# Patient Record
Sex: MALE | Race: AMERICAN INDIAN OR ALASKA NATIVE
[De-identification: names, ages, dates, MRNs, and addresses within clinical notes are randomized per-mention and may not be internally consistent; named-entity substitution may affect disease eponyms.]

---

## 2017-06-08 ENCOUNTER — HOSPITAL ENCOUNTER (OUTPATIENT)
Dept: HOSPITAL 5 - CT | Age: 37
Discharge: HOME | End: 2017-06-08
Attending: RADIOLOGY
Payer: MEDICARE

## 2017-06-08 DIAGNOSIS — I70.221: Primary | ICD-10-CM

## 2017-06-08 LAB — BUN SERPL-MCNC: 16 MG/DL (ref 9–20)

## 2017-06-08 PROCEDURE — 84520 ASSAY OF UREA NITROGEN: CPT

## 2017-06-08 PROCEDURE — 82565 ASSAY OF CREATININE: CPT

## 2017-06-08 PROCEDURE — 36415 COLL VENOUS BLD VENIPUNCTURE: CPT

## 2017-06-27 ENCOUNTER — HOSPITAL ENCOUNTER (INPATIENT)
Dept: HOSPITAL 5 - OPU | Age: 37
LOS: 2 days | Discharge: HOME | DRG: 271 | End: 2017-06-29
Attending: RADIOLOGY | Admitting: RADIOLOGY
Payer: MEDICARE

## 2017-06-27 DIAGNOSIS — Z88.8: ICD-10-CM

## 2017-06-27 DIAGNOSIS — I42.9: ICD-10-CM

## 2017-06-27 DIAGNOSIS — I70.229: Primary | ICD-10-CM

## 2017-06-27 DIAGNOSIS — G47.33: ICD-10-CM

## 2017-06-27 DIAGNOSIS — I25.10: ICD-10-CM

## 2017-06-27 DIAGNOSIS — F17.200: ICD-10-CM

## 2017-06-27 DIAGNOSIS — I50.9: ICD-10-CM

## 2017-06-27 DIAGNOSIS — I11.0: ICD-10-CM

## 2017-06-27 DIAGNOSIS — Z83.3: ICD-10-CM

## 2017-06-27 DIAGNOSIS — I99.8: ICD-10-CM

## 2017-06-27 DIAGNOSIS — I74.3: ICD-10-CM

## 2017-06-27 DIAGNOSIS — Z91.041: ICD-10-CM

## 2017-06-27 DIAGNOSIS — I48.91: ICD-10-CM

## 2017-06-27 DIAGNOSIS — Z91.19: ICD-10-CM

## 2017-06-27 DIAGNOSIS — E87.5: ICD-10-CM

## 2017-06-27 DIAGNOSIS — Z95.5: ICD-10-CM

## 2017-06-27 DIAGNOSIS — E66.9: ICD-10-CM

## 2017-06-27 LAB
ANION GAP SERPL CALC-SCNC: 17 MMOL/L
APTT BLD: 171.4 SEC. (ref 24.2–36.6)
APTT BLD: 26.7 SEC. (ref 24.2–36.6)
BASOPHILS NFR BLD AUTO: 0.4 % (ref 0–1.8)
BUN SERPL-MCNC: 21 MG/DL (ref 9–20)
BUN SERPL-MCNC: 23 MG/DL (ref 9–20)
BUN/CREAT SERPL: 21 %
CALCIUM SERPL-MCNC: 9.3 MG/DL (ref 8.4–10.2)
CHLORIDE SERPL-SCNC: 100.6 MMOL/L (ref 98–107)
CO2 SERPL-SCNC: 22 MMOL/L (ref 22–30)
EOSINOPHIL NFR BLD AUTO: 0 % (ref 0–4.3)
FIBRINOGEN PPP-MCNC: 373 MG/DL (ref 211–480)
GLUCOSE SERPL-MCNC: 259 MG/DL (ref 75–100)
HCT VFR BLD CALC: 51.9 % (ref 35.5–45.6)
HGB BLD-MCNC: 16.8 GM/DL (ref 11.8–15.2)
INR PPP: 1.01 (ref 0.87–1.13)
INR PPP: 1.18 (ref 0.87–1.13)
MCH RBC QN AUTO: 30 PG (ref 28–32)
MCHC RBC AUTO-ENTMCNC: 32 % (ref 32–34)
MCV RBC AUTO: 94 FL (ref 84–94)
PLATELET # BLD: 199 K/MM3 (ref 140–440)
POTASSIUM SERPL-SCNC: 5.2 MMOL/L (ref 3.6–5)
RBC # BLD AUTO: 5.55 M/MM3 (ref 3.65–5.03)
SODIUM SERPL-SCNC: 134 MMOL/L (ref 137–145)
WBC # BLD AUTO: 20.1 K/MM3 (ref 4.5–11)

## 2017-06-27 PROCEDURE — C1894 INTRO/SHEATH, NON-LASER: HCPCS

## 2017-06-27 PROCEDURE — 83735 ASSAY OF MAGNESIUM: CPT

## 2017-06-27 PROCEDURE — 36600 WITHDRAWAL OF ARTERIAL BLOOD: CPT

## 2017-06-27 PROCEDURE — 84520 ASSAY OF UREA NITROGEN: CPT

## 2017-06-27 PROCEDURE — C1887 CATHETER, GUIDING: HCPCS

## 2017-06-27 PROCEDURE — 82803 BLOOD GASES ANY COMBINATION: CPT

## 2017-06-27 PROCEDURE — 76937 US GUIDE VASCULAR ACCESS: CPT

## 2017-06-27 PROCEDURE — 85730 THROMBOPLASTIN TIME PARTIAL: CPT

## 2017-06-27 PROCEDURE — 96367 TX/PROPH/DG ADDL SEQ IV INF: CPT

## 2017-06-27 PROCEDURE — 85025 COMPLETE CBC W/AUTO DIFF WBC: CPT

## 2017-06-27 PROCEDURE — 37228: CPT

## 2017-06-27 PROCEDURE — 80048 BASIC METABOLIC PNL TOTAL CA: CPT

## 2017-06-27 PROCEDURE — 93010 ELECTROCARDIOGRAM REPORT: CPT

## 2017-06-27 PROCEDURE — 96365 THER/PROPH/DIAG IV INF INIT: CPT

## 2017-06-27 PROCEDURE — 96374 THER/PROPH/DIAG INJ IV PUSH: CPT

## 2017-06-27 PROCEDURE — 96366 THER/PROPH/DIAG IV INF ADDON: CPT

## 2017-06-27 PROCEDURE — 82565 ASSAY OF CREATININE: CPT

## 2017-06-27 PROCEDURE — 37214 CESSJ THERAPY CATH REMOVAL: CPT

## 2017-06-27 PROCEDURE — C1725 CATH, TRANSLUMIN NON-LASER: HCPCS

## 2017-06-27 PROCEDURE — 37184 PRIM ART M-THRMBC 1ST VSL: CPT

## 2017-06-27 PROCEDURE — C1769 GUIDE WIRE: HCPCS

## 2017-06-27 PROCEDURE — 36415 COLL VENOUS BLD VENIPUNCTURE: CPT

## 2017-06-27 PROCEDURE — C1757 CATH, THROMBECTOMY/EMBOLECT: HCPCS

## 2017-06-27 PROCEDURE — 93005 ELECTROCARDIOGRAM TRACING: CPT

## 2017-06-27 PROCEDURE — 85384 FIBRINOGEN ACTIVITY: CPT

## 2017-06-27 PROCEDURE — 96375 TX/PRO/DX INJ NEW DRUG ADDON: CPT

## 2017-06-27 PROCEDURE — 75710 ARTERY X-RAYS ARM/LEG: CPT

## 2017-06-27 PROCEDURE — 37211 THROMBOLYTIC ART THERAPY: CPT

## 2017-06-27 PROCEDURE — 85610 PROTHROMBIN TIME: CPT

## 2017-06-27 RX ADMIN — ALTEPLASE ONE MG: 2.2 INJECTION, POWDER, LYOPHILIZED, FOR SOLUTION INTRAVENOUS at 15:41

## 2017-06-27 RX ADMIN — ALTEPLASE ONE MG: 2.2 INJECTION, POWDER, LYOPHILIZED, FOR SOLUTION INTRAVENOUS at 15:02

## 2017-06-27 RX ADMIN — OXYCODONE AND ACETAMINOPHEN PRN TAB: 5; 325 TABLET ORAL at 21:01

## 2017-06-27 RX ADMIN — DIGOXIN SCH MG: 125 TABLET ORAL at 20:28

## 2017-06-27 RX ADMIN — HEPARIN SODIUM SCH MLS/HR: 5000 INJECTION, SOLUTION INTRAVENOUS at 17:30

## 2017-06-27 RX ADMIN — SODIUM CHLORIDE SCH MLS/HR: 0.9 INJECTION, SOLUTION INTRAVENOUS at 17:30

## 2017-06-27 RX ADMIN — HEPARIN SODIUM SCH MLS/HR: 5000 INJECTION, SOLUTION INTRAVENOUS at 21:30

## 2017-06-27 RX ADMIN — ALTEPLASE SCH MLS/HR: 2.2 INJECTION, POWDER, LYOPHILIZED, FOR SOLUTION INTRAVENOUS at 17:30

## 2017-06-27 NOTE — ANESTHESIA CONSULTATION
Anesthesia Consult and Med Hx


Date of service: 06/27/17





- Airway


Anesthetic Teeth Evaluation: Good


ROM Head & Neck: Adequate


Mental/Hyoid Distance: Adequate


Mallampati Class: Class II


Intubation Access Assessment: Probably Good





- Pulmonary Exam


CTA: Yes





- Cardiac Exam


Cardiac Exam: RRR





- Pre-Operative Health Status


ASA Pre-Surgery Classification: ASA3


Proposed Anesthetic Plan: General





- Pulmonary


Hx Smoking: Yes (1 PPD)


Hx Asthma: Yes


COPD: Yes


Hx Pneumonia: No


Hx Sleep Apnea: Yes (+CPAP)





- Cardiovascular System


Hx Hypertension: Yes


Hx Heart Attack/AMI: No (Echo in 12/2013 revealed EF-20-25%)


Hx Cardia Arrhythmia: Yes (AFIB)


Hx Heart Murmur: Yes





- Central Nervous System


Hx Seizures: No


CVA: No


Hx Psychiatric Problems: No





- Endocrine


Hx Renal Disease: No (STONES)


Hx End Stage Renal Disease: No


Hx Non-Insulin Dependent Diabetes: No (PRE-DIABETES)


Hx Thyroid Disease: No





- Other Systems


Hx Alcohol Use: Yes


Hx Substance Use: No


Hx Cancer: No


Hx Obesity: Yes (MORBID)

## 2017-06-28 LAB
ANION GAP SERPL CALC-SCNC: 18 MMOL/L
APTT BLD: 26.1 SEC. (ref 24.2–36.6)
APTT BLD: 27.6 SEC. (ref 24.2–36.6)
BASOPHILS NFR BLD AUTO: 0 % (ref 0–1.8)
BASOPHILS NFR BLD AUTO: 0.5 % (ref 0–1.8)
BUN SERPL-MCNC: 18 MG/DL (ref 9–20)
BUN/CREAT SERPL: 18 %
CALCIUM SERPL-MCNC: 9.5 MG/DL (ref 8.4–10.2)
CHLORIDE SERPL-SCNC: 99.3 MMOL/L (ref 98–107)
CO2 SERPL-SCNC: 25 MMOL/L (ref 22–30)
EOSINOPHIL NFR BLD AUTO: 0.1 % (ref 0–4.3)
EOSINOPHIL NFR BLD AUTO: 0.1 % (ref 0–4.3)
FIBRINOGEN PPP-MCNC: 306 MG/DL (ref 211–480)
FIBRINOGEN PPP-MCNC: 328 MG/DL (ref 211–480)
GLUCOSE SERPL-MCNC: 191 MG/DL (ref 75–100)
HCT VFR BLD CALC: 51.7 % (ref 35.5–45.6)
HCT VFR BLD CALC: 54.2 % (ref 35.5–45.6)
HGB BLD-MCNC: 16.6 GM/DL (ref 11.8–15.2)
HGB BLD-MCNC: 17.3 GM/DL (ref 11.8–15.2)
INR PPP: 1.02 (ref 0.87–1.13)
INR PPP: 1.03 (ref 0.87–1.13)
MCH RBC QN AUTO: 30 PG (ref 28–32)
MCH RBC QN AUTO: 30 PG (ref 28–32)
MCHC RBC AUTO-ENTMCNC: 32 % (ref 32–34)
MCHC RBC AUTO-ENTMCNC: 32 % (ref 32–34)
MCV RBC AUTO: 93 FL (ref 84–94)
MCV RBC AUTO: 94 FL (ref 84–94)
PLATELET # BLD: 192 K/MM3 (ref 140–440)
PLATELET # BLD: 194 K/MM3 (ref 140–440)
POTASSIUM SERPL-SCNC: 5.1 MMOL/L (ref 3.6–5)
RBC # BLD AUTO: 5.53 M/MM3 (ref 3.65–5.03)
RBC # BLD AUTO: 5.79 M/MM3 (ref 3.65–5.03)
SODIUM SERPL-SCNC: 137 MMOL/L (ref 137–145)
WBC # BLD AUTO: 17.2 K/MM3 (ref 4.5–11)
WBC # BLD AUTO: 17.9 K/MM3 (ref 4.5–11)

## 2017-06-28 RX ADMIN — MORPHINE SULFATE PRN MG: 4 INJECTION, SOLUTION INTRAMUSCULAR; INTRAVENOUS at 00:00

## 2017-06-28 RX ADMIN — DIGOXIN SCH MG: 125 TABLET ORAL at 15:00

## 2017-06-28 RX ADMIN — MORPHINE SULFATE PRN MG: 4 INJECTION, SOLUTION INTRAMUSCULAR; INTRAVENOUS at 12:44

## 2017-06-28 RX ADMIN — MORPHINE SULFATE PRN MG: 4 INJECTION, SOLUTION INTRAMUSCULAR; INTRAVENOUS at 20:19

## 2017-06-28 RX ADMIN — OXYCODONE AND ACETAMINOPHEN PRN TAB: 5; 325 TABLET ORAL at 17:20

## 2017-06-28 RX ADMIN — LOSARTAN POTASSIUM SCH MG: 50 TABLET, FILM COATED ORAL at 09:28

## 2017-06-28 RX ADMIN — OXYCODONE AND ACETAMINOPHEN PRN TAB: 5; 325 TABLET ORAL at 09:25

## 2017-06-28 RX ADMIN — FUROSEMIDE SCH: 40 TABLET ORAL at 12:00

## 2017-06-28 RX ADMIN — OXYCODONE AND ACETAMINOPHEN PRN TAB: 5; 325 TABLET ORAL at 04:12

## 2017-06-28 RX ADMIN — PREDNISONE SCH MG: 10 TABLET ORAL at 09:30

## 2017-06-28 RX ADMIN — SODIUM CHLORIDE SCH MLS/HR: 0.9 INJECTION, SOLUTION INTRAVENOUS at 00:12

## 2017-06-28 RX ADMIN — OXYCODONE AND ACETAMINOPHEN PRN TAB: 5; 325 TABLET ORAL at 23:40

## 2017-06-28 RX ADMIN — DILTIAZEM HYDROCHLORIDE SCH MLS/HR: 100 INJECTION, POWDER, LYOPHILIZED, FOR SOLUTION INTRAVENOUS at 19:33

## 2017-06-28 RX ADMIN — LOSARTAN POTASSIUM SCH MG: 50 TABLET, FILM COATED ORAL at 12:01

## 2017-06-28 RX ADMIN — ALTEPLASE SCH MLS/HR: 2.2 INJECTION, POWDER, LYOPHILIZED, FOR SOLUTION INTRAVENOUS at 00:05

## 2017-06-28 NOTE — VASCULAR LAB REPORT
MISCELLANEOUS VESSEL IDENTIFICATION:





COMMENTS ON THE SCAN:



The left common femoral artery was identified and under real-time

ultrasound guidance was cannulated.



IMPRESSION:



Successful ultrasound guided arterial cannulation.

## 2017-06-28 NOTE — HISTORY AND PHYSICAL REPORT
History of Present Illness


Date of examination: 06/27/17


Date of admission: 


06/27/17 16:45





Chief complaint: 


Right foot ischemic rest pain


History of present illness: 





37 year old man with a history of A-fib and CHF. Has been having right foot 

pain with numbness and tingling for approximately 5-6 weeks. Worsens when 

walking. Was not on anticoagulation for his A-fib.





Past History


Past Medical History: atrial fib, CAD, heart failure, hypertension, other (

renal stones)


Past Surgical History: Other (coronary stent)


Social history: smoking





Medications and Allergies


 Allergies











Allergy/AdvReac Type Severity Reaction Status Date / Time


 


ibuprofen [From Motrin] Allergy  Swelling Verified 11/02/16 13:24


 


lisinopril Allergy  COUGH Verified 11/02/16 13:24


 


metoprolol Allergy  Nausea Verified 11/02/16 13:24


 


Iodinated Contrast Media - AdvReac  KIDNEYS Verified 11/02/16 13:24





IV Dye   SHUT DOWN  











 Home Medications











 Medication  Instructions  Recorded  Confirmed  Last Taken  Type


 


RX: Digoxin [Lanoxin] 0.25 mg PO DAILY@1700 #30 tablet 01/05/15 06/27/17 06/26/

17 Rx


 


Aspirin [Aspirin TAB] 325 mg PO QDAY 06/27/17 06/27/17 06/26/17 History


 


Furosemide [Lasix TAB] 40 mg PO QDAY 06/27/17 06/27/17 06/26/17 History


 


Losartan [Cozaar] 50 mg PO QDAY 06/27/17 06/27/17 06/26/17 History











Active Meds: 


Active Medications





Al Hydrox/Mg Hydrox/Simethicone (Alum-Mag Hydrox-Simeth [phone]/5ml)  30 

ml PO Q4H PRN


   PRN Reason: Indigestion


Bisacodyl (Dulcolax)  10 mg LA QDAY PRN


   PRN Reason: constipation unrelieved by MOM


Digoxin (Lanoxin)  0.125 mg PO DAILY@1700 Count includes the [facility]


   Last Admin: 06/27/17 20:28 Dose:  0.125 mg


Furosemide (Lasix)  40 mg PO DAILY@0600 Count includes the [facility]


Alteplase, Recombinant 20 mg/ (Sodium Chloride)  500 mls @ 25 mls/hr EKOSDLUMEN 

AS DIRECT Count includes the [facility]


   Last Admin: 06/28/17 00:05 Dose:  25 mls/hr


Heparin Sodium/Sodium Chloride (Heparin/ 0.45% Nacl-25,000 Unit/500 Ml)  25,000 

unit in 500 mls @ 10 mls/hr SHEATH AS DIRECT LYNDA; 500 UNITS/HR


   PRN Reason: Protocol


   Last Admin: 06/27/17 21:30 Dose:  500 units/hr, 10 mls/hr


Sodium Chloride (Nacl 0.9% 1000 Ml)  1,000 mls @ 30 mls/hr IV AS DIRECT LYNDA


   Last Admin: 06/28/17 00:12 Dose:  30 mls/hr


Sodium Chloride (Nacl 0.9% 1000 Ml)  1,000 mls @ 30 mls/hr SHEATH AS DIRECT LYNDA


Sodium Chloride (Nacl 0.9% 1000 Ml)  1,000 mls @ 35 mls/hr EKOSCLUMEN AS DIRECT 

LYNDA


Losartan Potassium (Cozaar)  50 mg PO QDAY LYNDA


Magnesium Hydroxide (Milk Of Magnesia)  30 ml PO Q4H PRN


   PRN Reason: Constipation


Morphine Sulfate (Morphine)  2 mg IV Q4H PRN


   PRN Reason: Pain, Moderate (4-6)


   Last Admin: 06/27/17 19:00 Dose:  2 mg


Morphine Sulfate (Morphine)  4 mg IV Q4H PRN


   PRN Reason: Pain , Severe (7-10)


   Last Admin: 06/28/17 00:00 Dose:  4 mg


Ondansetron HCl (Zofran)  4 mg IV Q8H PRN


   PRN Reason: N/V unrelieved by Reglan


   Last Admin: 06/27/17 19:20 Dose:  4 mg


Oxycodone/Acetaminophen (Percocet 5/325)  2 tab PO Q4H PRN


   PRN Reason: Pain, Moderate (4-6)


   Last Admin: 06/28/17 04:12 Dose:  2 tab











Review of Systems


Cardiovascular: rapid/irregular heart beat, dyspnea on exertion, high blood 

pressure


Respiratory: sleep apnea


Genitourinary Male: kidney stones


Endocrine: high blood sugars





Exam





- Constitutional


Vitals: 


 











Temp Pulse Resp BP Pulse Ox


 


 97.5 F L  81   14   131/78   98 


 


 06/28/17 04:00  06/28/17 06:01  06/28/17 06:01  06/28/17 06:01  06/28/17 06:01











General appearance: Present: mild distress





- EENT


Eyes: Present: PERRL


ENT: hearing intact, clear oral mucosa





- Neck


Neck: Present: supple, normal ROM





- Respiratory


Respiratory effort: normal





- Cardiovascular


Rhythm: irregularly irregular


Heart Sounds: Present: S1 & S2





- Extremities


Extremities: abnormal (No palpable pulses in the right foot. Dopplerable with 

monophasic flow. Right foot cooler than the left. No ulcerations.)


Extremity abnormal: pulses diminished, tenderness





- Abdominal


General gastrointestinal: Present: soft, non-tender, normal bowel sounds


Male genitourinary: Present: deferred





- Rectal


Rectal Exam: deferred





- Integumentary


Integumentary: Present: clear





- Musculoskeletal


Musculoskeletal: strength equal bilaterally





- Psychiatric


Psychiatric: appropriate mood/affect





- Neurologic


Neurologic: moves all extremities, gait normal





Results





- Labs


CBC & Chem 7: 


 06/28/17 12:29





 06/28/17 12:37


Labs: 


 Abnormal lab results











  06/27/17 06/27/17 06/27/17 Range/Units





  12:55 16:30 21:56 


 


WBC    20.1 H  (4.5-11.0)  K/mm3


 


RBC    5.55 H  (3.65-5.03)  M/mm3


 


Hgb    16.8 H  (11.8-15.2)  gm/dl


 


Hct    51.9 H  (35.5-45.6)  %


 


RDW    15.9 H  (13.2-15.2)  %


 


Lymph % (Auto)    6.0 L  (13.4-35.0)  %


 


Mono % (Auto)     (0.0-7.3)  %


 


Mono #    1.5 H  (0.0-0.8)  K/mm3


 


Seg Neutrophils %    86.3 H  (40.0-70.0)  %


 


Seg Neutrophils #    17.3 H  (1.8-7.7)  K/mm3


 


PT   15.6 H   (12.2-14.9)  Sec.


 


INR   1.18 H   (0.87-1.13)  


 


APTT   171.4 H*   (24.2-36.6)  Sec.


 


Sodium     (137-145)  mmol/L


 


Potassium     (3.6-5.0)  mmol/L


 


BUN  23 H    (9-20)  mg/dL


 


Glucose     ()  mg/dL














  06/27/17 06/28/17 Range/Units





  22:00 04:21 


 


WBC   17.9 H  (4.5-11.0)  K/mm3


 


RBC   5.53 H  (3.65-5.03)  M/mm3


 


Hgb   16.6 H  (11.8-15.2)  gm/dl


 


Hct   51.7 H  (35.5-45.6)  %


 


RDW   16.0 H  (13.2-15.2)  %


 


Lymph % (Auto)   7.6 L  (13.4-35.0)  %


 


Mono % (Auto)   9.6 H  (0.0-7.3)  %


 


Mono #   1.7 H  (0.0-0.8)  K/mm3


 


Seg Neutrophils %   82.7 H  (40.0-70.0)  %


 


Seg Neutrophils #   14.8 H  (1.8-7.7)  K/mm3


 


PT    (12.2-14.9)  Sec.


 


INR    (0.87-1.13)  


 


APTT    (24.2-36.6)  Sec.


 


Sodium  134 L   (137-145)  mmol/L


 


Potassium  5.2 H   (3.6-5.0)  mmol/L


 


BUN  21 H   (9-20)  mg/dL


 


Glucose  259 H   ()  mg/dL














Assessment and Plan





1. Right lower extremity angiogram performed 


2. Evidence for distal embolus involving the right anterior tibial, peroneal 

and posterior tibial arteries.


3. Attempted thrombectomy performed.


4. Infustion of tPA into the anterior tibial artery overnight with evaluation 

on 6/28/17

## 2017-06-28 NOTE — CONSULTATION
History of Present Illness


Consult date: 06/28/17


Requesting physician: SERGIO CORRIGAN


History of present illness: 





PULMONARY/CCM CONSULT NOTE (Full dictation # [id number])


Please see dictated notes for full details





Past History


Past Medical History: atrial fib, CAD, heart failure, hypertension, other (

renal stones)


Past Surgical History: Other (coronary stent)


Social history: smoking





Medications and Allergies


 Allergies











Allergy/AdvReac Type Severity Reaction Status Date / Time


 


ibuprofen [From Motrin] Allergy  Swelling Verified 11/02/16 13:24


 


lisinopril Allergy  COUGH Verified 11/02/16 13:24


 


metoprolol Allergy  Nausea Verified 11/02/16 13:24


 


Iodinated Contrast Media - AdvReac  KIDNEYS Verified 11/02/16 13:24





IV Dye   SHUT DOWN  











 Home Medications











 Medication  Instructions  Recorded  Confirmed  Last Taken  Type


 


Digoxin [Lanoxin] 0.25 mg PO DAILY@1700 #30 tablet 01/05/15 06/27/17 06/26/17 Rx


 


Aspirin [Aspirin TAB] 325 mg PO QDAY 06/27/17 06/27/17 06/26/17 History


 


Furosemide [Lasix TAB] 40 mg PO QDAY 06/27/17 06/27/17 06/26/17 History


 


Losartan [Cozaar] 50 mg PO QDAY 06/27/17 06/27/17 06/26/17 History











Active Meds: 


Active Medications





Al Hydrox/Mg Hydrox/Simethicone (Alum-Mag Hydrox-Simeth [phone]/5ml)  30 

ml PO Q4H PRN


   PRN Reason: Indigestion


Bisacodyl (Dulcolax)  10 mg CT QDAY PRN


   PRN Reason: constipation unrelieved by MOM


Digoxin (Lanoxin)  0.125 mg PO DAILY@1700 [facility]


   Last Admin: 06/27/17 20:28 Dose:  0.125 mg


Diphenhydramine HCl (Benadryl)  50 mg PO PREOP NR


   Stop: 06/28/17 23:59


Diphenhydramine HCl (Benadryl)  50 mg PO ONCE NR


   Stop: 06/28/17 23:59


   Last Admin: 06/28/17 09:30 Dose:  50 mg


Furosemide (Lasix)  40 mg PO DAILY@0600 [facility]


   Last Admin: 06/28/17 12:00 Dose:  Not Given


Alteplase, Recombinant 20 mg/ (Sodium Chloride)  500 mls @ 25 mls/hr EKOSDLUMEN 

AS DIRECT LYNDA


   Last Admin: 06/28/17 00:05 Dose:  25 mls/hr


Heparin Sodium/Sodium Chloride (Heparin/ 0.45% Nacl-25,000 Unit/500 Ml)  25,000 

unit in 500 mls @ 10 mls/hr SHEATH AS DIRECT LYNDA; 500 UNITS/HR


   PRN Reason: Protocol


   Last Admin: 06/27/17 21:30 Dose:  500 units/hr, 10 mls/hr


Sodium Chloride (Nacl 0.9% 1000 Ml)  1,000 mls @ 30 mls/hr IV AS DIRECT LYNDA


   Last Admin: 06/28/17 00:12 Dose:  30 mls/hr


Sodium Chloride (Nacl 0.9% 1000 Ml)  1,000 mls @ 30 mls/hr SHEATH AS DIRECT LYNDA


Sodium Chloride (Nacl 0.9% 1000 Ml)  1,000 mls @ 35 mls/hr EKOSCLUMEN AS DIRECT 

LYNDA


Losartan Potassium (Cozaar)  50 mg PO QDAY [facility]


   Last Admin: 06/28/17 12:01 Dose:  50 mg


Magnesium Hydroxide (Milk Of Magnesia)  30 ml PO Q4H PRN


   PRN Reason: Constipation


Morphine Sulfate (Morphine)  2 mg IV Q4H PRN


   PRN Reason: Pain, Moderate (4-6)


   Last Admin: 06/27/17 19:00 Dose:  2 mg


Morphine Sulfate (Morphine)  4 mg IV Q4H PRN


   PRN Reason: Pain , Severe (7-10)


   Last Admin: 06/28/17 00:00 Dose:  4 mg


Ondansetron HCl (Zofran)  4 mg IV Q8H PRN


   PRN Reason: N/V unrelieved by Regamara


   Last Admin: 06/27/17 19:20 Dose:  4 mg


Oxycodone/Acetaminophen (Percocet 5/325)  2 tab PO Q4H PRN


   PRN Reason: Pain, Moderate (4-6)


   Last Admin: 06/28/17 09:25 Dose:  2 tab


Prednisone (Deltasone)  50 mg PO QDAY LYNDA


   Last Admin: 06/28/17 09:30 Dose:  50 mg











Physical Examination


Vital signs: 


 Vital Signs











Temp Pulse Resp BP Pulse Ox


 


 98.2 F   81   20   141/105   95 


 


 06/27/17 12:53  06/27/17 12:53  06/27/17 12:53  06/27/17 12:53  06/27/17 12:53














Results





- Laboratory Findings


CBC and BMP: 


 06/28/17 12:29





 06/28/17 12:37


PT/INR, D-dimer











PT  14.0 Sec. (12.2-14.9)   06/28/17  04:21    


 


INR  1.03  (0.87-1.13)   06/28/17  04:21    








Abnormal lab findings: 


 Abnormal Labs











  06/27/17 06/27/17 06/27/17





  12:55 16:30 21:56


 


WBC    20.1 H


 


RBC    5.55 H


 


Hgb    16.8 H


 


Hct    51.9 H


 


RDW    15.9 H


 


Lymph % (Auto)    6.0 L


 


Mono % (Auto)   


 


Mono #    1.5 H


 


Seg Neutrophils %    86.3 H


 


Seg Neutrophils #    17.3 H


 


PT   15.6 H 


 


INR   1.18 H 


 


APTT   171.4 H* 


 


Sodium   


 


Potassium   


 


BUN  23 H  


 


Glucose   














  06/27/17 06/28/17





  22:00 04:21


 


WBC   17.9 H


 


RBC   5.53 H


 


Hgb   16.6 H


 


Hct   51.7 H


 


RDW   16.0 H


 


Lymph % (Auto)   7.6 L


 


Mono % (Auto)   9.6 H


 


Green #   1.7 H


 


Seg Neutrophils %   82.7 H


 


Seg Neutrophils #   14.8 H


 


PT  


 


INR  


 


APTT  


 


Sodium  134 L 


 


Potassium  5.2 H 


 


BUN  21 H 


 


Glucose  259 H

## 2017-06-28 NOTE — ADMIT CRITERIA FORM
Admission Criteria Documentation: 





                               VASCULAR DISEASE GRG





Clinical Indications for Admission to Inpatient Care





                                                                         (Place 

'X' for any and all applicable criteria):


 


Hospital admission is needed for appropriate care of the patient because of ANY 

ONE of the following (1)(2)(3)(4):


[ ]I.    Life-threatening or limb-threatening skin ulcer as indicated by ANY ONE

 of the following(5):


         [ ]a)   Surrounding cellulitis unresponsive to outpatient treatment    


         [ ]b)   Wet gangrene


         [ ]c)   Lymphangitis                                                  

                        


         [ ]d)   Bacteremia


[ ]II.    Gangrene requiring intensity and frequency of care not manageable to 

outpatient, emergency, or observation level of care(5)


[ ]III.   Severe pain requiring acute inpatient management 


[X ]IV.  Interventional revascularization (eg, surgery, thrombolysis) needed (eg

, critical limb ischemia)(21)


[ ]V.   Urgent inpatient IV anticoagulation needed due to ALL of the following:


         [ ]a)   Temporary subtherapeutic anticoagulation unacceptable because 

of high risk of short-term venous or arterial 


                  thromboembolism due to ANY ONE of the  following(7)(8)(9): 


                   [ ]i)       Venous thromboembolism within the past 12 months 


                   [ ]ii)    Underlying malignancy 


                   [ ]iii)   Patient with mechanical cardiac valve(10)(11) 


                   [ ]iv)   Underlying hypercoagulable state (eg, protein C or 

protein S deficiency, antithrombin deficiency, antiphospholipid antibodies) 


                   [ ]v)    Patient at high risk of thromboembolism (eg, status 

post orthopedic surgery, history of recurrent venous thromboembolism) 


                   [ ]vi)   Atrial fibrillation with rheumatic valvular heart 

disease 


                   [ ]vii)   Atrial fibrillation with 3 or MORE of the 

following : 


                             [ ]1)   Congestive heart failure 


                             [ ]2)   Hypertension 


                             [ ]3)   Age 65 years or older  


                             [ ]4)   Diabetes mellitus 


                             [ ]5)   History of thromboembolism (eg, stroke, TIA

, or systemic embolization) more than 3 months ago


                             [ ]6)   Female gender


         [ ]b)     Contraindications to outpatient use of "bridging" agent or 

alternative oral anticoagulant as indicated by ALL of the following: 


                    [ ]i)    Contraindication to outpatient use of low-molecular

-weight heparin as "bridging" agent as indicated by ANY ONE  of the following(8)

: 


                             [ ]1)   Documented current or history of heparin-

induced thrombocytopenia(12) 


                             [ ]2)   Severe thrombocytopenia (eg, platelet 

count less than 50,000/mm3 (50 x109/L)) 


                             [ ]3)   Documented allergy to heparin, low-

molecular-weight heparin, or pork products 


                             [ ]4)   Renal failure (creatinine clearance < 30 mL

/min/1.73m2 (0.50 mL/sec/1.73m2) or on dialysis)  


                             [ ]5)   Inability to manage self-injection (eg, by 

patient, caregiver, or visiting nurse)


                   [ ]ii)    Contraindication to outpatient use of fondaparinux 

as "bridging" agent as indicated by ANY ONE  of the following(13)(14)(15)(16): 


                             [ ]1)   Severe thrombocytopenia (eg, platelet 

count less than 50,000/mm3 (50 x109/L)) 


                             [ ]2)   Hypersensitivity to fondaparinux, related 

drugs, or product components 


                             [ ]3)   Renal failure (creatinine clearance less 

than 30 mL/min/1.73m2 (0.50 mL/sec/1.73m2) or on dialysis)  


                             [ ]4)   Inability to manage self-injection (eg, by 

patient, caregiver, or visiting nurse)


                   [ ]iii)   Oral direct thrombin inhibitor (eg, dabigatran) or 

oral coagulation factor Xa inhibitor (eg, rivaroxaban) not appropriate as oral  


                            anticoagulation (eg, indication not appropriate) or 

contraindicated (eg, hypersensitivity, renal failure)(13)(16)(17)(18)(19)(20) 


[ ]VI.  Suspected severe acute ischemia due to peripheral vascular disease as 

indicated by ANY ONE of the following(5)(6):


         [ ]a)    Tissue necrosis


         [ ]b)     Severe pain


         [ ]c)     Acute pulselessness


         [ ]d)     Other evidence of acute severe ischemia (eg, lactic acidosis

, motor dysfunction)


[ ]VII. Acute or newly diagnosed major vessel (eg, aorta) dissection, rupture, 

or leakage(5)(6)(22)(23) 


[ ]VIII.Vascular Disease and ALL of the following:


         [ ]a)     Symptom or finding for which emergency and observation care 

have failed or are not considered appropriate


                    (Use General Criteria: Observation Care as   appropriate)


         [ ]b)      Presence of ANY ONE of the following:


                    [ ]i)    A General Admission Criteria                   


                    [ ]ii)   A Pediatric General Admission Criteria











The original [facility] content created by Jane Hale has been revised.


 The portions of the content which have been revised are identified through the 

use of italic text or in bold, and [facility] 


has neither  reviewed nor approved the modified material. All other unmodified 

content is copyright  [facility].





Please see references footnoted in the original [facility] edition 

2016


Admission Criteria Met: Yes

## 2017-06-28 NOTE — PROGRESS NOTE
Assessment and Plan





1. Right lower extremity angiogram performed 


2. Evidence for distal embolus involving the right anterior tibial, peroneal 

and posterior tibial arteries.


3. Attempted thrombectomy performed.


4. Infustion of tPA into the anterior tibial artery overnight with evaluation 

on 6/28/17


5. Repeat angio today with possible further intervention if needed.





Subjective


Date of service: 06/28/17


Principal diagnosis: Right foot ischemic rest pain


Interval history: 





Patient s/p day one of EKOS catheter infusion right lower extremity.


He states he has been having more throbbing pain in the right foot. His 

numbness has resolved however.


No other complaints.





Objective





- Exam


Narrative Exam: 





Left going sheath and EKOS catheter entrance site with blood tinged bandage.


No hematoma.


Right  to palpation. Remains mildly cool. Normal sensation.





- Constitutional


Vitals: 


 Vital Signs - 12hr











  06/28/17 06/28/17 06/28/17





  02:00 02:31 03:01


 


Temperature   


 


Pulse Rate 124 H 105 H 96 H


 


Pulse Rate [   





Left From   





Monitor]   


 


Pulse Rate [   





Left Radial]   


 


Respiratory 15 15 15





Rate   


 


Blood Pressure 124/87 124/87 126/91


 


O2 Sat by Pulse 96 96 92





Oximetry   














  06/28/17 06/28/17 06/28/17





  03:31 04:00 04:01


 


Temperature  97.5 F L 


 


Pulse Rate 133 H  96 H


 


Pulse Rate [  96 H 





Left From   





Monitor]   


 


Pulse Rate [  96 H 





Left Radial]   


 


Respiratory 22  15





Rate   


 


Blood Pressure 126/91  131/78


 


O2 Sat by Pulse 96 97 97





Oximetry   














  06/28/17 06/28/17 06/28/17





  04:31 05:01 05:31


 


Temperature   


 


Pulse Rate 117 H 98 H 92 H


 


Pulse Rate [   





Left From   





Monitor]   


 


Pulse Rate [   





Left Radial]   


 


Respiratory 17 13 15





Rate   


 


Blood Pressure 131/78 131/78 131/78


 


O2 Sat by Pulse 94 98 98





Oximetry   














  06/28/17 06/28/17 06/28/17





  06:00 06:01 06:31


 


Temperature   


 


Pulse Rate 81 81 101 H


 


Pulse Rate [ 93 H  





Left From   





Monitor]   


 


Pulse Rate [   





Left Radial]   


 


Respiratory 14 14 20





Rate   


 


Blood Pressure 131/78 131/78 108/89


 


O2 Sat by Pulse 98 98 98





Oximetry   














  06/28/17 06/28/17 06/28/17





  07:00 07:23 07:30


 


Temperature   


 


Pulse Rate 114 H 99 H 108 H


 


Pulse Rate [   





Left From   





Monitor]   


 


Pulse Rate [   





Left Radial]   


 


Respiratory 14 13 13





Rate   


 


Blood Pressure 108/89 108/89 141/90


 


O2 Sat by Pulse 98 98 99





Oximetry   














  06/28/17 06/28/17 06/28/17





  08:00 08:23 08:30


 


Temperature 98.6 F 98.6 F 


 


Pulse Rate 99 H 107 H 109 H


 


Pulse Rate [ 103 H  





Left From   





Monitor]   


 


Pulse Rate [ 103 H  





Left Radial]   


 


Respiratory 13 18 13





Rate   


 


Blood Pressure 137/104 141/90 137/104


 


O2 Sat by Pulse 99 98 99





Oximetry   














  06/28/17 06/28/17 06/28/17





  09:00 09:23 09:25


 


Temperature   


 


Pulse Rate 115 H 114 H 


 


Pulse Rate [   





Left From   





Monitor]   


 


Pulse Rate [   





Left Radial]   


 


Respiratory 21 18 24





Rate   


 


Blood Pressure 141/84 137/104 


 


O2 Sat by Pulse 96 94 





Oximetry   














  06/28/17 06/28/17 06/28/17





  09:30 10:00 10:30


 


Temperature   


 


Pulse Rate 131 H 119 H 125 H


 


Pulse Rate [   





Left From   





Monitor]   


 


Pulse Rate [   





Left Radial]   


 


Respiratory 22 14 13





Rate   


 


Blood Pressure 141/84 141/84 133/94


 


O2 Sat by Pulse 98 98 99





Oximetry   














  06/28/17 06/28/17 06/28/17





  11:00 11:30 12:00


 


Temperature   97.9 F


 


Pulse Rate 99 H 103 H 


 


Pulse Rate [   





Left From   





Monitor]   


 


Pulse Rate [   





Left Radial]   


 


Respiratory 16 14 





Rate   


 


Blood Pressure 143/104 143/104 


 


O2 Sat by Pulse 98 99 





Oximetry   














  06/28/17 06/28/17





  12:01 12:44


 


Temperature  


 


Pulse Rate 119 H 


 


Pulse Rate [  





Left From  





Monitor]  


 


Pulse Rate [  





Left Radial]  


 


Respiratory  15





Rate  


 


Blood Pressure 143/104 


 


O2 Sat by Pulse  





Oximetry  











General appearance: Present: no acute distress





- EENT


Eyes: PERRL


ENT: hearing intact





- Respiratory


Respiratory effort: normal





- Cardiovascular


Rhythm: irregularly irregular


Extremity abnormal: pulses diminished (No palpable pulse on the right side. 

Capillary refill less than 5 sec. No ulcerations present.)





- Labs


CBC & Chem 7: 


 06/29/17 00:09





 06/28/17 12:37


Labs: 


 Abnormal lab results











  06/27/17 06/27/17 06/27/17 Range/Units





  16:30 21:56 22:00 


 


WBC   20.1 H   (4.5-11.0)  K/mm3


 


RBC   5.55 H   (3.65-5.03)  M/mm3


 


Hgb   16.8 H   (11.8-15.2)  gm/dl


 


Hct   51.9 H   (35.5-45.6)  %


 


RDW   15.9 H   (13.2-15.2)  %


 


Lymph % (Auto)   6.0 L   (13.4-35.0)  %


 


Mono % (Auto)     (0.0-7.3)  %


 


Lymph #     (1.2-5.4)  K/mm3


 


Mono #   1.5 H   (0.0-0.8)  K/mm3


 


Seg Neutrophils %   86.3 H   (40.0-70.0)  %


 


Seg Neutrophils #   17.3 H   (1.8-7.7)  K/mm3


 


PT  15.6 H    (12.2-14.9)  Sec.


 


INR  1.18 H    (0.87-1.13)  


 


APTT  171.4 H*    (24.2-36.6)  Sec.


 


Sodium    134 L  (137-145)  mmol/L


 


Potassium    5.2 H  (3.6-5.0)  mmol/L


 


BUN    21 H  (9-20)  mg/dL


 


Glucose    259 H  ()  mg/dL














  06/28/17 06/28/17 06/28/17 Range/Units





  04:21 12:29 12:37 


 


WBC  17.9 H  17.2 H   (4.5-11.0)  K/mm3


 


RBC  5.53 H  5.79 H   (3.65-5.03)  M/mm3


 


Hgb  16.6 H  17.3 H   (11.8-15.2)  gm/dl


 


Hct  51.7 H  54.2 H   (35.5-45.6)  %


 


RDW  16.0 H  15.6 H   (13.2-15.2)  %


 


Lymph % (Auto)  7.6 L  6.6 L   (13.4-35.0)  %


 


Mono % (Auto)  9.6 H  7.6 H   (0.0-7.3)  %


 


Lymph #   1.1 L   (1.2-5.4)  K/mm3


 


Mono #  1.7 H  1.3 H   (0.0-0.8)  K/mm3


 


Seg Neutrophils %  82.7 H  85.2 H   (40.0-70.0)  %


 


Seg Neutrophils #  14.8 H  14.7 H   (1.8-7.7)  K/mm3


 


PT     (12.2-14.9)  Sec.


 


INR     (0.87-1.13)  


 


APTT     (24.2-36.6)  Sec.


 


Sodium     (137-145)  mmol/L


 


Potassium    5.1 H  (3.6-5.0)  mmol/L


 


BUN     (9-20)  mg/dL


 


Glucose    191 H  ()  mg/dL

## 2017-06-29 VITALS — DIASTOLIC BLOOD PRESSURE: 95 MMHG | SYSTOLIC BLOOD PRESSURE: 140 MMHG

## 2017-06-29 LAB
APTT BLD: 27.3 SEC. (ref 24.2–36.6)
BASE EXCESS STD BLDA CALC-SCNC: 1 MMOL/L
BASOPHILS NFR BLD AUTO: 0.4 % (ref 0–1.8)
CO2 SERPL-SCNC: 28 MMOL/L
EOSINOPHIL NFR BLD AUTO: 0.1 % (ref 0–4.3)
HCO3 BLDV-SCNC: 26.5 MMOL/L
HCT VFR BLD CALC: 52.5 % (ref 35.5–45.6)
HGB BLD-MCNC: 17.3 GM/DL (ref 11.8–15.2)
INR PPP: 1.12 (ref 0.87–1.13)
ISTAT PH: 7.37 (ref 7.35–7.45)
MCH RBC QN AUTO: 31 PG (ref 28–32)
MCHC RBC AUTO-ENTMCNC: 33 % (ref 32–34)
MCV RBC AUTO: 93 FL (ref 84–94)
PCO2 BLDA: 46.3 MM[HG] (ref 35–45)
PLATELET # BLD: 190 K/MM3 (ref 140–440)
PO2 BLDCOA: 81 MM[HG] (ref 80–105)
RBC # BLD AUTO: 5.63 M/MM3 (ref 3.65–5.03)
SAO2 % BLDC: 95 %
WBC # BLD AUTO: 16.7 K/MM3 (ref 4.5–11)

## 2017-06-29 PROCEDURE — B41F1ZZ FLUOROSCOPY OF RIGHT LOWER EXTREMITY ARTERIES USING LOW OSMOLAR CONTRAST: ICD-10-PCS | Performed by: RADIOLOGY

## 2017-06-29 PROCEDURE — 04CY3ZZ EXTIRPATION OF MATTER FROM LOWER ARTERY, PERCUTANEOUS APPROACH: ICD-10-PCS | Performed by: RADIOLOGY

## 2017-06-29 PROCEDURE — 047P3ZZ DILATION OF RIGHT ANTERIOR TIBIAL ARTERY, PERCUTANEOUS APPROACH: ICD-10-PCS | Performed by: RADIOLOGY

## 2017-06-29 PROCEDURE — 3E05317 INTRODUCTION OF OTHER THROMBOLYTIC INTO PERIPHERAL ARTERY, PERCUTANEOUS APPROACH: ICD-10-PCS | Performed by: RADIOLOGY

## 2017-06-29 RX ADMIN — PREDNISONE SCH MG: 10 TABLET ORAL at 09:23

## 2017-06-29 RX ADMIN — LOSARTAN POTASSIUM SCH MG: 50 TABLET, FILM COATED ORAL at 09:22

## 2017-06-29 RX ADMIN — OXYCODONE AND ACETAMINOPHEN PRN TAB: 5; 325 TABLET ORAL at 05:54

## 2017-06-29 RX ADMIN — OXYCODONE AND ACETAMINOPHEN PRN TAB: 5; 325 TABLET ORAL at 14:27

## 2017-06-29 RX ADMIN — FUROSEMIDE SCH MG: 40 TABLET ORAL at 05:35

## 2017-06-29 RX ADMIN — DILTIAZEM HYDROCHLORIDE SCH MLS/HR: 100 INJECTION, POWDER, LYOPHILIZED, FOR SOLUTION INTRAVENOUS at 05:36

## 2017-06-29 RX ADMIN — OXYCODONE AND ACETAMINOPHEN PRN TAB: 5; 325 TABLET ORAL at 10:24

## 2017-06-29 NOTE — PROGRESS NOTE
Assessment and Plan





1. Right foot ischemic pain. Overall stable. tPA infusion performed for 24 

hours. Re-evaluation yesterday demonstrates little impovement. Collateral flow 

into the foot.


2. Discussed with patient findings.


3. A-fib. Better controlled. Cardiology following.


4. Patient will need long term anticoagulation (Eliquis)


5. Will consider vascular surgery consult based on his ambulation today prior 

to discharge.








Subjective


Date of service: 06/29/17


Principal diagnosis: Right foot ischemic rest pain


Interval history: 





Patient is s/p overnight infusion of tpa. He states he does not have any 

numbness in his foot any longer. Foot still feels cold. Intermittent pain which 

is relieved by percocet. 


Has no been ambulatory as of yet. 








Objective





- Constitutional


Vitals: 


 Vital Signs - 12hr











  06/28/17 06/28/17 06/28/17





  21:00 21:10 21:13


 


Temperature  98.4 F 98.5 F


 


Pulse Rate 113 H  134 H


 


Pulse Rate [   





Right Dorsalis   





Pedis]   


 


Respiratory 13 20 22





Rate   


 


Blood Pressure 148/79 145/68 135/76


 


O2 Sat by Pulse 93 98 98





Oximetry   














  06/28/17 06/28/17 06/28/17





  21:15 21:16 21:30


 


Temperature   


 


Pulse Rate  114 H 98 H


 


Pulse Rate [   





Right Dorsalis   





Pedis]   


 


Respiratory 20 14 18





Rate   


 


Blood Pressure  145/101 140/104


 


O2 Sat by Pulse  93 94





Oximetry   














  06/28/17 06/28/17 06/28/17





  21:46 22:00 22:16


 


Temperature   


 


Pulse Rate 107 H 108 H 88


 


Pulse Rate [   





Right Dorsalis   





Pedis]   


 


Respiratory 22 20 15





Rate   


 


Blood Pressure 139/110 145/119 125/83


 


O2 Sat by Pulse 94 97 93





Oximetry   














  06/28/17 06/28/17 06/28/17





  22:30 22:46 23:00


 


Temperature   


 


Pulse Rate 82 97 H 87


 


Pulse Rate [   





Right Dorsalis   





Pedis]   


 


Respiratory 13 22 20





Rate   


 


Blood Pressure 125/88 137/82 167/98


 


O2 Sat by Pulse 94 94 98





Oximetry   














  06/28/17 06/28/17 06/28/17





  23:15 23:30 23:34


 


Temperature   


 


Pulse Rate 85 83 102 H


 


Pulse Rate [  89 





Right Dorsalis   





Pedis]   


 


Respiratory 18 16 21





Rate   


 


Blood Pressure 128/96 138/101 138/101


 


O2 Sat by Pulse 95 99 96





Oximetry   














  06/28/17 06/28/17 06/29/17





  23:40 23:46 00:00


 


Temperature   98.9 F


 


Pulse Rate  91 H 89


 


Pulse Rate [   





Right Dorsalis   





Pedis]   


 


Respiratory 20 21 22





Rate   


 


Blood Pressure  131/86 129/90


 


O2 Sat by Pulse  96 96





Oximetry   














  06/29/17 06/29/17 06/29/17





  00:15 00:30 00:45


 


Temperature   


 


Pulse Rate 102 H 83 78


 


Pulse Rate [   





Right Dorsalis   





Pedis]   


 


Respiratory 22 19 15





Rate   


 


Blood Pressure 125/83 134/80 130/83


 


O2 Sat by Pulse 95 95 94





Oximetry   














  06/29/17 06/29/17 06/29/17





  01:00 01:15 01:30


 


Temperature   


 


Pulse Rate 87 79 86


 


Pulse Rate [   





Right Dorsalis   





Pedis]   


 


Respiratory 12 16 12





Rate   


 


Blood Pressure 136/88 128/85 137/84


 


O2 Sat by Pulse 96 96 88





Oximetry   














  06/29/17 06/29/17 06/29/17





  01:46 02:00 02:16


 


Temperature   


 


Pulse Rate 91 H 68 55 L


 


Pulse Rate [   





Right Dorsalis   





Pedis]   


 


Respiratory 13 12 14





Rate   


 


Blood Pressure 112/68 113/71 117/63


 


O2 Sat by Pulse 85 90 84





Oximetry   














  06/29/17 06/29/17 06/29/17





  02:30 02:45 03:00


 


Temperature   


 


Pulse Rate 73 72 71


 


Pulse Rate [   





Right Dorsalis   





Pedis]   


 


Respiratory 11 L 15 14





Rate   


 


Blood Pressure 114/72 112/69 102/70


 


O2 Sat by Pulse 94 89 91





Oximetry   














  06/29/17 06/29/17 06/29/17





  03:15 03:30 03:46


 


Temperature   


 


Pulse Rate 58 L 72 76


 


Pulse Rate [   





Right Dorsalis   





Pedis]   


 


Respiratory 17 12 15





Rate   


 


Blood Pressure 94/51 116/78 114/72


 


O2 Sat by Pulse 89  91





Oximetry   














  06/29/17 06/29/17 06/29/17





  04:00 04:16 04:30


 


Temperature 98.6 F  


 


Pulse Rate 73 58 L 76


 


Pulse Rate [   





Right Dorsalis   





Pedis]   


 


Respiratory 13 13 19





Rate   


 


Blood Pressure 125/83 127/74 124/91


 


O2 Sat by Pulse  92 91





Oximetry   














  06/29/17 06/29/17 06/29/17





  04:45 05:00 05:15


 


Temperature   


 


Pulse Rate 54 L 68 71


 


Pulse Rate [   





Right Dorsalis   





Pedis]   


 


Respiratory 15 14 18





Rate   


 


Blood Pressure 122/84 130/85 122/82


 


O2 Sat by Pulse 92 94 93





Oximetry   














  06/29/17 06/29/17 06/29/17





  05:30 05:45 06:00


 


Temperature   


 


Pulse Rate 64 71 63


 


Pulse Rate [   





Right Dorsalis   





Pedis]   


 


Respiratory 14 11 L 20





Rate   


 


Blood Pressure 126/88 128/82 125/84


 


O2 Sat by Pulse 91 95 94





Oximetry   














  06/29/17 06/29/17 06/29/17





  06:15 06:30 06:46


 


Temperature   


 


Pulse Rate 71 65 58 L


 


Pulse Rate [   





Right Dorsalis   





Pedis]   


 


Respiratory 16 19 19





Rate   


 


Blood Pressure 121/82 154/107 129/72


 


O2 Sat by Pulse 93 95 88





Oximetry   














  06/29/17 06/29/17 06/29/17





  07:00 07:16 07:30


 


Temperature   


 


Pulse Rate 69 72 71


 


Pulse Rate [   





Right Dorsalis   





Pedis]   


 


Respiratory 16 12 20





Rate   


 


Blood Pressure 119/62 131/86 139/85


 


O2 Sat by Pulse 82 L 94 





Oximetry   











General appearance: Present: no acute distress





- Respiratory


Respiratory effort: normal





- Cardiovascular


Rhythm: irregularly irregular


Extremity abnormal: pulses diminished (Right foot pulses continue to be absent 

without doppler signal. Right foot remains cool compared to the left. No 

signficant change overall. Normal sensation to light touch. Capillary refill <5 

sec. Symmetric color bilaterally. No ulcerations present.)





- Labs


CBC & Chem 7: 


 06/29/17 00:09





 06/28/17 12:37


Labs: 


 Abnormal lab results











  06/28/17 06/28/17 06/28/17 Range/Units





  12:29 12:37 23:10 


 


WBC  17.2 H    (4.5-11.0)  K/mm3


 


RBC  5.79 H    (3.65-5.03)  M/mm3


 


Hgb  17.3 H    (11.8-15.2)  gm/dl


 


Hct  54.2 H    (35.5-45.6)  %


 


RDW  15.6 H    (13.2-15.2)  %


 


Lymph % (Auto)  6.6 L    (13.4-35.0)  %


 


Mono % (Auto)  7.6 H    (0.0-7.3)  %


 


Lymph #  1.1 L    (1.2-5.4)  K/mm3


 


Mono #  1.3 H    (0.0-0.8)  K/mm3


 


Seg Neutrophils %  85.2 H    (40.0-70.0)  %


 


Seg Neutrophils #  14.7 H    (1.8-7.7)  K/mm3


 


PT    15.0 H  (12.2-14.9)  Sec.


 


POC ABG pCO2     (35-45)  


 


Potassium   5.1 H   (3.6-5.0)  mmol/L


 


Glucose   191 H   ()  mg/dL














  06/29/17 06/29/17 Range/Units





  00:09 00:25 


 


WBC  16.7 H   (4.5-11.0)  K/mm3


 


RBC  5.63 H   (3.65-5.03)  M/mm3


 


Hgb  17.3 H   (11.8-15.2)  gm/dl


 


Hct  52.5 H   (35.5-45.6)  %


 


RDW  15.6 H   (13.2-15.2)  %


 


Lymph % (Auto)  6.1 L   (13.4-35.0)  %


 


Mono % (Auto)    (0.0-7.3)  %


 


Lymph #  1.0 L   (1.2-5.4)  K/mm3


 


Mono #    (0.0-0.8)  K/mm3


 


Seg Neutrophils %  90.0 H   (40.0-70.0)  %


 


Seg Neutrophils #  15.0 H   (1.8-7.7)  K/mm3


 


PT    (12.2-14.9)  Sec.


 


POC ABG pCO2   46.3 H  (35-45)  


 


Potassium    (3.6-5.0)  mmol/L


 


Glucose    ()  mg/dL

## 2017-06-29 NOTE — CONSULTATION
History of Present Illness





- Reason for Consult


Consult date: 06/29/17


Requesting physician: SERGIO CORRIGAN





- History of Present Illness





This patient is a 37-year-old -American male that was admitted on 06/27/ 2017 due to right lower extremity ischemia.  He has a history of atrial 

fibrillation.  Unfortunately, anticoagulation had to be held due to hematuria.  

He was advised to follow with urology (due to kidney stones), but has not done 

so thus far.  He developed right lower extremity discomfort approximately 5-6 

weeks ago.  He was evaluated by Dr Corrigan as an outpatient, and set up for 

angiography.  This revealed evidence of distal mobilization involving the right 

anterior tibial, peroneal, and posterior tibial arteries.  Mechanical 

thrombectomy was attempted, without significant improvement.  Thrombolysis was 

performed overnight utilizing an EKOS catheter.  Postoperatively the patient 

continued to have a cool pale right foot with distal discomfort.  A Vascular 

surgery consult is requested to further evaluate.





Past History


Past Medical History: atrial fib, COPD, heart failure (with a reported ejection 

fraction of approximately 25%), hypertension, other (renal stones; sleep apnea)


Past Surgical History: Other (coronary stent)


Social history: lives with family, smoking (1 pack of cigarettes per day)


Family history: diabetes





Medications and Allergies


 Allergies











Allergy/AdvReac Type Severity Reaction Status Date / Time


 


ibuprofen [From Motrin] Allergy  Swelling Verified 11/02/16 13:24


 


lisinopril Allergy  COUGH Verified 11/02/16 13:24


 


metoprolol Allergy  Nausea Verified 11/02/16 13:24


 


Iodinated Contrast Media - AdvReac  KIDNEYS Verified 11/02/16 13:24





IV Dye   SHUT DOWN  











 Home Medications











 Medication  Instructions  Recorded  Confirmed  Last Taken  Type


 


Digoxin [Lanoxin] 0.25 mg PO DAILY@1700 #30 tablet 01/05/15 06/27/17 06/26/17 Rx


 


Aspirin [Aspirin TAB] 325 mg PO QDAY 06/27/17 06/27/17 06/26/17 History


 


Furosemide [Lasix TAB] 40 mg PO QDAY 06/27/17 06/27/17 06/26/17 History


 


Losartan [Cozaar] 50 mg PO QDAY 06/27/17 06/27/17 06/26/17 History














Review of Systems


All systems: negative





Exam





- Constitutional


Vitals: 


 











Temp Pulse Resp BP Pulse Ox


 


 97.6 F   117 H  21   140/95   91 


 


 06/29/17 12:00  06/29/17 12:16  06/29/17 12:16  06/29/17 12:16  06/29/17 12:16











General appearance: Present: no acute distress





- EENT


Eyes: Present: EOM intact


ENT: hearing intact





- Neck


Neck: Present: supple





- Respiratory


Respiratory effort: normal





- Extremities


Extremity abnormal: pulses diminished (nonpalpable pedal pulses on the right, 

palpable dorsalis pedis pulse on the left), other (patient's right foot is 

mildly pale and cool from his ankle to his toes.  His skin appears viable 

without noticeable skin lesions)





- Psychiatric


Psychiatric: appropriate mood/affect, intact judgment & insight, cooperative





- Neurologic


Neurologic: no focal deficits (motor and sensory function to the right foot 

appeared to be intact), moves all extremities





Results





- Labs


CBC & Chem 7: 


 06/29/17 00:09





 06/28/17 12:37


Labs: 


 Abnormal lab results











  06/28/17 06/29/17 06/29/17 Range/Units





  23:10 00:09 00:25 


 


WBC   16.7 H   (4.5-11.0)  K/mm3


 


RBC   5.63 H   (3.65-5.03)  M/mm3


 


Hgb   17.3 H   (11.8-15.2)  gm/dl


 


Hct   52.5 H   (35.5-45.6)  %


 


RDW   15.6 H   (13.2-15.2)  %


 


Lymph % (Auto)   6.1 L   (13.4-35.0)  %


 


Lymph #   1.0 L   (1.2-5.4)  K/mm3


 


Seg Neutrophils %   90.0 H   (40.0-70.0)  %


 


Seg Neutrophils #   15.0 H   (1.8-7.7)  K/mm3


 


PT  15.0 H    (12.2-14.9)  Sec.


 


POC ABG pCO2    46.3 H  (35-45)  














Assessment and Plan





This patient has a history of atrial fibrillation.  Anticoagulation had to be 

held due to hematuria.  He developed right lower extremity pain approximate 5-6 

weeks ago.  Angiography was performed which revealed embolic disease to the 

tibial vessels.  Mechanical thrombectomy and thrombolysis were attempted, but 

unsuccessful in restoring named blood vessels to his foot.  He has multiple 

small collaterals.  A vascular surgery consult was requested to evaluate for 

surgical options.  Percutaneous intervention has been attempted.  Unfortunately

, bypass is unlikely to be successful as he does not have a target vessel.  If 

a bypass were attempted and unsuccessful, it would likely necessitate 

amputation.





Indications for amputation were discussed with the patient: Ascending infection

, uncontrollable pain, or gangrene/uncontrolled wounds.





At this point the patient does have mild-to-moderate discomfort (and he seems 

to tolerate).  Although, his foot is presently viable.  He has retained motor 

and sensory function at this point.  He likely has decreased ability to heal 

distal wounds.  We stressed he should avoid wounds and provide good foot care 

as this could escalate quickly and necessitate amputation.  We stressed to the 

patient he should stop smoking immediately, as this likely increases the risk 

of amputation.





He should follow up in our office in 1-2 weeks.  If his condition deteriorates 

he can contact our office to be seen sooner if needed.





- Patient Problems


(1) Ischemia of right lower extremity


Status: Acute   





(2) Afib


Status: Chronic   


Qualifiers: 


   Atrial fibrillation type: A 





(3) Tobacco abuse


Status: Acute   





(4) History of noncompliance with medical treatment


Status: Acute   





(5) Cardiomyopathy


Status: Chronic   


Qualifiers: 


   Cardiomyopathy type: C 





(6) COPD (chronic obstructive pulmonary disease)


Status: Acute   


Qualifiers: 


   COPD type: C   Chronic bronchitis type: C   Emphysema type: E 





(7) Obstructive sleep apnea


Status: Acute

## 2017-06-29 NOTE — PROGRESS NOTE
Assessment and Plan





- Patient Problems


(1) Digital arterial occlusive disease


Status: Acute   


Plan to address problem: 





- s/p vascular surgery intervention


- clinically improved


- will defer








(2) Atrial fibrillation with RVR


Status: Acute   


Plan to address problem: 





- seen by cardiology


- rate control meds resumed (Digoxin; metoprolol)


- on eliquis


- better compliance counselled








(3) Congestive heart failure


Status: Acute   


Qualifiers: 


   Congestive heart failure type: C   Congestive heart failure chronicity: C 


Plan to address problem: 





- clinically stable


- per cardiology








Subjective


Date of service: 06/29/17


Principal diagnosis: ROBINSON; CHF; Right foot ischemic rest pain


Interval history: 





Seen and examined at bedside; 24 hour events reviewed; nursing and respiratory 

care staff consulted; no adverse overnight events reported to me; resting in bed

; denies acute chest pains or increased SOB; refused CPAP therapy overnight; 

seen by cardiology and anticoagulation recommended





Objective


 Vital Signs - 12hr











  06/29/17 06/29/17 06/29/17





  01:30 01:46 02:00


 


Temperature   


 


Pulse Rate 86 91 H 68


 


Pulse Rate [   





Left From   





Monitor]   


 


Respiratory 12 13 12





Rate   


 


Blood Pressure 137/84 112/68 113/71


 


O2 Sat by Pulse 88 85 90





Oximetry   














  06/29/17 06/29/17 06/29/17





  02:16 02:30 02:45


 


Temperature   


 


Pulse Rate 55 L 73 72


 


Pulse Rate [   





Left From   





Monitor]   


 


Respiratory 14 11 L 15





Rate   


 


Blood Pressure 117/63 114/72 112/69


 


O2 Sat by Pulse 84 94 89





Oximetry   














  06/29/17 06/29/17 06/29/17





  03:00 03:15 03:30


 


Temperature   


 


Pulse Rate 71 58 L 72


 


Pulse Rate [   





Left From   





Monitor]   


 


Respiratory 14 17 12





Rate   


 


Blood Pressure 102/70 94/51 116/78


 


O2 Sat by Pulse 91 89 





Oximetry   














  06/29/17 06/29/17 06/29/17





  03:46 04:00 04:16


 


Temperature  98.6 F 


 


Pulse Rate 76 73 58 L


 


Pulse Rate [   





Left From   





Monitor]   


 


Respiratory 15 13 13





Rate   


 


Blood Pressure 114/72 125/83 127/74


 


O2 Sat by Pulse 91  92





Oximetry   














  06/29/17 06/29/17 06/29/17





  04:30 04:45 05:00


 


Temperature   


 


Pulse Rate 76 54 L 68


 


Pulse Rate [   





Left From   





Monitor]   


 


Respiratory 19 15 14





Rate   


 


Blood Pressure 124/91 122/84 130/85


 


O2 Sat by Pulse 91 92 94





Oximetry   














  06/29/17 06/29/17 06/29/17





  05:15 05:30 05:45


 


Temperature   


 


Pulse Rate 71 64 71


 


Pulse Rate [   





Left From   





Monitor]   


 


Respiratory 18 14 11 L





Rate   


 


Blood Pressure 122/82 126/88 128/82


 


O2 Sat by Pulse 93 91 95





Oximetry   














  06/29/17 06/29/17 06/29/17





  06:00 06:15 06:30


 


Temperature   


 


Pulse Rate 63 71 65


 


Pulse Rate [   





Left From   





Monitor]   


 


Respiratory 20 16 19





Rate   


 


Blood Pressure 125/84 121/82 154/107


 


O2 Sat by Pulse 94 93 95





Oximetry   














  06/29/17 06/29/17 06/29/17





  06:46 07:00 07:16


 


Temperature   


 


Pulse Rate 58 L 69 72


 


Pulse Rate [   





Left From   





Monitor]   


 


Respiratory 19 16 12





Rate   


 


Blood Pressure 129/72 119/62 131/86


 


O2 Sat by Pulse 88 82 L 94





Oximetry   














  06/29/17 06/29/17 06/29/17





  07:30 07:46 08:00


 


Temperature   97.9 F


 


Pulse Rate 71 78 74


 


Pulse Rate [   92 H





Left From   





Monitor]   


 


Respiratory 20 17 12





Rate   


 


Blood Pressure 139/85 127/79 133/87


 


O2 Sat by Pulse  91 92





Oximetry   














  06/29/17 06/29/17 06/29/17





  08:15 08:30 08:46


 


Temperature   


 


Pulse Rate 74 75 92 H


 


Pulse Rate [   





Left From   





Monitor]   


 


Respiratory 11 L 13 17





Rate   


 


Blood Pressure 146/102 135/92 136/99


 


O2 Sat by Pulse 91 93 91





Oximetry   














  06/29/17 06/29/17 06/29/17





  09:00 09:15 09:22


 


Temperature   


 


Pulse Rate 81 87 93 H


 


Pulse Rate [   





Left From   





Monitor]   


 


Respiratory 17 13 





Rate   


 


Blood Pressure 135/92 114/75 125/84


 


O2 Sat by Pulse 90 94 





Oximetry   














  06/29/17 06/29/17 06/29/17





  09:30 09:45 10:00


 


Temperature   


 


Pulse Rate 76 84 95 H


 


Pulse Rate [   95 H





Left From   





Monitor]   


 


Respiratory 14 18 18





Rate   


 


Blood Pressure 125/77 135/78 134/75


 


O2 Sat by Pulse 94 92 99





Oximetry   














  06/29/17 06/29/17 06/29/17





  10:16 10:30 10:46


 


Temperature   


 


Pulse Rate 89 81 77


 


Pulse Rate [   





Left From   





Monitor]   


 


Respiratory 15 10 L 20





Rate   


 


Blood Pressure 134/75 116/67 120/72


 


O2 Sat by Pulse 95 95 92





Oximetry   














  06/29/17 06/29/17 06/29/17





  11:00 11:16 11:30


 


Temperature   


 


Pulse Rate 93 H 78 87


 


Pulse Rate [   





Left From   





Monitor]   


 


Respiratory 18 21 17





Rate   


 


Blood Pressure 120/72 138/100 140/102


 


O2 Sat by Pulse 97 93 





Oximetry   














  06/29/17 06/29/17 06/29/17





  11:46 12:00 12:16


 


Temperature  97.6 F 


 


Pulse Rate 102 H 108 H 117 H


 


Pulse Rate [  95 H 





Left From   





Monitor]   


 


Respiratory 22 21 21





Rate   


 


Blood Pressure 121/96 121/96 140/95


 


O2 Sat by Pulse 90 92 91





Oximetry   











Constitutional: no acute distress, alert


Eyes: non-icteric


ENT: oropharynx moist


Neck: supple, no lymphadenopathy


Effort: normal


Ascultation: Bilateral: clear


Cardiovascular: irregular rhythm


Gastrointestinal: normoactive bowel sounds, soft, non-tender, non-distended


Integumentary: normal


Extremities: no cyanosis, no edema, pulses normal, other (s/p EkOS for right 

foot digital ischemia)


Neurologic: normal mental status, non-focal exam, pupils equal and round, motor 

strength normal and


Psychiatric: mood appropriate, affect normal


CBC and BMP: 


 06/29/17 00:09





 06/28/17 12:37


ABG, PT/INR, D-dimer: 


ABG











POC ABG pH  7.366  (7.35-7.45)   06/29/17  00:25    


 


POC ABG pCO2  46.3  (35-45)  H  06/29/17  00:25    


 


POC ABG pO2  81  ()   06/29/17  00:25    


 


POC ABG HCO3  26.5   06/29/17  00:25    


 


POC ABG Total CO2  28   06/29/17  00:25    


 


POC ABG O2 Sat  95   06/29/17  00:25    





PT/INR, D-dimer











PT  15.0 Sec. (12.2-14.9)  H  06/28/17  23:10    


 


INR  1.12  (0.87-1.13)   06/28/17  23:10    








Abnormal lab findings: 


 Abnormal Labs











  06/27/17 06/27/17 06/27/17





  12:55 16:30 21:56


 


WBC    20.1 H


 


RBC    5.55 H


 


Hgb    16.8 H


 


Hct    51.9 H


 


RDW    15.9 H


 


Lymph % (Auto)    6.0 L


 


Mono % (Auto)   


 


Lymph #   


 


Kidder #    1.5 H


 


Seg Neutrophils %    86.3 H


 


Seg Neutrophils #    17.3 H


 


PT   15.6 H 


 


INR   1.18 H 


 


APTT   171.4 H* 


 


POC ABG pCO2   


 


Sodium   


 


Potassium   


 


BUN  23 H  


 


Glucose   














  06/27/17 06/28/17 06/28/17





  22:00 04:21 12:29


 


WBC   17.9 H  17.2 H


 


RBC   5.53 H  5.79 H


 


Hgb   16.6 H  17.3 H


 


Hct   51.7 H  54.2 H


 


RDW   16.0 H  15.6 H


 


Lymph % (Auto)   7.6 L  6.6 L


 


Mono % (Auto)   9.6 H  7.6 H


 


Lymph #    1.1 L


 


Mono #   1.7 H  1.3 H


 


Seg Neutrophils %   82.7 H  85.2 H


 


Seg Neutrophils #   14.8 H  14.7 H


 


PT   


 


INR   


 


APTT   


 


POC ABG pCO2   


 


Sodium  134 L  


 


Potassium  5.2 H  


 


BUN  21 H  


 


Glucose  259 H  














  06/28/17 06/28/17 06/29/17





  12:37 23:10 00:09


 


WBC    16.7 H


 


RBC    5.63 H


 


Hgb    17.3 H


 


Hct    52.5 H


 


RDW    15.6 H


 


Lymph % (Auto)    6.1 L


 


Mono % (Auto)   


 


Lymph #    1.0 L


 


Mono #   


 


Seg Neutrophils %    90.0 H


 


Seg Neutrophils #    15.0 H


 


PT   15.0 H 


 


INR   


 


APTT   


 


POC ABG pCO2   


 


Sodium   


 


Potassium  5.1 H  


 


BUN   


 


Glucose  191 H  














  06/29/17





  00:25


 


WBC 


 


RBC 


 


Hgb 


 


Hct 


 


RDW 


 


Lymph % (Auto) 


 


Mono % (Auto) 


 


Lymph # 


 


Mono # 


 


Seg Neutrophils % 


 


Seg Neutrophils # 


 


PT 


 


INR 


 


APTT 


 


POC ABG pCO2  46.3 H


 


Sodium 


 


Potassium 


 


BUN 


 


Glucose

## 2017-06-30 NOTE — DISCHARGE SUMMARY
Providers





- Providers


Date of Admission: 


06/27/17 16:45





Date of discharge: 06/29/17


Attending physician: 


SERGIO CORRIGAN





 





06/28/17 22:17


Consult to Physician [CONS] Urgent 


   Consulting Provider: SERGIO CORRIGAN


   Reason For Exam: Ekos


   Place consult to:: [facility]


   Notified:: yes


   Was contact made?: Yes


   If yes, spoke with:: anthony





06/28/17 22:26


Consult to Physician [CONS] Urgent 


   Consulting Provider: KENNY CRUZ


   Reason For Exam: admit to ICU


   Place consult to:: [facility]


   Notified:: yes


   Was contact made?: Yes


   If yes, spoke with:: Dr cruz


   Time called:: 21:00





06/28/17 22:36


Consult to Physician [CONS] Urgent 


   Consulting Provider: CLAYTON BULL


   Reason For Exam: Atrial fibrilation


   Notified:: no





06/29/17 12:59


Consult to Physician [CONS] Routine 


   Consulting Provider: ZARIA GUERRA


   Reason For Exam: Right foot ischemia


   Place consult to:: Zaria Guerra


   Notified:: yes


   Phone number called:: [phone]


   Was contact made?: Yes


   If yes, spoke with:: Zaria Guerra


   Time called:: 13:30











Primary care physician: 


CAYLA OHARA








Hospitalization


Reason for admission: Right foot rest pain


Condition: Fair


Pertinent studies: 





Right lower extremity arteriogram


Procedures: 





Right lower extremity angiography


Right lower extremity thrombectomy and thrombolysis and angioplasty


Hospital course: 





Stable without complication


Disposition: DC-01 TO HOME OR SELFCARE





- Discharge Diagnoses


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


Status: Acute   





Core Measure Documentation





- Palliative Care


Palliative Care/ Comfort Measures: Not Applicable





- Core Measures


Any of the following diagnoses?: heart failure, none





- VTE Discharge Requirements


Deep Vein Thrombosis/Pulmonary Embolism Present on Admission: No





- Acute MI Discharge Requirements


Aspirin at discharge: Yes





- Heart Failure Discharge Requirements


ACE/ARB for LVSD if EF <40%: Yes


Beta blocker at discharge: Yes





- Stroke Discharge Requirements


Statin for LDL = or >70 mg/dl on DC: No





Exam





- Constitutional


Vitals: 


 











Temp Pulse Resp BP Pulse Ox


 


 97.6 F   117 H  21   140/95   91 


 


 06/29/17 12:00  06/29/17 12:16  06/29/17 12:16  06/29/17 12:16  06/29/17 12:16











General appearance: Present: mild distress





- EENT


Eyes: Present: PERRL


ENT: hearing intact





- Neck


Neck: Present: supple





- Respiratory


Respiratory effort: normal





- Cardiovascular


Rhythm: irregularly irregular





- Extremities


Extremity abnormal: pulses diminished (Non palpable on the right. Foot is cool. 

No ulcerations or evidence for gangrene)





Plan


Activity: advance as tolerated


Weight Bearing Status: Full Weight Bearing


Diet: regular


Special Instructions: smoking cessation, other (Need careful foot care and 

avoid any cuts or wounds. )


Follow up with: 


CAYLA OHARA MD [Primary Care Provider] - 7 Days


SERGIO CORRIGAN MD [Staff Physician] - 7 Days

## 2017-07-01 NOTE — OPERATIVE REPORT
Operative Report


Operative Report: 





Procedure:


Right lower extremity arteriogram


Right lower extremity thrombectomy


Right lower extremity thrombolysis


Right anterior tibial artery angioplasty





Indication: Right foot ischemic rest pain





Physician: Gianni





Anesthesia: Provided by anesthesia service





Contrast: 125 cc visipaque





Procedure: Following informed and written consent the patient was taken to the 

angiographic suite and the left groin was prepped and draped in the usual 

sterile fashion. Lidocaine was used for local anesthetic and using ultrasound 

guidance the left common femoral artery and a micropuncture access set utilized 

and then the dilator exchanged for a 5 Fr vascular sheath. A rim catheter was 

utilized the cross over the bifurcation with the aid of a glide wire and this 

was exchanged for a 5 fr vertebral catheter which was advanced into the 

popliteal artery. Selective DSA runs were made to the foot. A 6 Fr cross over 

sheath was inserted.  A v-18 guidewire was then advanced into the anterior 

tibial artery and a rubicon catheter along with the guidewire advanced into the 

distal AT. Selective injection was performed here as well. 4 mg of tPA was 

pulse sprayed into the AT. 200 mcg of nitroglycerin IA was also given (wtih a 

total of 800 mcq given throughout the procedure). Also 5000 units of heparin 

was given IV. The catheter was exchanged for a CAT-3 penumbra thrombectomy 

catheter and multiple passes with the catheter and separator was performed 

using vacuum assisted suction. Contrast was injected which demonstrated overall 

mild improvement. At this point another 4mg of tPA IA was given and an 

additional 1000 units of heparin intravenously. The anterior tibial artery was 

dilated witha 2 x 125 mm balloon from distal to mid level. This did not resolve 

the occlusion. Another pass with the CAT-3 was performed. The AT remained 

thombosed. AT this point an EKOS catheter was advanced into the anterior tibial 

artery and overnight infusion was established with the catheter and sheath 

secured to the left groin with a 3-0 ethilon suture and tegaderm. Infusion rate 

was set a 1mg per hour with standard EKOS protocol. The patient was transferred 

to the ICU for observation and re-evaluated tomorrow.





Findings: Thee is evidence for subacute to chronic thrombus resulting in 

occlusion of the right mid anterior tibial artery and distal peroneal and 

posterior tibial arteries. The popliteal artery is widely patent. The remainder 

of the right lower extremity was unremarkable from previous recent arteriogram. 

There is extensive collateral flow from the peroneal resulting in 

reconstitution of collaterals to the dorsal foot. There is no visible distal AT 

or DP. The peroneal and posterior tibial arteries supply collateral flow to the 

plantar arch. Following thrombectomy, thrombolysis and angioplasty there was 

mild improvement.





Impression:


1. Occlusion of right mid AT, distal peroneal and distal PT.


2. This is consistent with subacute to chronic thrombus likely from embolus 5-6 

weeks ago.


3. Minimal improvement following intervention


4. Patient will placed on overnight infusion of thrombolytic and evaluated 

tomorrow

## 2017-10-24 ENCOUNTER — HOSPITAL ENCOUNTER (OUTPATIENT)
Dept: HOSPITAL 5 - PT | Age: 37
Discharge: HOME | End: 2017-10-24
Attending: PSYCHIATRY & NEUROLOGY
Payer: MEDICARE

## 2017-10-24 DIAGNOSIS — I69.952: Primary | ICD-10-CM

## 2017-10-24 PROCEDURE — 74230 X-RAY XM SWLNG FUNCJ C+: CPT

## 2017-10-24 PROCEDURE — 92611 MOTION FLUOROSCOPY/SWALLOW: CPT

## 2017-10-24 NOTE — FLUOROSCOPY REPORT
MODIFIED BARIUM SWALLOW



History: dysphagia.



Findings: Video radiography was provided by the radiologist for speech 

therapy to assess the swallowing mechanism.  Please refer to the formal 

report by speech therapy.



Impression: Successful modified barium swallow.

## 2020-02-27 ENCOUNTER — HOSPITAL ENCOUNTER (EMERGENCY)
Dept: HOSPITAL 5 - ED | Age: 40
Discharge: HOME | End: 2020-02-27
Payer: MEDICARE

## 2020-02-27 VITALS — DIASTOLIC BLOOD PRESSURE: 97 MMHG | SYSTOLIC BLOOD PRESSURE: 146 MMHG

## 2020-02-27 DIAGNOSIS — Z88.6: ICD-10-CM

## 2020-02-27 DIAGNOSIS — Z86.73: ICD-10-CM

## 2020-02-27 DIAGNOSIS — Z87.442: ICD-10-CM

## 2020-02-27 DIAGNOSIS — J44.9: ICD-10-CM

## 2020-02-27 DIAGNOSIS — Z88.8: ICD-10-CM

## 2020-02-27 DIAGNOSIS — I25.2: ICD-10-CM

## 2020-02-27 DIAGNOSIS — F17.200: ICD-10-CM

## 2020-02-27 DIAGNOSIS — Z95.5: ICD-10-CM

## 2020-02-27 DIAGNOSIS — Z79.899: ICD-10-CM

## 2020-02-27 DIAGNOSIS — Z91.041: ICD-10-CM

## 2020-02-27 DIAGNOSIS — I11.0: ICD-10-CM

## 2020-02-27 DIAGNOSIS — I50.9: ICD-10-CM

## 2020-02-27 DIAGNOSIS — I48.91: ICD-10-CM

## 2020-02-27 DIAGNOSIS — K08.89: Primary | ICD-10-CM

## 2020-02-27 PROCEDURE — 99282 EMERGENCY DEPT VISIT SF MDM: CPT

## 2020-02-27 NOTE — EMERGENCY DEPARTMENT REPORT
ED General Adult HPI





- General


Chief complaint: Dental/Oral


Stated complaint: TOOTHACHE


Time Seen by Provider: 02/27/20 04:39


Source: patient


Mode of arrival: Ambulatory


Limitations: No Limitations





- History of Present Illness


Initial comments: 


39 y.o. male with PMHx of cardiomyopathy, CHF, COPD and CVA presents with 

complaint of dental pain.  Patient complains of right upper molar pain.  Patient

states his tooth broke 2 months ago but states that today after eating a meal 

that the pain worsened for the past 12 hours and Tylenol was not controlling.  

Patient denies any sore throat.  Patient denies any facial swelling.  Patient 

denies any chest pain or shortness of breath.





- Related Data


                                Home Medications











 Medication  Instructions  Recorded  Confirmed  Last Taken


 


Aspirin 325 mg PO QDAY 06/27/17 06/27/17 06/26/17


 


Furosemide [Lasix TAB] 40 mg PO QDAY 06/27/17 06/27/17 06/26/17


 


Losartan [Cozaar] 50 mg PO QDAY 06/27/17 06/27/17 06/26/17








                                  Previous Rx's











 Medication  Instructions  Recorded  Last Taken  Type


 


Digoxin [Lanoxin] 0.25 mg PO DAILY@1700 #30 tablet 01/05/15 06/26/17 Rx


 


HYDROcodone/APAP 5-325 [[place] 1 each PO Q6HR PRN #20 tablet 02/27/20 Unknown Rx





5/325]    


 


Penicillin Vk [Veetids TAB] 500 mg PO QID #21 tablet 02/27/20 Unknown Rx











                                    Allergies











Allergy/AdvReac Type Severity Reaction Status Date / Time


 


ibuprofen [From Motrin] Allergy  Swelling Verified 11/02/16 13:24


 


lisinopril Allergy  COUGH Verified 11/02/16 13:24


 


metoprolol Allergy  Nausea Verified 11/02/16 13:24


 


Iodinated Contrast Media AdvReac  KIDNEYS Verified 11/02/16 13:24





[Iodinated Contrast Media -   SHUT DOWN  





IV Dye]     














ED Review of Systems


ROS: 


Stated complaint: TOOTHACHE


Other details as noted in HPI





Constitutional: denies: chills, fever


Eyes: denies: eye pain, eye discharge, vision change


ENT: dental pain


Respiratory: denies: cough, shortness of breath, wheezing


Cardiovascular: denies: chest pain, palpitations


Endocrine: no symptoms reported


Gastrointestinal: denies: abdominal pain, nausea, diarrhea


Genitourinary: denies: urgency, dysuria


Musculoskeletal: denies: back pain, joint swelling, arthralgia


Skin: denies: rash, lesions


Neurological: denies: headache, weakness, paresthesias


Psychiatric: denies: anxiety, depression


Hematological/Lymphatic: denies: easy bleeding, easy bruising





ED Past Medical Hx





- Past Medical History


Previous Medical History?: Yes


Hx Hypertension: Yes


Hx CVA: Yes


Hx Heart Attack/AMI: Yes (Echo in 12/2013, EF-20-25%, MI 2016)


Hx Congestive Heart Failure: Yes


Hx Diabetes: No


Hx Renal Disease: Yes


Hx Seizures: No


Hx Kidney Stones: Yes


Hx Asthma: Yes


Hx COPD: Yes


Additional medical history: a fib. ,  Cardiomyopathy 20% ejection fraction, 

atrial clot





- Surgical History


Past Surgical History?: Yes


Hx Coronary Stent: Yes (06/2017)





- Social History


Smoking Status: Current Every Day Smoker


Substance Use Type: None





- Medications


Home Medications: 


                                Home Medications











 Medication  Instructions  Recorded  Confirmed  Last Taken  Type


 


Digoxin [Lanoxin] 0.25 mg PO DAILY@1700 #30 tablet 01/05/15 06/27/17 06/26/17 Rx


 


Aspirin 325 mg PO QDAY 06/27/17 06/27/17 06/26/17 History


 


Furosemide [Lasix TAB] 40 mg PO QDAY 06/27/17 06/27/17 06/26/17 History


 


Losartan [Cozaar] 50 mg PO QDAY 06/27/17 06/27/17 06/26/17 History


 


HYDROcodone/APAP 5-325 [[place] 1 each PO Q6HR PRN #20 tablet 02/27/20  Unknown Rx





5/325]     


 


Penicillin Vk [Veetids TAB] 500 mg PO QID #21 tablet 02/27/20  Unknown Rx














ED Physical Exam





- General


Limitations: No Limitations


General appearance: alert, other (uncomfortable)





- Head


Head exam: Present: atraumatic, normocephalic





- Eye


Eye exam: Present: normal appearance





- ENT


ENT exam: Present: mucous membranes moist, other (pain and dental fracture noted

at tooth #4 with no surrounding erythema or exudate)





- Neck


Neck exam: Present: normal inspection





- Respiratory


Respiratory exam: Present: normal lung sounds bilaterally.  Absent: respiratory 

distress





- Cardiovascular


Cardiovascular Exam: Present: regular rate, normal rhythm.  Absent: systolic 

murmur, diastolic murmur, rubs, gallop





- GI/Abdominal


GI/Abdominal exam: Present: soft, normal bowel sounds





- Rectal


Rectal exam: Present: deferred





- Extremities Exam


Extremities exam: Present: normal inspection





- Back Exam


Back exam: Present: normal inspection





- Neurological Exam


Neurological exam: Present: alert, oriented X3





- Psychiatric


Psychiatric exam: Present: normal affect, normal mood





- Skin


Skin exam: Present: warm, dry, intact, normal color.  Absent: rash





ED Course


                                   Vital Signs











  02/27/20 02/27/20 02/27/20





  04:26 04:58 05:10


 


Temperature 98.5 F  


 


Pulse Rate 95 H 85 


 


Respiratory 18  18





Rate   


 


Blood Pressure 183/119 178/107 


 


O2 Sat by Pulse 95  98





Oximetry   














ED Medical Decision Making





- Medical Decision Making


Patient received tylenol while in the emergency department.  Patient also 

received clonidine therapy for his blood pressure while in emergency department.

 Patient be discharged to follow-up with oral surgery as an outpatient will be 

given Norco and penicillin therapy as an outpatient.





- Differential Diagnosis


Dental Fracture; Dentalgia


Critical care attestation.: 


If time is entered above; I have spent that time in minutes in the direct care 

of this critically ill patient, excluding procedure time.








ED Disposition


Clinical Impression: 


 Pain, dental





Disposition: DC-01 TO HOME OR SELFCARE


Is pt being admited?: No


Condition: Stable


Instructions:  Dental Caries (ED)


Prescriptions: 


HYDROcodone/APAP 5-325 [[place] 5/325] 1 each PO Q6HR PRN #20 tablet


 PRN Reason: Pain


Penicillin Vk [Veetids TAB] 500 mg PO QID #21 tablet


Referrals: 


JULISSA TEJADA DDS [Staff Physician] - 3-5 Days


Time of Disposition: 05:22


Print Language: ENGLISH

## 2020-05-27 ENCOUNTER — HOSPITAL ENCOUNTER (EMERGENCY)
Dept: HOSPITAL 5 - ED | Age: 40
Discharge: HOME | End: 2020-05-27
Payer: MEDICARE

## 2020-05-27 VITALS — DIASTOLIC BLOOD PRESSURE: 97 MMHG | SYSTOLIC BLOOD PRESSURE: 149 MMHG

## 2020-05-27 DIAGNOSIS — L03.116: Primary | ICD-10-CM

## 2020-05-27 DIAGNOSIS — F17.200: ICD-10-CM

## 2020-05-27 DIAGNOSIS — I48.91: ICD-10-CM

## 2020-05-27 DIAGNOSIS — Z87.442: ICD-10-CM

## 2020-05-27 DIAGNOSIS — Z79.899: ICD-10-CM

## 2020-05-27 DIAGNOSIS — Z88.8: ICD-10-CM

## 2020-05-27 DIAGNOSIS — J44.9: ICD-10-CM

## 2020-05-27 DIAGNOSIS — Z86.73: ICD-10-CM

## 2020-05-27 DIAGNOSIS — I11.0: ICD-10-CM

## 2020-05-27 DIAGNOSIS — Z98.890: ICD-10-CM

## 2020-05-27 DIAGNOSIS — Z88.6: ICD-10-CM

## 2020-05-27 DIAGNOSIS — I50.9: ICD-10-CM

## 2020-05-27 LAB
ALBUMIN SERPL-MCNC: 4 G/DL (ref 3.9–5)
ALT SERPL-CCNC: 22 UNITS/L (ref 7–56)
BUN SERPL-MCNC: 18 MG/DL (ref 9–20)
BUN/CREAT SERPL: 18 %
CALCIUM SERPL-MCNC: 9.7 MG/DL (ref 8.4–10.2)
HCT VFR BLD CALC: 54.6 % (ref 35.5–45.6)
HEMOLYSIS INDEX: 30
HGB BLD-MCNC: 18.1 GM/DL (ref 11.8–15.2)
MCHC RBC AUTO-ENTMCNC: 33 % (ref 32–34)
MCV RBC AUTO: 95 FL (ref 84–94)
PLATELET # BLD: 228 K/MM3 (ref 140–440)
RBC # BLD AUTO: 5.76 M/MM3 (ref 3.65–5.03)

## 2020-05-27 PROCEDURE — 99283 EMERGENCY DEPT VISIT LOW MDM: CPT

## 2020-05-27 PROCEDURE — 85027 COMPLETE CBC AUTOMATED: CPT

## 2020-05-27 PROCEDURE — 83880 ASSAY OF NATRIURETIC PEPTIDE: CPT

## 2020-05-27 PROCEDURE — 80053 COMPREHEN METABOLIC PANEL: CPT

## 2020-05-27 PROCEDURE — 36415 COLL VENOUS BLD VENIPUNCTURE: CPT

## 2020-05-27 NOTE — EMERGENCY DEPARTMENT REPORT
ED Extremity Problem HPI





- General


Chief complaint: Extremity Problem,Nontraumatic


Stated complaint: LEFT FOOT SWOLLEN


Source: patient


Mode of arrival: Ambulatory


Limitations: No Limitations





- History of Present Illness


Initial comments: 


Mr Olmos is a 41 y/o aam with hx of htn,DVT,  and chf  on eliquis po for dvt,

pt left foot pain and swllelling for 5 days. pt denies sob, no wheezing, no emery 

, no n/v no chest pain.   left leg pain described as 5/10 on ofs. 





MD Complaint: extremity swelling


Onset/Timin


-: days(s)


Location: lower extremity


History of Same: Yes


-: Yes myalgia, Yes arthralgia


Severity scale (0 -10): 4


Quality: aching


Consistency: intermittent


Improves with: nothing


Worsens with: nothing


Associated Symptoms: denies other symptoms





- Related Data


                                Home Medications











 Medication  Instructions  Recorded  Confirmed  Last Taken


 


Aspirin 325 mg PO QDAY 17


 


Furosemide [Lasix TAB] 40 mg PO QDAY 17


 


Losartan [Cozaar] 50 mg PO QDAY 17








                                  Previous Rx's











 Medication  Instructions  Recorded  Last Taken  Type


 


Digoxin [Lanoxin] 0.25 mg PO DAILY@1700 #30 tablet 01/05/15 06/26/17 Rx


 


HYDROcodone/APAP 5-325 [[place] 1 each PO Q6HR PRN #20 tablet 20 Unknown Rx





5/325]    


 


Penicillin Vk [Veetids TAB] 500 mg PO QID #21 tablet 20 Unknown Rx


 


cephALEXin [Keflex] 500 mg PO Q8HR #30 cap 20 Unknown Rx


 


traMADoL [Ultram] 50 mg PO Q6HR PRN #12 tablet 20 Unknown Rx











                                    Allergies











Allergy/AdvReac Type Severity Reaction Status Date / Time


 


adhesive Allergy  Rash Verified 20 01:51


 


ibuprofen [From Motrin] Allergy  Swelling Verified 16 13:24


 


lisinopril Allergy  COUGH Verified 16 13:24


 


metoprolol Allergy  Nausea Verified 16 13:24


 


amiodarone AdvReac  Nausea Verified 20 01:51


 


Iodinated Contrast Media AdvReac  KIDNEYS Verified 16 13:24





[Iodinated Contrast Media -   SHUT DOWN  





IV Dye]     














ED Review of Systems


ROS: 


Stated complaint: LEFT FOOT SWOLLEN


Other details as noted in HPI





Constitutional: denies: chills, fever


Eyes: denies: eye pain, eye discharge, vision change


ENT: denies: ear pain, throat pain


Respiratory: denies: cough, orthopnea, shortness of breath, SOB with exertion, 

wheezing


Cardiovascular: denies: chest pain, palpitations


Endocrine: no symptoms reported


Gastrointestinal: denies: abdominal pain, nausea, diarrhea


Genitourinary: denies: urgency, dysuria


Musculoskeletal: denies: back pain, joint swelling, arthralgia


Skin: denies: rash, lesions


Neurological: denies: headache, weakness, paresthesias


Psychiatric: denies: anxiety, depression


Hematological/Lymphatic: denies: easy bleeding, easy bruising





ED Past Medical Hx





- Past Medical History


Hx Hypertension: Yes


Hx CVA: Yes


Hx Heart Attack/AMI: Yes (Echo in 2013, EF-20-25%, MI )


Hx Congestive Heart Failure: Yes


Hx Diabetes: No


Hx Renal Disease: Yes


Hx Seizures: No


Hx Kidney Stones: Yes


Hx Asthma: Yes


Hx COPD: Yes


Additional medical history: a fib. ,  Cardiomyopathy 20% ejection fraction, 

atrial clot





- Surgical History


Hx Coronary Stent: Yes (2017)


Additional Surgical History: r/l lithotripsy 2019





- Social History


Smoking Status: Current Every Day Smoker


Substance Use Type: None





- Medications


Home Medications: 


                                Home Medications











 Medication  Instructions  Recorded  Confirmed  Last Taken  Type


 


Digoxin [Lanoxin] 0.25 mg PO DAILY@1700 #30 tablet 01/05/15 06/27/17 06/26/17 Rx


 


Aspirin 325 mg PO QDAY 17 History


 


Furosemide [Lasix TAB] 40 mg PO QDAY 17 History


 


Losartan [Cozaar] 50 mg PO QDAY 17 History


 


HYDROcodone/APAP 5-325 [[place] 1 each PO Q6HR PRN #20 tablet 20  Unknown Rx





5/325]     


 


Penicillin Vk [Veetids TAB] 500 mg PO QID #21 tablet 20  Unknown Rx


 


cephALEXin [Keflex] 500 mg PO Q8HR #30 cap 20  Unknown Rx


 


traMADoL [Ultram] 50 mg PO Q6HR PRN #12 tablet 20  Unknown Rx














ED Physical Exam





- General


Limitations: No Limitations


General appearance: alert, in no apparent distress





- Head


Head exam: Present: atraumatic, normocephalic





- Eye


Eye exam: Present: normal appearance, PERRL, EOMI


Pupils: Present: normal accommodation





- ENT


ENT exam: Present: normal orophraynx, mucous membranes moist, TM's normal 

bilaterally.  Absent: normal external ear exam





- Neck


Neck exam: Present: normal inspection, full ROM.  Absent: lymphadenopathy





- Respiratory


Respiratory exam: Present: normal lung sounds bilaterally.  Absent: respiratory 

distress, wheezes, stridor, chest wall tenderness





- Cardiovascular


Cardiovascular Exam: Present: regular rate, normal rhythm, normal heart sounds. 

 Absent: systolic murmur, diastolic murmur, rubs, gallop





- GI/Abdominal


GI/Abdominal exam: Present: soft, normal bowel sounds.  Absent: distended, 

tenderness, bruit, hernia





- Rectal


Rectal exam: Present: deferred





- Extremities Exam


Extremities exam: Present: full ROM, tenderness, normal capillary refill, pedal 

edema.  Absent: joint swelling, calf tenderness





- Back Exam


Back exam: Present: normal inspection.  Absent: tenderness, CVA tenderness (R), 

CVA tenderness (L)





- Neurological Exam


Neurological exam: Present: alert, CN II-XII intact, normal gait, reflexes 

normal.  Absent: oriented X3, motor sensory deficit





- Psychiatric


Psychiatric exam: Present: normal affect, normal mood





- Skin


Skin exam: Present: warm, dry, intact, normal color.  Absent: rash





ED Course





                                   Vital Signs











  20





  01:51


 


Temperature 98.6 F


 


Pulse Rate 73


 


Respiratory 18





Rate 


 


Blood Pressure 158/109


 


O2 Sat by Pulse 94





Oximetry 














ED Medical Decision Making





- Medical Decision Making


pt decline cxr :denies sob, states not chf exacerbation.  labs: pt declines labs

  , states he was bitten ty outdoor insect , distal pulses are intact +2, rom 

intact, no calf tenderness, neg homans,  plan: will follow up with pcp in 1-2 

days, take medications as prescribed,  pt verbalized agreeemet and understanding

 of discharge plan . 





Critical care attestation.: 


If time is entered above; I have spent that time in minutes in the direct care 

of this critically ill patient, excluding procedure time.








ED Disposition


Clinical Impression: 


 Left leg cellulitis





Disposition: DC- TO HOME OR SELFCARE


Is pt being admited?: No


Does the pt Need Aspirin: No


Condition: Stable


Instructions:  Cellulitis (ED)


Prescriptions: 


cephALEXin [Keflex] 500 mg PO Q8HR #30 cap


traMADoL [Ultram] 50 mg PO Q6HR PRN #12 tablet


 PRN Reason: Pain


Referrals: 


PRIMARY CARE,MD [Primary Care Provider] - 3-5 Days


Forms:  Work/School Release Form(ED)


Time of Disposition: 05:52

## 2022-07-25 ENCOUNTER — HOSPITAL ENCOUNTER (INPATIENT)
Dept: HOSPITAL 5 - ED | Age: 42
LOS: 9 days | Discharge: HOME HEALTH | DRG: 682 | End: 2022-08-03
Attending: STUDENT IN AN ORGANIZED HEALTH CARE EDUCATION/TRAINING PROGRAM | Admitting: INTERNAL MEDICINE
Payer: MEDICARE

## 2022-07-25 DIAGNOSIS — Z79.82: ICD-10-CM

## 2022-07-25 DIAGNOSIS — E87.5: ICD-10-CM

## 2022-07-25 DIAGNOSIS — I69.354: ICD-10-CM

## 2022-07-25 DIAGNOSIS — E44.1: ICD-10-CM

## 2022-07-25 DIAGNOSIS — E87.1: ICD-10-CM

## 2022-07-25 DIAGNOSIS — Z71.3: ICD-10-CM

## 2022-07-25 DIAGNOSIS — Z88.8: ICD-10-CM

## 2022-07-25 DIAGNOSIS — G93.41: ICD-10-CM

## 2022-07-25 DIAGNOSIS — Z82.49: ICD-10-CM

## 2022-07-25 DIAGNOSIS — E11.52: ICD-10-CM

## 2022-07-25 DIAGNOSIS — Z20.822: ICD-10-CM

## 2022-07-25 DIAGNOSIS — Z74.01: ICD-10-CM

## 2022-07-25 DIAGNOSIS — Z71.6: ICD-10-CM

## 2022-07-25 DIAGNOSIS — I96: ICD-10-CM

## 2022-07-25 DIAGNOSIS — I48.21: ICD-10-CM

## 2022-07-25 DIAGNOSIS — Z91.041: ICD-10-CM

## 2022-07-25 DIAGNOSIS — I16.1: ICD-10-CM

## 2022-07-25 DIAGNOSIS — I50.23: ICD-10-CM

## 2022-07-25 DIAGNOSIS — F17.200: ICD-10-CM

## 2022-07-25 DIAGNOSIS — E11.649: ICD-10-CM

## 2022-07-25 DIAGNOSIS — Z98.61: ICD-10-CM

## 2022-07-25 DIAGNOSIS — E66.01: ICD-10-CM

## 2022-07-25 DIAGNOSIS — I47.1: ICD-10-CM

## 2022-07-25 DIAGNOSIS — Z79.899: ICD-10-CM

## 2022-07-25 DIAGNOSIS — I42.8: ICD-10-CM

## 2022-07-25 DIAGNOSIS — N17.0: Primary | ICD-10-CM

## 2022-07-25 DIAGNOSIS — I25.2: ICD-10-CM

## 2022-07-25 DIAGNOSIS — J44.9: ICD-10-CM

## 2022-07-25 LAB
ALBUMIN SERPL-MCNC: 3.1 G/DL (ref 3.9–5)
ALT SERPL-CCNC: 37 UNITS/L (ref 7–56)
BUN SERPL-MCNC: 107 MG/DL (ref 9–20)
BUN SERPL-MCNC: 110 MG/DL (ref 9–20)
BUN/CREAT SERPL: 7 %
BUN/CREAT SERPL: 7 %
CALCIUM SERPL-MCNC: 8.8 MG/DL (ref 8.4–10.2)
CALCIUM SERPL-MCNC: 9.1 MG/DL (ref 8.4–10.2)
HCT VFR BLD CALC: 47 % (ref 35.5–45.6)
HEMOLYSIS INDEX: 5
HEMOLYSIS INDEX: 93
HGB BLD-MCNC: 15.7 GM/DL (ref 11.8–15.2)
MCHC RBC AUTO-ENTMCNC: 34 % (ref 32–34)
MCV RBC AUTO: 90 FL (ref 84–94)
PLATELET # BLD: 306 K/MM3 (ref 140–440)
RBC # BLD AUTO: 5.24 M/MM3 (ref 3.65–5.03)

## 2022-07-25 PROCEDURE — 93306 TTE W/DOPPLER COMPLETE: CPT

## 2022-07-25 PROCEDURE — 82570 ASSAY OF URINE CREATININE: CPT

## 2022-07-25 PROCEDURE — C8929 TTE W OR WO FOL WCON,DOPPLER: HCPCS

## 2022-07-25 PROCEDURE — 93925 LOWER EXTREMITY STUDY: CPT

## 2022-07-25 PROCEDURE — 80053 COMPREHEN METABOLIC PANEL: CPT

## 2022-07-25 PROCEDURE — 83735 ASSAY OF MAGNESIUM: CPT

## 2022-07-25 PROCEDURE — 84156 ASSAY OF PROTEIN URINE: CPT

## 2022-07-25 PROCEDURE — 87040 BLOOD CULTURE FOR BACTERIA: CPT

## 2022-07-25 PROCEDURE — 85007 BL SMEAR W/DIFF WBC COUNT: CPT

## 2022-07-25 PROCEDURE — 71045 X-RAY EXAM CHEST 1 VIEW: CPT

## 2022-07-25 PROCEDURE — 93005 ELECTROCARDIOGRAM TRACING: CPT

## 2022-07-25 PROCEDURE — 84100 ASSAY OF PHOSPHORUS: CPT

## 2022-07-25 PROCEDURE — 84300 ASSAY OF URINE SODIUM: CPT

## 2022-07-25 PROCEDURE — 85027 COMPLETE CBC AUTOMATED: CPT

## 2022-07-25 PROCEDURE — 82962 GLUCOSE BLOOD TEST: CPT

## 2022-07-25 PROCEDURE — 85610 PROTHROMBIN TIME: CPT

## 2022-07-25 PROCEDURE — 80074 ACUTE HEPATITIS PANEL: CPT

## 2022-07-25 PROCEDURE — 85730 THROMBOPLASTIN TIME PARTIAL: CPT

## 2022-07-25 PROCEDURE — 85520 HEPARIN ASSAY: CPT

## 2022-07-25 PROCEDURE — 83935 ASSAY OF URINE OSMOLALITY: CPT

## 2022-07-25 PROCEDURE — 83880 ASSAY OF NATRIURETIC PEPTIDE: CPT

## 2022-07-25 PROCEDURE — 36415 COLL VENOUS BLD VENIPUNCTURE: CPT

## 2022-07-25 PROCEDURE — 85025 COMPLETE CBC W/AUTO DIFF WBC: CPT

## 2022-07-25 PROCEDURE — 99406 BEHAV CHNG SMOKING 3-10 MIN: CPT

## 2022-07-25 PROCEDURE — U0003 INFECTIOUS AGENT DETECTION BY NUCLEIC ACID (DNA OR RNA); SEVERE ACUTE RESPIRATORY SYNDROME CORONAVIRUS 2 (SARS-COV-2) (CORONAVIRUS DISEASE [COVID-19]), AMPLIFIED PROBE TECHNIQUE, MAKING USE OF HIGH THROUGHPUT TECHNOLOGIES AS DESCRIBED BY CMS-2020-01-R: HCPCS

## 2022-07-25 PROCEDURE — 80048 BASIC METABOLIC PNL TOTAL CA: CPT

## 2022-07-25 PROCEDURE — 76770 US EXAM ABDO BACK WALL COMP: CPT

## 2022-07-25 RX ADMIN — Medication SCH ML: at 23:58

## 2022-07-25 RX ADMIN — ONDANSETRON PRN MG: 2 INJECTION INTRAMUSCULAR; INTRAVENOUS at 17:00

## 2022-07-25 RX ADMIN — DEXTROSE MONOHYDRATE PRN ML: 25 INJECTION, SOLUTION INTRAVENOUS at 23:59

## 2022-07-25 RX ADMIN — ONDANSETRON PRN MG: 2 INJECTION INTRAMUSCULAR; INTRAVENOUS at 23:30

## 2022-07-25 RX ADMIN — DEXTROSE SCH MLS/HR: 10 SOLUTION INTRAVENOUS at 15:12

## 2022-07-25 RX ADMIN — HEPARIN SODIUM SCH UNIT: 5000 INJECTION, SOLUTION INTRAVENOUS; SUBCUTANEOUS at 23:58

## 2022-07-25 RX ADMIN — DEXTROSE MONOHYDRATE PRN ML: 25 INJECTION, SOLUTION INTRAVENOUS at 17:15

## 2022-07-25 NOTE — XRAY REPORT
CHEST 1 VIEW 7/25/2022 2:15 PM



INDICATION / CLINICAL INFORMATION: WEAKNESS.



COMPARISON: None available



FINDINGS:



SUPPORT DEVICES: None.

HEART / MEDIASTINUM: There is prominence of the cardiac silhouette. 

LUNGS / PLEURA: There is bilateral venous congestion. No pneumothorax. 



ADDITIONAL FINDINGS: No significant additional findings.



IMPRESSION:

1. There is enlargement of cardiac silhouette. There is bilateral venous congestion.



Signer Name: Kris Garrett MD 

Signed: 7/25/2022 3:21 PM

Workstation Name: [email or web address]

## 2022-07-25 NOTE — EVENT NOTE
Date: 07/25/22





42 years old male with history of diabetes was admitted by Dr. Torrez because of 

potassium of 7.2, glucose 49,  and creatinine 15.7.  Will consult 

nephrology.  We will put the patient up calcium gluconate, D10 and D50 and 

bicarb drip and give Kayexalate 30 g p.o. every 4 hours x3.  Recheck BMP.  

Transfer the patient to the ICU and consulted critical care.  Case discussed 

with Dr. Murphy

## 2022-07-25 NOTE — EMERGENCY DEPARTMENT REPORT
ED General Adult HPI





- General


Chief complaint: Weakness


Stated complaint: NOT ABLE TO URINATE


PUI?: No


Time Seen by Provider: 07/25/22 12:09


Source: EMS


Mode of arrival: Stretcher


Limitations: Physical Limitation





- History of Present Illness


Initial comments: 





42-year-old male with medical history of diabetes with Ozempic and glimepiride 

which according to the patient and mother (caretaker) denies any change of 

diabetes medication.  Patient came to the ER today because of generalized 

weakness and also hypoglycemia  Patient has also unable to urinate for the past 

2 days.  According to patient he has a CVA back in 2021 which resulted in 

complete left-sided weakness and patient is bed ridden.  At the time my 

evaluation patient denies any discomfort.  Patient denies fever shortness with 

dizziness blurred vision lightheadedness headache tinnitus ear pain runny nose 

sore throat loss of taste or smell chest pain palpitation short breath cough 

abdominal pain nausea vomiting diarrhea constipation joint pain muscle pain new 

rash and heat or cold intolerance





- Related Data


                                Home Medications











 Medication  Instructions  Recorded  Confirmed  Last Taken


 


Aspirin 325 mg PO QDAY 06/27/17 06/27/17 06/26/17


 


Furosemide [Lasix TAB] 40 mg PO QDAY 06/27/17 06/27/17 06/26/17


 


Losartan [Cozaar] 50 mg PO QDAY 06/27/17 06/27/17 06/26/17








                                  Previous Rx's











 Medication  Instructions  Recorded  Last Taken  Type


 


Digoxin [Lanoxin] 0.25 mg PO DAILY@1700 #30 tablet 01/05/15 06/26/17 Rx


 


HYDROcodone/APAP 5-325 [[place] 1 each PO Q6HR PRN #20 tablet 02/27/20 Unknown Rx





5/325]    


 


Penicillin Vk [Veetids TAB] 500 mg PO QID #21 tablet 02/27/20 Unknown Rx


 


Acetaminophen [Acetaminophen TAB] 1,000 mg PO Q6HR #30 tablet 05/27/20 Unknown 

Rx


 


cephALEXin [Keflex] 500 mg PO Q8HR #30 cap 05/27/20 Unknown Rx











                                    Allergies











Allergy/AdvReac Type Severity Reaction Status Date / Time


 


adhesive Allergy  Rash Verified 07/25/22 14:07


 


ibuprofen [From Motrin] Allergy  Swelling Verified 07/25/22 14:07


 


lisinopril Allergy  COUGH Verified 07/25/22 14:07


 


metoprolol Allergy  Nausea Verified 07/25/22 14:07


 


amiodarone AdvReac  Nausea Verified 07/25/22 14:07


 


Iodinated Contrast Media AdvReac  KIDNEYS Verified 07/25/22 14:07





[Iodinated Contrast Media -   SHUT DOWN  





IV Dye]     














ED Review of Systems


ROS: 


Stated complaint: NOT ABLE TO URINATE


Other details as noted in HPI








ED Past Medical Hx





- Past Medical History


Hx Hypertension: Yes


Hx CVA: Yes


Hx Heart Attack/AMI: Yes (Echo in 12/2013, EF-20-25%, MI 2016)


Hx Congestive Heart Failure: Yes


Hx Diabetes: No


Hx Renal Disease: Yes


Hx Seizures: No


Hx Kidney Stones: Yes


Hx Asthma: Yes


Hx COPD: Yes


Additional medical history: a fib. ,  Cardiomyopathy 20% ejection fraction, 

atrial clot





- Surgical History


Hx Coronary Stent: Yes (06/2017)


Additional Surgical History: r/l lithotripsy Nov 2019





- Social History


Smoking Status: Current Every Day Smoker


Substance Use Type: None





- Medications


Home Medications: 


                                Home Medications











 Medication  Instructions  Recorded  Confirmed  Last Taken  Type


 


Digoxin [Lanoxin] 0.25 mg PO DAILY@1700 #30 tablet 01/05/15 06/27/17 06/26/17 Rx


 


Aspirin 325 mg PO QDAY 06/27/17 06/27/17 06/26/17 History


 


Furosemide [Lasix TAB] 40 mg PO QDAY 06/27/17 06/27/17 06/26/17 History


 


Losartan [Cozaar] 50 mg PO QDAY 06/27/17 06/27/17 06/26/17 History


 


HYDROcodone/APAP 5-325 [[place] 1 each PO Q6HR PRN #20 tablet 02/27/20  Unknown Rx





5/325]     


 


Penicillin Vk [Veetids TAB] 500 mg PO QID #21 tablet 02/27/20  Unknown Rx


 


Acetaminophen [Acetaminophen TAB] 1,000 mg PO Q6HR #30 tablet 05/27/20  Unknown 

Rx


 


cephALEXin [Keflex] 500 mg PO Q8HR #30 cap 05/27/20  Unknown Rx














ED Physical Exam





- General


Limitations: Physical Limitation





ED Course


                                   Vital Signs











  07/25/22





  11:52


 


Temperature 99.0 F


 


Pulse Rate 120 H


 


Respiratory 16





Rate 


 


Blood Pressure 150/90





[Left] 


 


O2 Sat by Pulse 98





Oximetry 














- Reevaluation(s)


Reevaluation #1: 





07/25/22 13:00


Finger glucose of 28 and patient doesn't have IV access; therefore, Glucagon 1mg

 IM immediately.  Pending nurse to obtain IV access.


07/25/22 14:32


D10 STARTED; NURSE BROUGHT TO MY ATTENTION PATIENT REFUSE STRAIGHT I/O AND WANTS

 TO URINATE ON HIS OWN; I HAVE SPOKE TO THE PATIENT AND STILL REFUSES STRAIGHT 

CATH; PATIENT AWARE HE IS DELAYING MEDICAL WORK UP AND CARE.  


07/25/22 14:46


RECEIVED LAB OF ELEVATED K; INFORMED RN CALCIUM GLUCONATE, EKG, ETC. 


07/25/22 14:56


SPOKE TO BOTH DR. BENAVIDEZ AND DR. ERICKSON (NEPHROLOGIST) WHO ARE AWARE OF PATIENT AND 

APPRECIATE THEIR ASSISTANCE.





ED Medical Decision Making





- Lab Data


Result diagrams: 


                                 07/25/22 13:19





                                 07/25/22 13:19


Critical care attestation.: 


If time is entered above; I have spent that time in minutes in the direct care 

of this critically ill patient, excluding procedure time.








ED Disposition


Clinical Impression: 


 CARLEY (acute kidney injury), Hyperkalemia, Hypoglycemia





Disposition: 09 ADMITTED AS INPATIENT


Is pt being admited?: Yes


Does the pt Need Aspirin: No


Condition: Stable


Referrals: 


PRIMARY CARE,MD [Primary Care Provider] - 3-5 Days


Time of Disposition: 14:57

## 2022-07-26 LAB
ALBUMIN SERPL-MCNC: 2.4 G/DL (ref 3.9–5)
ALT SERPL-CCNC: 33 UNITS/L (ref 7–56)
BAND NEUTROPHILS # (MANUAL): 0 K/MM3
BUN SERPL-MCNC: 111 MG/DL (ref 9–20)
BUN SERPL-MCNC: 113 MG/DL (ref 9–20)
BUN SERPL-MCNC: 113 MG/DL (ref 9–20)
BUN SERPL-MCNC: 79 MG/DL (ref 9–20)
BUN SERPL-MCNC: 83 MG/DL (ref 9–20)
BUN SERPL-MCNC: 96 MG/DL (ref 9–20)
BUN/CREAT SERPL: 6 %
BUN/CREAT SERPL: 7 %
CALCIUM SERPL-MCNC: 7.9 MG/DL (ref 8.4–10.2)
CALCIUM SERPL-MCNC: 7.9 MG/DL (ref 8.4–10.2)
CALCIUM SERPL-MCNC: 8 MG/DL (ref 8.4–10.2)
CALCIUM SERPL-MCNC: 8.8 MG/DL (ref 8.4–10.2)
CALCIUM SERPL-MCNC: 8.9 MG/DL (ref 8.4–10.2)
CALCIUM SERPL-MCNC: 8.9 MG/DL (ref 8.4–10.2)
HCT VFR BLD CALC: 41.1 % (ref 35.5–45.6)
HCT VFR BLD CALC: 41.2 % (ref 35.5–45.6)
HEMOLYSIS INDEX: 12
HEMOLYSIS INDEX: 13
HEMOLYSIS INDEX: 20
HEMOLYSIS INDEX: 27
HEMOLYSIS INDEX: 4
HEMOLYSIS INDEX: 5
HGB BLD-MCNC: 13.5 GM/DL (ref 11.8–15.2)
HGB BLD-MCNC: 13.7 GM/DL (ref 11.8–15.2)
MCHC RBC AUTO-ENTMCNC: 33 % (ref 32–34)
MCHC RBC AUTO-ENTMCNC: 33 % (ref 32–34)
MCV RBC AUTO: 89 FL (ref 84–94)
MCV RBC AUTO: 91 FL (ref 84–94)
MYELOCYTES # (MANUAL): 0 K/MM3
PLATELET # BLD: 262 K/MM3 (ref 140–440)
PLATELET # BLD: 283 K/MM3 (ref 140–440)
PROMYELOCYTES # (MANUAL): 0 K/MM3
RBC # BLD AUTO: 4.51 M/MM3 (ref 3.65–5.03)
RBC # BLD AUTO: 4.6 M/MM3 (ref 3.65–5.03)
TOTAL CELLS COUNTED BLD: 100

## 2022-07-26 PROCEDURE — 5A1D70Z PERFORMANCE OF URINARY FILTRATION, INTERMITTENT, LESS THAN 6 HOURS PER DAY: ICD-10-PCS | Performed by: INTERNAL MEDICINE

## 2022-07-26 PROCEDURE — 02HV33Z INSERTION OF INFUSION DEVICE INTO SUPERIOR VENA CAVA, PERCUTANEOUS APPROACH: ICD-10-PCS | Performed by: STUDENT IN AN ORGANIZED HEALTH CARE EDUCATION/TRAINING PROGRAM

## 2022-07-26 RX ADMIN — Medication SCH ML: at 21:45

## 2022-07-26 RX ADMIN — HYDRALAZINE HYDROCHLORIDE SCH MG: 100 TABLET, FILM COATED ORAL at 16:08

## 2022-07-26 RX ADMIN — DEXTROSE SCH MLS/HR: 10 SOLUTION INTRAVENOUS at 04:28

## 2022-07-26 RX ADMIN — HEPARIN SODIUM SCH UNIT: 5000 INJECTION, SOLUTION INTRAVENOUS; SUBCUTANEOUS at 21:40

## 2022-07-26 RX ADMIN — Medication SCH ML: at 10:17

## 2022-07-26 RX ADMIN — MORPHINE SULFATE PRN MG: 2 INJECTION, SOLUTION INTRAMUSCULAR; INTRAVENOUS at 19:08

## 2022-07-26 RX ADMIN — SODIUM POLYSTYRENE SULFONATE SCH GM: 15 SUSPENSION ORAL; RECTAL at 06:27

## 2022-07-26 RX ADMIN — ASPIRIN SCH MG: 325 TABLET ORAL at 10:17

## 2022-07-26 RX ADMIN — DEXTROSE SCH MLS/HR: 10 SOLUTION INTRAVENOUS at 23:12

## 2022-07-26 RX ADMIN — HYDRALAZINE HYDROCHLORIDE SCH MG: 100 TABLET, FILM COATED ORAL at 21:41

## 2022-07-26 RX ADMIN — DEXTROSE MONOHYDRATE PRN ML: 25 INJECTION, SOLUTION INTRAVENOUS at 03:43

## 2022-07-26 RX ADMIN — SODIUM POLYSTYRENE SULFONATE SCH: 15 SUSPENSION ORAL; RECTAL at 10:05

## 2022-07-26 RX ADMIN — HEPARIN SODIUM SCH UNIT: 5000 INJECTION, SOLUTION INTRAVENOUS; SUBCUTANEOUS at 10:16

## 2022-07-26 RX ADMIN — SODIUM POLYSTYRENE SULFONATE SCH GM: 15 SUSPENSION ORAL; RECTAL at 03:45

## 2022-07-26 RX ADMIN — HYDRALAZINE HYDROCHLORIDE SCH MG: 100 TABLET, FILM COATED ORAL at 06:28

## 2022-07-26 RX ADMIN — ACETAMINOPHEN PRN MG: 325 TABLET ORAL at 16:10

## 2022-07-26 NOTE — PROGRESS NOTE
Assessment and Plan


Assessment and plan: 








This is a 42-year-old male with HTN, right-sided CVA with residual left-sided 

hemiplegia and bedridden's, HFrEF, MI s/p stent placement, DM, asthma, COPD, 

current nicotine abuse, A. fib and noncompliance with A. fib with RVR, acute 

kidney injury with hypoglycemia and severe hypercalcemia





Neuro: Acute metabolic encephalopathy, h/o right-sided CVA with residual left-

sided hemiplegia and bedridden


-Reorientation as needed


-Maintain sleep-wake cycle


-As needed analgesia


-Neurology consulted, appreciate recommendations


   -consulted for guidence re anticougulation per cards request





Cardiac: A. fib, Acute on Chronic Systolic HF, h/o MI s/p stent placement,  

nonischemic cardiomyopathy, chronic A. fib


-Cardiology consulted, appreciate recommendations


-Blood pressure monitoring per protocol


-Echocardiogram shows LVEF of 20 to 25%, severe hypokinesis of septal wall and 

apex


-Digoxin discontinued


-Hydralazine PO rory and PRN IV


-Per cards: No amiodarone or beta-blocker due to documented allergy, Due to low 

EF did not recommend diltiazem at this time, No ACE/ARB due to renal function


-Admit proBNP [id number]





Respiratory: h/o COPD


-Currently on room air


-CCM consulted, appreciate recommendations


-Supplemental oxygen as needed


-SPO2 monitoring


-Pulmonary hygiene





GI: Morbid obesity, mild protein calorie malnutrition


-24 hours +10 mL


-PPI


-Renal cardiac CC diet with supplement


-BR: Colace





: Acute kidney injury, severe hyperkalemia, hyponatremia, metabolic acidosis


-Nephrology consulted, appreciate recommendations


-HD catheter placed today on 7/26


-HD per nephrology


-Strict intake and output


   -Bladder scan possible placement of Zamarripa catheter


-Renally dose medications


-Avoid nephrotoxic medications


-Urine lites pending


-Renal ultrasound pending


-Trend BMP





ID: SIRS, r/o sepsis


-Tmax 101.5, tachycardia, leukocystosis, CARLEY


-UA pending


-BC x 2


-cxr shows venous congestion


-WODN consulted


-f/u blood culture


-Monitor WBC and temperature curve





Endo: Hypoglycemia,  h/o DM


-Avoid hypoglycemia


-D10 gtt


-Accu-Cheks q. 1





Heme: Leukocytosis


-Trend CBC


-Transfuse hemoglobin less than 7


-SCDs to BLE while in bed











The high probability of a clinically significant, sudden or life threatening det

erioration of the [multi] system(s) required my full and direct attention, 

intervention and personal management. The aggregate critical care time was [60] 

minutes. This time is in addition to time spent performing reported procedures 

but includes the following: 





[x] Data Review and interpretation 





[x] Patient assessment and monitoring of vital signs 





[x] Documentation 





[x] Medication orders and management





Disposition Plan: icu


Total Time Spent with Patient (Minutes): 60





History


Interval history: 


This is a 42-year-old male with HTN, right CVA (1/2021) with residual left-sided

 hemiplegia and bedridden, heart failure with reduced EF (EF 20 to 25% in 2013),

 MI in 2016, s/p stent placement in 2017, DM, COPD,, current nicotine abuse, A. 

fib and noncompliance who presented to the emergency department on 7/25 via EMS 

for generalized weakness and low blood glucose.  Per EMS patient's blood glucose

 levels were in the 40s and he complained of inability to urinate for 2 days and

 shortness of breath.  Patient was admitted to [facility] in January 2021 

and is status BUN/creatinine on 5/27/2020 was noted to be 18/1.0.  Patient 

presented to emergency department with fever of 99, , /90, lab work 

showed leukocytosis, hyponatremia at 128, hyperkalemia at 7.2, metabolic 

acidosis of 17, elevated BUN/creatinine at 107/15.7 and hyperglycemia 54.  

Patient was medically treated with calcium gluconate and bicarbonate, received 2

 L normal saline bolus and it was noted that patient refused Zamarripa catheter 

placement.  Nephrology was consulted in the emergency department.  Patient was 

admitted to the hospitalist service with hypertensive emergency, hypoglycemia, 

hyperkalemia, acute kidney injury, A. fib RVR and CHF with consults to [facility] and 

cardiology.





Hospital course to date:





7/26: Vas-Cath placed for emergent hemodialysis for which she received.  RN 

asked to BladderScan the patient for possible placement of Zamarripa catheter.  

Renal ultrasound pending.  Sodium bicarbonate drip was discontinued due to 

bicarbonate improvement into the 20s and D10 was decreased to 75.  Digoxin 

discontinued.  Neurology consulted for guidance for anticoagulation as patient w

as initially not anticoagulated for risk of hemorrhagic conversion of CVA.








Hospitalist Physical





- Constitutional


Vitals: 


                                        











Temp Pulse Resp BP Pulse Ox


 


 99.2 F   127 H  18   127/79   98 


 


 07/26/22 16:32  07/26/22 17:21  07/26/22 17:21  07/26/22 17:21  07/26/22 17:21











General appearance: Present: no acute distress





- EENT


Eyes: Present: PERRL, EOM intact


ENT: hearing intact, poor dentition





- Neck


Neck: Present: normal ROM





- Respiratory


Respiratory effort: normal


Respiratory: bilateral: diminished





- Cardiovascular


Rhythm: regular


Heart Sounds: Present: S1 & S2.  Absent: systolic murmur, diastolic murmur





- Extremities


Extremities: no ischemia, pulses intact, pulses symmetrical, normal temperature,

 normal color


Extremity abnormal: edema


Peripheral Pulses: within normal limits





- Abdominal


General gastrointestinal: soft, non-tender, normal bowel sounds





- Integumentary


Integumentary: Present: warm, dry





- Psychiatric


Psychiatric: cooperative





- Neurologic


Neurologic: CNII-XII intact, no focal deficits, moves all extremities





- Allied Health


Allied health notes reviewed: nursing, RT, social work





HEART Score





- HEART Score


Age: 45-65


Risk factors: > 3 risk factors or hx of atherosclerotic disease


Troponin: 1-3x normal limit





- Critical Actions


Critical Actions: 4-6 pts:12-16.6% risk of adverse cardiac event. Should be admi

tted





Results





- Labs


CBC & Chem 7: 


                                 07/26/22 04:16





                                07/26/22 Unknown


Labs: 


                             Laboratory Last Values











WBC  16.0 K/mm3 (4.5-11.0)  H  07/26/22  04:16    


 


RBC  4.51 M/mm3 (3.65-5.03)   07/26/22  04:16    


 


Hgb  13.5 gm/dl (11.8-15.2)   07/26/22  04:16    


 


Hct  41.1 % (35.5-45.6)   07/26/22  04:16    


 


MCV  91 fl (84-94)   07/26/22  04:16    


 


MCH  30 pg (28-32)   07/26/22  04:16    


 


MCHC  33 % (32-34)   07/26/22  04:16    


 


RDW  14.3 % (13.2-15.2)   07/26/22  04:16    


 


Plt Count  283 K/mm3 (140-440)   07/26/22  04:16    


 


Add Manual Diff  Complete   07/26/22  04:16    


 


Total Counted  100   07/26/22  04:16    


 


Seg Neuts % (Manual)  78.0 % (40.0-70.0)  H  07/26/22  04:16    


 


Band Neutrophils %  0 %  07/26/22  04:16    


 


Lymphocytes % (Manual)  11.0 % (13.4-35.0)  L  07/26/22  04:16    


 


Reactive Lymphs % (Man)  0 %  07/26/22  04:16    


 


Monocytes % (Manual)  11.0 % (0.0-7.3)  H  07/26/22  04:16    


 


Eosinophils % (Manual)  0 % (0.0-4.3)   07/26/22  04:16    


 


Basophils % (Manual)  0 % (0.0-1.8)   07/26/22  04:16    


 


Metamyelocytes %  0 %  07/26/22  04:16    


 


Myelocytes %  0 %  07/26/22  04:16    


 


Promyelocytes %  0 %  07/26/22  04:16    


 


Blast Cells %  0 %  07/26/22  04:16    


 


Nucleated RBC %  Not Reportable   07/26/22  04:16    


 


Seg Neutrophils # Man  12.5 K/mm3 (1.8-7.7)  H  07/26/22  04:16    


 


Band Neutrophils #  0.0 K/mm3  07/26/22  04:16    


 


Lymphocytes # (Manual)  1.8 K/mm3 (1.2-5.4)   07/26/22  04:16    


 


Abs React Lymphs (Man)  0.0 K/mm3  07/26/22  04:16    


 


Monocytes # (Manual)  1.8 K/mm3 (0.0-0.8)  H  07/26/22  04:16    


 


Eosinophils # (Manual)  0.0 K/mm3 (0.0-0.4)   07/26/22  04:16    


 


Basophils # (Manual)  0.0 K/mm3 (0.0-0.1)   07/26/22  04:16    


 


Metamyelocytes #  0.0 K/mm3  07/26/22  04:16    


 


Myelocytes #  0.0 K/mm3  07/26/22  04:16    


 


Promyelocytes #  0.0 K/mm3  07/26/22  04:16    


 


Blast Cells #  0.0 K/mm3  07/26/22  04:16    


 


WBC Morphology  Not Reportable   07/26/22  04:16    


 


Hypersegmented Neuts  Not Reportable   07/26/22  04:16    


 


Hyposegmented Neuts  Not Reportable   07/26/22  04:16    


 


Hypogranular Neuts  Not Reportable   07/26/22  04:16    


 


Smudge Cells  Not Reportable   07/26/22  04:16    


 


Toxic Granulation  Not Reportable   07/26/22  04:16    


 


Toxic Vacuolation  Not Reportable   07/26/22  04:16    


 


Dohle Bodies  Not Reportable   07/26/22  04:16    


 


Pelger-Huet Anomaly  Not Reportable   07/26/22  04:16    


 


Cb Rods  Not Reportable   07/26/22  04:16    


 


Platelet Estimate  Consistent w auto   07/26/22  04:16    


 


Clumped Platelets  Not Reportable   07/26/22  04:16    


 


Plt Clumps, EDTA  Not Reportable   07/26/22  04:16    


 


Large Platelets  Not Reportable   07/26/22  04:16    


 


Giant Platelets  Not Reportable   07/26/22  04:16    


 


Platelet Satelliting  Not Reportable   07/26/22  04:16    


 


Plt Morphology Comment  Not Reportable   07/26/22  04:16    


 


RBC Morphology  Not Reportable   07/26/22  04:16    


 


Dimorphic RBCs  Not Reportable   07/26/22  04:16    


 


Polychromasia  Not Reportable   07/26/22  04:16    


 


Hypochromasia  Not Reportable   07/26/22  04:16    


 


Poikilocytosis  Not Reportable   07/26/22  04:16    


 


Anisocytosis  Not Reportable   07/26/22  04:16    


 


Microcytosis  Not Reportable   07/26/22  04:16    


 


Macrocytosis  Not Reportable   07/26/22  04:16    


 


Spherocytes  Not Reportable   07/26/22  04:16    


 


Pappenheimer Bodies  Not Reportable   07/26/22  04:16    


 


Sickle Cells  Not Reportable   07/26/22  04:16    


 


Target Cells  Not Reportable   07/26/22  04:16    


 


Tear Drop Cells  Not Reportable   07/26/22  04:16    


 


Ovalocytes  Not Reportable   07/26/22  04:16    


 


Helmet Cells  Not Reportable   07/26/22  04:16    


 


Ge-Kendrick Bodies  Not Reportable   07/26/22  04:16    


 


Cabot Rings  Not Reportable   07/26/22  04:16    


 


[place] Cells  Not Reportable   07/26/22  04:16    


 


Bite Cells  Not Reportable   07/26/22  04:16    


 


Crenated Cell  Not Reportable   07/26/22  04:16    


 


Elliptocytes  Not Reportable   07/26/22  04:16    


 


Acanthocytes (Spur)  Not Reportable   07/26/22  04:16    


 


Rouleaux  Not Reportable   07/26/22  04:16    


 


Hemoglobin C Crystals  Not Reportable   07/26/22  04:16    


 


Schistocytes  Not Reportable   07/26/22  04:16    


 


Malaria parasites  Not Reportable   07/26/22  04:16    


 


Justin Bodies  Not Reportable   07/26/22  04:16    


 


Hem Pathologist Commnt  No   07/26/22  04:16    


 


Sodium  129 mmol/L (137-145)  L  07/26/22  Unknown


 


Potassium  7.5 mmol/L (3.6-5.0)  H* D 07/26/22  Unknown


 


Chloride  91.8 mmol/L ()  L  07/26/22  Unknown


 


Carbon Dioxide  13 mmol/L (22-30)  L D 07/26/22  Unknown


 


Anion Gap  32 mmol/L  07/26/22  Unknown


 


BUN  113 mg/dL (9-20)  H  07/26/22  Unknown


 


Creatinine  16.1 mg/dL (0.8-1.3)  H  07/26/22  Unknown


 


Estimated GFR  4 ml/min  07/26/22  Unknown


 


BUN/Creatinine Ratio  7 %  07/26/22  Unknown


 


Glucose  83 mg/dL ()   07/26/22  Unknown


 


POC Glucose  84 mg/dL ()   07/26/22  16:08    


 


Calcium  8.9 mg/dL (8.4-10.2)   07/26/22  Unknown


 


Magnesium  2.00 mg/dL (1.7-2.3)   07/25/22  13:19    


 


Total Bilirubin  0.80 mg/dL (0.1-1.2)   07/26/22  04:16    


 


AST  45 units/L (5-40)  H  07/26/22  04:16    


 


ALT  33 units/L (7-56)   07/26/22  04:16    


 


Alkaline Phosphatase  131 units/L ()  H  07/26/22  04:16    


 


NT-Pro-B Natriuret Pep  30758 pg/mL (0-450)  H  07/26/22  04:16    


 


Total Protein  7.2 g/dL (6.3-8.2)   07/26/22  04:16    


 


Albumin  2.4 g/dL (3.9-5)  L  07/26/22  04:16    


 


Albumin/Globulin Ratio  0.5 %  07/26/22  04:16    


 


Hepatitis A IgM Ab  Non-reactive  (NonReactive)   07/26/22  11:10    


 


Hep Bs Antigen  Non-reactive  (Negative)   07/26/22  11:10    


 


Hep B Core IgM Ab  Non-reactive  (NonReactive)   07/26/22  11:10    


 


Hepatitis C Antibody  Non-reactive  (NonReactive)   07/26/22  11:10    














Active Medications





- Current Medications


Current Medications: 














Generic Name Dose Route Start Last Admin





  Trade Name Freq  PRN Reason Stop Dose Admin


 


Acetaminophen  650 mg  07/25/22 16:54  07/26/22 16:10





  Acetaminophen 325 Mg Tab  PO   650 mg





  Q4H PRN   Administration





  Pain MILD(1-3)/Fever >100.5/HA  


 


Amlodipine Besylate  10 mg  07/25/22 20:00  07/26/22 10:18





  Amlodipine 10 Mg Tab  PO   10 mg





  QDAY RORY   Administration


 


Aspirin  325 mg  07/26/22 10:00  07/26/22 10:17





  Aspirin 325 Mg Tab  PO   325 mg





  QDAY RORY   Administration


 


Dextrose  50 ml  07/26/22 10:45 





  Dextrose 50% In Water (25gm) 50 Ml Syringe  IV  





  Q30MIN PRN  





  Hypoglycemia  





  Protocol  


 


Glucagon  1 mg  07/25/22 15:39  07/25/22 16:08





  Glucagon (Human Recombinant) 1 Mg/Ml Inj  IV   1 mg





  Q1H PRN   Administration





  Hypoglycemia  


 


Heparin Sodium (Porcine)  5,000 unit  07/25/22 22:00  07/26/22 10:16





  Heparin 5,000 Unit/1 Ml Vial  SUB-Q   5,000 unit





  Q12HR RORY   Administration


 


Hydralazine HCl  10 mg  07/25/22 18:41 





  Hydralazine 20 Mg/1 Ml Inj  IV  





  Q2H PRN  





  Blood Pressure  


 


Hydralazine HCl  100 mg  07/25/22 22:00  07/26/22 16:08





  Hydralazine 100 Mg Tab  PO   100 mg





  Q8HR RORY   Administration


 


Sodium Chloride  100 mls @ 999 mls/hr  07/26/22 07:08 





  Nacl 0.9%  IV  





  CASSIDY PRN  





  Hypotension  


 


Dextrose  1,000 mls @ 75 mls/hr  07/26/22 14:00 





  D10w  IV  





  AS DIRECT RORY  


 


Morphine Sulfate  2 mg  07/25/22 16:54 





  Morphine 2 Mg/1 Ml Inj  IV  





  Q4H PRN  





  Pain, Moderate (4-6)  


 


Ondansetron HCl  4 mg  07/25/22 16:54  07/25/22 23:30





  Ondansetron 4 Mg/2 Ml Inj  IV   4 mg





  Q8H PRN   Administration





  Nausea And Vomiting  


 


Oxycodone/Acetaminophen  1 tab  07/25/22 16:54 





  Oxycodone /Acetaminophen 5-325mg Tab  PO  





  Q6H PRN  





  Pain, Moderate (4-6)  


 


Sodium Chloride  10 ml  07/25/22 22:00  07/26/22 10:17





  Sodium Chloride 0.9% 10 Ml Flush Syringe  IV   10 ml





  BID RORY   Administration


 


Sodium Chloride  10 ml  07/25/22 16:54 





  Sodium Chloride 0.9% 10 Ml Flush Syringe  IV  





  PRN PRN  





  LINE FLUSH

## 2022-07-26 NOTE — CONSULTATION
History of Present Illness





- Reason for Consult


Consult date: 07/26/22


Hypoglycemia, Hyperkalemia


Requesting physician: JARROD MCKENNA





- History of Present Illness





Called around 11:30pm last night first, by ICU nurse that this patient was 

deemed for step down with a K of 7.2 and on a D10 drip.  I was not consulted as 

it was not an ICU patient.  about 15 mins later, called by Dr. Mckenna for 

admission and consult to the ICU secondary to hypoglycemia and hyperkalemia.  

Patient was first evaluated around 1300 yesterday for generalized weakness.  

Labs were back around 1400.  This am, vascath placed and now patient is 

currently undergoing HD.





Past History


Past Medical History: atrial fib, diabetes (on oral medications), heart failure,

hypertension, renal failure, stroke


Past Surgical History: PTCA


Social history: denies: smoking, alcohol abuse, prescription drug abuse, IV drug

use


Family history: hypertension





Medications and Allergies


                                    Allergies











Allergy/AdvReac Type Severity Reaction Status Date / Time


 


adhesive Allergy  Rash Verified 07/25/22 14:07


 


ibuprofen [From Motrin] Allergy  Swelling Verified 07/25/22 14:07


 


lisinopril Allergy  COUGH Verified 07/25/22 14:07


 


metoprolol Allergy  Nausea Verified 07/25/22 14:07


 


amiodarone AdvReac  Nausea Verified 07/25/22 14:07


 


Iodinated Contrast Media AdvReac  KIDNEYS Verified 07/25/22 14:07





[Iodinated Contrast Media -   SHUT DOWN  





IV Dye]     











                                Home Medications











 Medication  Instructions  Recorded  Confirmed  Last Taken  Type


 


Digoxin [Lanoxin] 0.25 mg PO DAILY@1700 #30 tablet 01/05/15 06/27/17 06/26/17 Rx


 


Aspirin 325 mg PO QDAY 06/27/17 06/27/17 06/26/17 History


 


Furosemide [Lasix TAB] 40 mg PO QDAY 06/27/17 06/27/17 06/26/17 History


 


Losartan [Cozaar] 50 mg PO QDAY 06/27/17 06/27/17 06/26/17 History


 


HYDROcodone/APAP 5-325 [[place] 1 each PO Q6HR PRN #20 tablet 02/27/20  Unknown Rx





5/325]     


 


Penicillin Vk [Veetids TAB] 500 mg PO QID #21 tablet 02/27/20  Unknown Rx


 


Acetaminophen [Acetaminophen TAB] 1,000 mg PO Q6HR #30 tablet 05/27/20  Unknown 

Rx


 


cephALEXin [Keflex] 500 mg PO Q8HR #30 cap 05/27/20  Unknown Rx











Active Meds: 


Active Medications





Acetaminophen (Acetaminophen 325 Mg Tab)  650 mg PO Q4H PRN


   PRN Reason: Pain MILD(1-3)/Fever >100.5/HA


Amlodipine Besylate (Amlodipine 10 Mg Tab)  10 mg PO QDAY [facility]


   Last Admin: 07/25/22 19:40 Dose:  10 mg


   


Aspirin (Aspirin 325 Mg Tab)  325 mg PO QDAY [facility]


Dextrose (Dextrose 50% In Water (25gm) 50 Ml Syringe)  50 ml IV Q30MIN PRN; 

Protocol


   PRN Reason: Hypoglycemia


Glucagon (Glucagon (Human Recombinant) 1 Mg/Ml Inj)  1 mg IV Q1H PRN


   PRN Reason: Hypoglycemia


   Last Admin: 07/25/22 16:08 Dose:  1 mg


   


Heparin Sodium (Porcine) (Heparin 5,000 Unit/1 Ml Vial)  5,000 unit SUB-Q Q12HR 

[facility]


   Last Admin: 07/25/22 23:58 Dose:  5,000 unit


   


Hydralazine HCl (Hydralazine 20 Mg/1 Ml Inj)  10 mg IV Q2H PRN


   PRN Reason: Blood Pressure


Hydralazine HCl (Hydralazine 100 Mg Tab)  100 mg PO Q8HR [facility]


   Last Admin: 07/26/22 06:28 Dose:  100 mg


   


CALC GLUCONATE 1GM/ ML (Calcium Gluonate/Ns 1,000mg/100ml)  1 gm in 100 

mls @ 200 mls/hr IV Q30MIN [facility]


   Stop: 07/26/22 16:29


   Last Admin: 07/25/22 18:04 Dose:  200 mls/hr


   


Dextrose (D10w)  1,000 mls @ 100 mls/hr IV AS DIRECT [facility]


Sodium Bicarbonate 150 meq/ (Dextrose)  1,150 mls @ 100 mls/hr IV AS DIRECT [facility]


   Last Admin: 07/26/22 01:10 Dose:  100 mls/hr


   


Sodium Chloride (Nacl 0.9%)  100 mls @ 999 mls/hr IV CASSIDY PRN


   PRN Reason: Hypotension


Morphine Sulfate (Morphine 2 Mg/1 Ml Inj)  2 mg IV Q4H PRN


   PRN Reason: Pain, Moderate (4-6)


Ondansetron HCl (Ondansetron 4 Mg/2 Ml Inj)  4 mg IV Q8H PRN


   PRN Reason: Nausea And Vomiting


   Last Admin: 07/25/22 23:30 Dose:  4 mg


   


Oxycodone/Acetaminophen (Oxycodone /Acetaminophen 5-325mg Tab)  1 tab PO Q6H PRN


   PRN Reason: Pain, Moderate (4-6)


Sodium Chloride (Sodium Chloride 0.9% 10 Ml Flush Syringe)  10 ml IV BID [facility]


   Last Admin: 07/25/22 23:58 Dose:  10 ml


   


Sodium Chloride (Sodium Chloride 0.9% 10 Ml Flush Syringe)  10 ml IV PRN PRN


   PRN Reason: LINE FLUSH











Review of Systems


All systems: negative


Genitourinary Male: urinary retention





Exam





- Constitutional


Vitals: 


                                        











Temp Pulse Resp BP Pulse Ox


 


 97.3 F L  111 H  20   125/88   93 


 


 07/26/22 10:15  07/26/22 10:45  07/26/22 10:15  07/26/22 10:45  07/26/22 07:31











General appearance: Present: no acute distress, disheveled





- EENT


Eyes: Present: PERRL


ENT: hearing intact





- Neck


Neck: Present: supple, normal ROM





- Respiratory


Respiratory effort: normal


Respiratory: bilateral: diminished





- Cardiovascular


Rhythm: other (tachy)





- Extremities


Extremities: no ischemia





- Abdominal


General gastrointestinal: Present: soft, non-tender


Male genitourinary: Present: deferred





- Rectal


Rectal Exam: deferred





- Integumentary


Integumentary: Present: warm, dry





Results





- Labs


CBC & Chem 7: 


                                 07/26/22 04:16





                                 07/26/22 04:16


Labs: 


                              Abnormal lab results











  07/25/22 07/25/22 07/25/22 Range/Units





  12:30 12:46 13:19 


 


WBC    15.3 H  (4.5-11.0)  K/mm3


 


RBC    5.24 H  (3.65-5.03)  M/mm3


 


Hgb    15.7 H  (11.8-15.2)  gm/dl


 


Hct    47.0 H  (35.5-45.6)  %


 


Seg Neuts % (Manual)     (40.0-70.0)  %


 


Lymphocytes % (Manual)     (13.4-35.0)  %


 


Monocytes % (Manual)     (0.0-7.3)  %


 


Seg Neutrophils # Man     (1.8-7.7)  K/mm3


 


Monocytes # (Manual)     (0.0-0.8)  K/mm3


 


Sodium     (137-145)  mmol/L


 


Potassium     (3.6-5.0)  mmol/L


 


Chloride     ()  mmol/L


 


Carbon Dioxide     (22-30)  mmol/L


 


BUN     (9-20)  mg/dL


 


Creatinine     (0.8-1.3)  mg/dL


 


Glucose     ()  mg/dL


 


POC Glucose  28 L  44 L   ()  mg/dL


 


AST     (5-40)  units/L


 


Alkaline Phosphatase     ()  units/L


 


Albumin     (3.9-5)  g/dL














  07/25/22 07/25/22 07/25/22 Range/Units





  13:19 13:33 14:01 


 


WBC     (4.5-11.0)  K/mm3


 


RBC     (3.65-5.03)  M/mm3


 


Hgb     (11.8-15.2)  gm/dl


 


Hct     (35.5-45.6)  %


 


Seg Neuts % (Manual)     (40.0-70.0)  %


 


Lymphocytes % (Manual)     (13.4-35.0)  %


 


Monocytes % (Manual)     (0.0-7.3)  %


 


Seg Neutrophils # Man     (1.8-7.7)  K/mm3


 


Monocytes # (Manual)     (0.0-0.8)  K/mm3


 


Sodium  128 L    (137-145)  mmol/L


 


Potassium  7.2 H*    (3.6-5.0)  mmol/L


 


Chloride  93.1 L    ()  mmol/L


 


Carbon Dioxide  17 L    (22-30)  mmol/L


 


BUN  107 H    (9-20)  mg/dL


 


Creatinine  15.7 H    (0.8-1.3)  mg/dL


 


Glucose  54 L    ()  mg/dL


 


POC Glucose   67 L  57 L  ()  mg/dL


 


AST  61 H    (5-40)  units/L


 


Alkaline Phosphatase  138 H    ()  units/L


 


Albumin  3.1 L    (3.9-5)  g/dL














  07/25/22 07/25/22 07/25/22 Range/Units





  14:57 15:31 16:58 


 


WBC     (4.5-11.0)  K/mm3


 


RBC     (3.65-5.03)  M/mm3


 


Hgb     (11.8-15.2)  gm/dl


 


Hct     (35.5-45.6)  %


 


Seg Neuts % (Manual)     (40.0-70.0)  %


 


Lymphocytes % (Manual)     (13.4-35.0)  %


 


Monocytes % (Manual)     (0.0-7.3)  %


 


Seg Neutrophils # Man     (1.8-7.7)  K/mm3


 


Monocytes # (Manual)     (0.0-0.8)  K/mm3


 


Sodium     (137-145)  mmol/L


 


Potassium     (3.6-5.0)  mmol/L


 


Chloride     ()  mmol/L


 


Carbon Dioxide     (22-30)  mmol/L


 


BUN     (9-20)  mg/dL


 


Creatinine     (0.8-1.3)  mg/dL


 


Glucose     ()  mg/dL


 


POC Glucose  47 L  52 L  49 L  ()  mg/dL


 


AST     (5-40)  units/L


 


Alkaline Phosphatase     ()  units/L


 


Albumin     (3.9-5)  g/dL














  07/25/22 07/25/22 07/26/22 Range/Units





  22:33 23:53 00:56 


 


WBC     (4.5-11.0)  K/mm3


 


RBC     (3.65-5.03)  M/mm3


 


Hgb     (11.8-15.2)  gm/dl


 


Hct     (35.5-45.6)  %


 


Seg Neuts % (Manual)     (40.0-70.0)  %


 


Lymphocytes % (Manual)     (13.4-35.0)  %


 


Monocytes % (Manual)     (0.0-7.3)  %


 


Seg Neutrophils # Man     (1.8-7.7)  K/mm3


 


Monocytes # (Manual)     (0.0-0.8)  K/mm3


 


Sodium  130 L   130 L  (137-145)  mmol/L


 


Potassium  7.8 H*   7.2 H*  (3.6-5.0)  mmol/L


 


Chloride  92.6 L   92.2 L  ()  mmol/L


 


Carbon Dioxide  17 L   17 L  (22-30)  mmol/L


 


BUN  110 H   111 H  (9-20)  mg/dL


 


Creatinine  16.5 H   16.0 H  (0.8-1.3)  mg/dL


 


Glucose  49 L   103 H  ()  mg/dL


 


POC Glucose   49 L   ()  mg/dL


 


AST     (5-40)  units/L


 


Alkaline Phosphatase     ()  units/L


 


Albumin     (3.9-5)  g/dL














  07/26/22 07/26/22 07/26/22 Range/Units





  03:32 04:13 04:16 


 


WBC    16.0 H  (4.5-11.0)  K/mm3


 


RBC     (3.65-5.03)  M/mm3


 


Hgb     (11.8-15.2)  gm/dl


 


Hct     (35.5-45.6)  %


 


Seg Neuts % (Manual)    78.0 H  (40.0-70.0)  %


 


Lymphocytes % (Manual)    11.0 L  (13.4-35.0)  %


 


Monocytes % (Manual)    11.0 H  (0.0-7.3)  %


 


Seg Neutrophils # Man    12.5 H  (1.8-7.7)  K/mm3


 


Monocytes # (Manual)    1.8 H  (0.0-0.8)  K/mm3


 


Sodium     (137-145)  mmol/L


 


Potassium     (3.6-5.0)  mmol/L


 


Chloride     ()  mmol/L


 


Carbon Dioxide     (22-30)  mmol/L


 


BUN     (9-20)  mg/dL


 


Creatinine     (0.8-1.3)  mg/dL


 


Glucose     ()  mg/dL


 


POC Glucose  62 L  113 H   ()  mg/dL


 


AST     (5-40)  units/L


 


Alkaline Phosphatase     ()  units/L


 


Albumin     (3.9-5)  g/dL














  07/26/22 07/26/22 Range/Units





  04:16 05:30 


 


WBC    (4.5-11.0)  K/mm3


 


RBC    (3.65-5.03)  M/mm3


 


Hgb    (11.8-15.2)  gm/dl


 


Hct    (35.5-45.6)  %


 


Seg Neuts % (Manual)    (40.0-70.0)  %


 


Lymphocytes % (Manual)    (13.4-35.0)  %


 


Monocytes % (Manual)    (0.0-7.3)  %


 


Seg Neutrophils # Man    (1.8-7.7)  K/mm3


 


Monocytes # (Manual)    (0.0-0.8)  K/mm3


 


Sodium  127 L   (137-145)  mmol/L


 


Potassium  7.2 H*   (3.6-5.0)  mmol/L


 


Chloride  92.6 L   ()  mmol/L


 


Carbon Dioxide  14 L   (22-30)  mmol/L


 


BUN  113 H   (9-20)  mg/dL


 


Creatinine  16.1 H   (0.8-1.3)  mg/dL


 


Glucose    ()  mg/dL


 


POC Glucose   112 H  ()  mg/dL


 


AST  45 H   (5-40)  units/L


 


Alkaline Phosphatase  131 H   ()  units/L


 


Albumin  2.4 L   (3.9-5)  g/dL














- Imaging and Cardiology


Chest x-ray: image reviewed (Cardiomegaly with venous congestion)





Assessment and Plan





43 y/o male with hyperkalemia, secondary to acute renal failure of unknown 

etiology with hypoglycemia likely secondary to oral medications not fully being 

cleared secondary to renal impairment.





1.  Renal-nephro already ordered HD and patient is on it.  Unsure if bladder 

scan was done but patient has no Joyce as he refused it in the ED.  Still would 

consider bladder scan and will discuss placement of joyce based on scan results.

  HD per renal.  Likely needs renal ultrasound as well.





2.  Endocrine-persistent hypoglycemia requiring D10 drip.  Once patient has 3 

consecutive sugars greater than 180 then would be ok with stopping D10.  Hopeful

 HD will help to remove active metabolites of drugs that maybe adding to continu

ed hypoglycemia.  If not improvement post HD, will need further work up.  Ok 

with feeding patient as long as he remains alert and can protect his airway.  

Continue q1 hour finger sticks





3.  Cardiovascular-tachycardic.  Questionable afib.  Awaiting 12 lead EKG.  Hold

 on therapy for right now.  Cards is following as well.  Stopped Dig given renal

 function.





4.  GI- ok with feeding patient, needs prophylaxis for ulcers.





5.  Neuro-some confusion on questioning but for the most part stable, will co

ntinue to monitor





6.  Guarded prognosis.





CCt 31 minutes.

## 2022-07-26 NOTE — CONSULTATION
History of Present Illness





- Reason for Consult


Consult date: 07/26/22


end stage renal disease


Requesting physician: CAMMIE MA





- History of Present Illness





This is a 41 yo AAM with history of hypertension and right CVA January 2021 with

left side hemiplegia and bedridden and noncompliant who presents with 

generalized weakness and low blood glucose level. patient is brought in by EMS 

who reports that blood glucose levels were in the 40s. Patient states that he ha

d decreased urine output for the last 2 days. Patient has been bedridden for the

last 1 and half years since January 2021.  Patient has a history of cardiac 

stent in 2017 and cardiomyopathy with ejection fraction of 20% and atrial 

fibrillation but not on any anticoagulation. Patient was admitted to [facility] in January 2021.  His baseline Creatinine was 18/1.0 on 5/27/2020. 

Patient has not been following with his cardiologist or nephrologist primary 

care physician. No chest pain.  No fever or chills.  Nausea present.  No 

vomiting. Labs in ER showed elevated BUN/Cr at 113/16mg/dl with elevated K > 7 

and low Na at 127. renal consult is requested for management of Acute renal 

failure, hyperkalemia. 








Past History


Past Medical History: atrial fib, heart failure, hypertension, renal failure, 

stroke


Past Surgical History: PTCA


Social history: denies: smoking, alcohol abuse, prescription drug abuse, IV drug

use


Family history: hypertension





Medications and Allergies


                                    Allergies











Allergy/AdvReac Type Severity Reaction Status Date / Time


 


adhesive Allergy  Rash Verified 07/25/22 14:07


 


ibuprofen [From Motrin] Allergy  Swelling Verified 07/25/22 14:07


 


lisinopril Allergy  COUGH Verified 07/25/22 14:07


 


metoprolol Allergy  Nausea Verified 07/25/22 14:07


 


amiodarone AdvReac  Nausea Verified 07/25/22 14:07


 


Iodinated Contrast Media AdvReac  KIDNEYS Verified 07/25/22 14:07





[Iodinated Contrast Media -   SHUT DOWN  





IV Dye]     











                                Home Medications











 Medication  Instructions  Recorded  Confirmed  Last Taken  Type


 


Digoxin [Lanoxin] 0.25 mg PO DAILY@1700 #30 tablet 01/05/15 06/27/17 06/26/17 Rx


 


Aspirin 325 mg PO QDAY 06/27/17 06/27/17 06/26/17 History


 


Furosemide [Lasix TAB] 40 mg PO QDAY 06/27/17 06/27/17 06/26/17 History


 


Losartan [Cozaar] 50 mg PO QDAY 06/27/17 06/27/17 06/26/17 History


 


HYDROcodone/APAP 5-325 [[place] 1 each PO Q6HR PRN #20 tablet 02/27/20  Unknown Rx





5/325]     


 


Penicillin Vk [Veetids TAB] 500 mg PO QID #21 tablet 02/27/20  Unknown Rx


 


Acetaminophen [Acetaminophen TAB] 1,000 mg PO Q6HR #30 tablet 05/27/20  Unknown 

Rx


 


cephALEXin [Keflex] 500 mg PO Q8HR #30 cap 05/27/20  Unknown Rx











Active Meds: 


Active Medications





Acetaminophen (Acetaminophen 325 Mg Tab)  650 mg PO Q4H PRN


   PRN Reason: Pain MILD(1-3)/Fever >100.5/HA


Amlodipine Besylate (Amlodipine 10 Mg Tab)  10 mg PO QDAY [facility]


   Last Admin: 07/25/22 19:40 Dose:  10 mg


   


Aspirin (Aspirin 325 Mg Tab)  325 mg PO QDAY [facility]


Dextrose (Dextrose 50% In Water (25gm) 50 Ml Syringe)  0 ml IV Q1H PRN


   PRN Reason: Hypoglycemia


   Last Admin: 07/26/22 03:43 Dose:  50 ml


   


Dextrose (Dextrose 50% In Water (25gm) 50 Ml Syringe)  100 ml IV Q30MIN PRN; 

Protocol


   PRN Reason: Hypoglycemia


Digoxin (Digoxin 0.25 Mg Tab)  0.25 mg PO DAILY@1700 [facility]


   Last Admin: 07/25/22 19:43 Dose:  0.25 mg


   


Glucagon (Glucagon (Human Recombinant) 1 Mg/Ml Inj)  1 mg IV Q1H PRN


   PRN Reason: Hypoglycemia


   Last Admin: 07/25/22 16:08 Dose:  1 mg


   


Heparin Sodium (Porcine) (Heparin 5,000 Unit/1 Ml Vial)  5,000 unit SUB-Q Q12HR 

[facility]


   Last Admin: 07/25/22 23:58 Dose:  5,000 unit


   


Hydralazine HCl (Hydralazine 20 Mg/1 Ml Inj)  10 mg IV Q2H PRN


   PRN Reason: Blood Pressure


Hydralazine HCl (Hydralazine 100 Mg Tab)  100 mg PO Q8HR [facility]


   Last Admin: 07/26/22 06:28 Dose:  100 mg


   


CALC GLUCONATE 1GM/ ML (Calcium Gluonate/Ns 1,000mg/100ml)  1 gm in 100 

mls @ 200 mls/hr IV Q30MIN [facility]


   Stop: 07/26/22 16:29


   Last Admin: 07/25/22 18:04 Dose:  200 mls/hr


   


Dextrose (D10w)  1,000 mls @ 100 mls/hr IV AS DIRECT [facility]


Sodium Bicarbonate 150 meq/ (Dextrose)  1,150 mls @ 100 mls/hr IV AS DIRECT [facility]


   Last Admin: 07/26/22 01:10 Dose:  100 mls/hr


   


Sodium Chloride (Nacl 0.9%)  100 mls @ 999 mls/hr IV CASSIDY PRN


   PRN Reason: Hypotension


Morphine Sulfate (Morphine 2 Mg/1 Ml Inj)  2 mg IV Q4H PRN


   PRN Reason: Pain, Moderate (4-6)


Ondansetron HCl (Ondansetron 4 Mg/2 Ml Inj)  4 mg IV Q8H PRN


   PRN Reason: Nausea And Vomiting


   Last Admin: 07/25/22 23:30 Dose:  4 mg


   


Oxycodone/Acetaminophen (Oxycodone /Acetaminophen 5-325mg Tab)  1 tab PO Q6H PRN


   PRN Reason: Pain, Moderate (4-6)


Sodium Chloride (Sodium Chloride 0.9% 10 Ml Flush Syringe)  10 ml IV BID [facility]


   Last Admin: 07/25/22 23:58 Dose:  10 ml


   


Sodium Chloride (Sodium Chloride 0.9% 10 Ml Flush Syringe)  10 ml IV PRN PRN


   PRN Reason: LINE FLUSH











Review of Systems


All systems: negative


Constitutional: fatigue, weakness, malaise


Cardiovascular: lightheadedness, dyspnea on exertion





Exam





- Vital Signs


Vital signs: 


                                   Vital Signs











Temp Pulse Resp BP Pulse Ox


 


 99.0 F   120 H  16   150/90   98 


 


 07/25/22 11:52  07/25/22 11:52  07/25/22 11:52  07/25/22 11:52  07/25/22 11:52














- General Appearance


General appearance: well-developed, appears stated age


EENT: ATNC, PERRL, mucous membranes moist


Neck: Present: neck supple


Respiratory: Decreased Breath Sounds


Heart: regular, S1S2


Gastrointestinal: Present: normoactive bowel sounds, obese


Integumentary: no rash


Neurologic: no focal deficit, alert and oriented x3, strength 5/5, CN 3-12 

intact


Psychiatric: mood/affect appropriate, cooperative





Results





- Lab Results





                                 07/26/22 04:16





                                 07/26/22 04:16


                             Most recent lab results











Calcium  8.9 mg/dL (8.4-10.2)   07/26/22  04:16    


 


Magnesium  2.00 mg/dL (1.7-2.3)   07/25/22  13:19    














Assessment and Plan





- Patient Problems


(1) CARLEY (acute kidney injury)


Current Visit: Yes   Status: Acute   


Plan to address problem: 


given refractory hyperkalemia and metabolic acidosis, will initiate HD via 

vascath. Appreciate ICU team for vascath placement. plan for daily HD x 3 days 

for correction of solute clearance, hyperkalemia, metabolic acidosis. check 

renal US to rule out obstructive uropathy. check UA, urine lytes, urine 

protein/Cr ratio. 


avoid nephrotoxins, NSAIDs, IV contrast. Will monitor lytes and renal parameters

 closely and make further recommendations








(2) Hyperkalemia


Current Visit: Yes   Status: Acute   


Plan to address problem: 


persistent hyperkalemia s/p medical treatment, will initiate HD 








(3) CHF (congestive heart failure), NYHA class II


Current Visit: Yes   Status: Chronic   


Qualifiers: 


   Congestive heart failure chronicity: chronic 


Plan to address problem: 


volume control with HD. Echo pending, follow cardiology recommendations 








(4) Hyponatremia


Current Visit: Yes   Status: Acute   


Plan to address problem: 


likely hypervolemic hyponatremia, to be corrected with volume control via HD 








(5) Atrial fibrillation with RVR


Current Visit: No   Status: Acute

## 2022-07-26 NOTE — ELECTROCARDIOGRAPH REPORT
[facility]

                                       

Test Date:    2022               Test Time:    13:55:08

Pat Name:     CONSUELO ARNOLD            Department:   

Patient ID:   [id number]          Room:         A257

Gender:       M                        Technician:   NURSE

:          1980               Requested By: RADHA RICHARDSON

Order Number: [id number]              Reading MD:   Anderson Reid

                                 Measurements

Intervals                              Axis          

Rate:         122                      P:            

[place]:                                    QRS:          187

QRSD:         88                       T:            34

QT:           320                                    

QTc:          456                                    

                           Interpretive Statements

Atrial fibrillation

Anterolateral infarct, old

No previous ECG available for comparison

Electronically Signed On 2022 9:40:34 EDT by Anderson Reid

## 2022-07-26 NOTE — PROCEDURE NOTE
Date of procedure: 07/26/22


Pre-op diagnosis: estiven requiring HD, Acute metabolic encephalopathy


Post-op diagnosis: same


Procedure: 





Statement of consent: Telephone consent obtained from mother Luciana Olmos at 

[phone]





Trialysis catheter was placed in Right internal jugular vein. 





Sterile technique was utilized.  Patient prepped and placed in trendelenburg 

position. Area prepped with chlorhexidine/ full body drape utilized.  Target 

vessel visualized with ultrasound.





Using seldinger technique, a finder needle was used to puncture target vessel.  

Wire was threaded through the needle, skin was nicked and needle was withdrawn 

over wire.  A dilator was passed over the wire and tract was dilated. A central 

venous line catheter was threaded over a wire.





All three ports withdrew blood and flushed without difficulty with saline. Line 

sutured in place, biopatch not available and dressed with tegaderm. 





Chest x-ray to confirm placement.  





Pt tolerated procedure well. VS remained stable throughout procedure.  





(time spent placing line not included in daily critical care time) 





CCT: 60 mins

## 2022-07-26 NOTE — XRAY REPORT
CHEST 1 VIEW 7/26/2022 10:11 AM



INDICATION / CLINICAL INFORMATION: trialysis catheter placement verification..



COMPARISON: 7/25/2022



FINDINGS:



SUPPORT DEVICES: Right IJ central venous catheter tip projects over superior cavoatrial junction.



HEART / MEDIASTINUM: Stable cardiomegaly. 



LUNGS / PLEURA: Stable pulmonary vascular congestion. No pneumothorax. 



ADDITIONAL FINDINGS: No significant additional findings.



IMPRESSION:

1. Right IJ CVL tip projects over the superior cavoatrial junction.



Signer Name: Derek Warren MD 

Signed: 7/26/2022 10:22 AM

Workstation Name: [id number]

## 2022-07-26 NOTE — CONSULTATION
History of Present Illness


Consult date: 07/26/22


Requesting physician: CAMMIE MA


History of present illness: 





Patient is a 42-year-old male with a past medical history of HFrEF, nonischemic 

cardiomyopathy, chronic A. fib, hypertension, COPD, diabetes, history of CVA in 

January 2021 with left-sided hemiplegia and bedridden who came to the ED for 

complaint of weakness and hypoglycemia.  History is taken from chart due to 

patient mental status.  Per documentation patient's caretaker reported patient 

having weakness and unable to urinate for 2 days EMS was notified and found malgorzata sung to to be hypoglycemic.  Patient transported to ED for further evaluation. 

In the ED patient was found to be hyperkalemic with a potassium of 7.3 and a 

creatinine of 16.  Of note patient was also in A. fib however patient is appears

not to be on anticoagulation for unknown reason.  Patient is previously unknown 

to our practice cardiology was consulted for heart failure.





Past History


Past Medical History: atrial fib, diabetes (on oral medications), heart failure,

hypertension, renal failure, stroke


Past Surgical History: PTCA


Social history: denies: smoking, alcohol abuse, prescription drug abuse, IV drug

use


Family history: hypertension





Medications and Allergies


                                    Allergies











Allergy/AdvReac Type Severity Reaction Status Date / Time


 


adhesive Allergy  Rash Verified 07/25/22 14:07


 


ibuprofen [From Motrin] Allergy  Swelling Verified 07/25/22 14:07


 


lisinopril Allergy  COUGH Verified 07/25/22 14:07


 


metoprolol Allergy  Nausea Verified 07/25/22 14:07


 


amiodarone AdvReac  Nausea Verified 07/25/22 14:07


 


Iodinated Contrast Media AdvReac  KIDNEYS Verified 07/25/22 14:07





[Iodinated Contrast Media -   SHUT DOWN  





IV Dye]     











                                Home Medications











 Medication  Instructions  Recorded  Confirmed  Last Taken  Type


 


Digoxin [Lanoxin] 0.25 mg PO DAILY@1700 #30 tablet 01/05/15 06/27/17 06/26/17 Rx


 


Aspirin 325 mg PO QDAY 06/27/17 06/27/17 06/26/17 History


 


Furosemide [Lasix TAB] 40 mg PO QDAY 06/27/17 06/27/17 06/26/17 History


 


Losartan [Cozaar] 50 mg PO QDAY 06/27/17 06/27/17 06/26/17 History


 


HYDROcodone/APAP 5-325 [[place] 1 each PO Q6HR PRN #20 tablet 02/27/20  Unknown Rx





5/325]     


 


Penicillin Vk [Veetids TAB] 500 mg PO QID #21 tablet 02/27/20  Unknown Rx


 


Acetaminophen [Acetaminophen TAB] 1,000 mg PO Q6HR #30 tablet 05/27/20  Unknown 

Rx


 


cephALEXin [Keflex] 500 mg PO Q8HR #30 cap 05/27/20  Unknown Rx











Active Meds: 


Active Medications





Acetaminophen (Acetaminophen 325 Mg Tab)  650 mg PO Q4H PRN


   PRN Reason: Pain MILD(1-3)/Fever >100.5/HA


Amlodipine Besylate (Amlodipine 10 Mg Tab)  10 mg PO QDAY [facility]


   Last Admin: 07/26/22 10:18 Dose:  10 mg


   


Aspirin (Aspirin 325 Mg Tab)  325 mg PO QDAY [facility]


   Last Admin: 07/26/22 10:17 Dose:  325 mg


   


Dextrose (Dextrose 50% In Water (25gm) 50 Ml Syringe)  50 ml IV Q30MIN PRN; 

Protocol


   PRN Reason: Hypoglycemia


Glucagon (Glucagon (Human Recombinant) 1 Mg/Ml Inj)  1 mg IV Q1H PRN


   PRN Reason: Hypoglycemia


   Last Admin: 07/25/22 16:08 Dose:  1 mg


   


Heparin Sodium (Porcine) (Heparin 5,000 Unit/1 Ml Vial)  5,000 unit SUB-Q Q12HR 

[facility]


   Last Admin: 07/26/22 10:16 Dose:  5,000 unit


   


Hydralazine HCl (Hydralazine 20 Mg/1 Ml Inj)  10 mg IV Q2H PRN


   PRN Reason: Blood Pressure


Hydralazine HCl (Hydralazine 100 Mg Tab)  100 mg PO Q8HR [facility]


   Last Admin: 07/26/22 06:28 Dose:  100 mg


   


CALC GLUCONATE 1GM/ ML (Calcium Gluonate/Ns 1,000mg/100ml)  1 gm in 100 

mls @ 200 mls/hr IV Q30MIN [facility]


   Stop: 07/26/22 16:29


   Last Admin: 07/25/22 18:04 Dose:  200 mls/hr


   


Sodium Chloride (Nacl 0.9%)  100 mls @ 999 mls/hr IV CASSIDY PRN


   PRN Reason: Hypotension


Dextrose (D10w)  1,000 mls @ 75 mls/hr IV AS DIRECT [facility]


Morphine Sulfate (Morphine 2 Mg/1 Ml Inj)  2 mg IV Q4H PRN


   PRN Reason: Pain, Moderate (4-6)


Ondansetron HCl (Ondansetron 4 Mg/2 Ml Inj)  4 mg IV Q8H PRN


   PRN Reason: Nausea And Vomiting


   Last Admin: 07/25/22 23:30 Dose:  4 mg


   


Oxycodone/Acetaminophen (Oxycodone /Acetaminophen 5-325mg Tab)  1 tab PO Q6H PRN


   PRN Reason: Pain, Moderate (4-6)


Sodium Chloride (Sodium Chloride 0.9% 10 Ml Flush Syringe)  10 ml IV BID [facility]


   Last Admin: 07/26/22 10:17 Dose:  10 ml


   


Sodium Chloride (Sodium Chloride 0.9% 10 Ml Flush Syringe)  10 ml IV PRN PRN


   PRN Reason: LINE FLUSH











Review of Systems


ROS unobtainable: due to mental status





Physical Examination


                                   Vital Signs











Temp Pulse Resp BP Pulse Ox


 


 99.0 F   120 H  16   150/90   98 


 


 07/25/22 11:52  07/25/22 11:52  07/25/22 11:52  07/25/22 11:52  07/25/22 11:52











General appearance: no acute distress


Neck: Positive: trachea midline


Cardiac: Positive: irregularly irregular


Lungs: Positive: Decreased Breath Sounds


Neuro: Positive: Grossly Intact


Abdomen: Positive: Soft


Skin: Negative: Rash, Suspicious Lesions, Ulceration


Extremities: Present: upper extr. pulses, edema





Results





                                 07/26/22 04:16





                                 07/26/22 11:27


                                 Cardiac Enzymes











  07/26/22 Range/Units





  04:16 


 


AST  45 H  (5-40)  units/L








                                       CBC











  07/26/22 Range/Units





  04:16 


 


WBC  16.0 H  (4.5-11.0)  K/mm3


 


RBC  4.51  (3.65-5.03)  M/mm3


 


Hgb  13.5  (11.8-15.2)  gm/dl


 


Hct  41.1  (35.5-45.6)  %


 


Plt Count  283  (140-440)  K/mm3








                          Comprehensive Metabolic Panel











  07/25/22 07/26/22 07/26/22 Range/Units





  22:33 00:56 04:16 


 


Sodium  130 L  130 L  127 L  (137-145)  mmol/L


 


Potassium  7.8 H*  7.2 H*  7.2 H*  (3.6-5.0)  mmol/L


 


Chloride  92.6 L  92.2 L  92.6 L  ()  mmol/L


 


Carbon Dioxide  17 L  17 L  14 L  (22-30)  mmol/L


 


BUN  110 H  111 H  113 H  (9-20)  mg/dL


 


Creatinine  16.5 H  16.0 H  16.1 H  (0.8-1.3)  mg/dL


 


Glucose  49 L  103 H  83  ()  mg/dL


 


Calcium  8.8  8.8  8.9  (8.4-10.2)  mg/dL


 


AST    45 H  (5-40)  units/L


 


ALT    33  (7-56)  units/L


 


Alkaline Phosphatase    131 H  ()  units/L


 


Total Protein    7.2  (6.3-8.2)  g/dL


 


Albumin    2.4 L  (3.9-5)  g/dL














  07/26/22 Range/Units





  11:27 


 


Sodium  133 L  (137-145)  mmol/L


 


Potassium  5.4 H D  (3.6-5.0)  mmol/L


 


Chloride  93.3 L  ()  mmol/L


 


Carbon Dioxide  21 L D  (22-30)  mmol/L


 


BUN  96 H  (9-20)  mg/dL


 


Creatinine  13.6 H  (0.8-1.3)  mg/dL


 


Glucose  130 H  ()  mg/dL


 


Calcium  8.0 L  (8.4-10.2)  mg/dL


 


AST   (5-40)  units/L


 


ALT   (7-56)  units/L


 


Alkaline Phosphatase   ()  units/L


 


Total Protein   (6.3-8.2)  g/dL


 


Albumin   (3.9-5)  g/dL














- Imaging and Cardiology


Echo: report reviewed





Assessment and Plan





Patient is a 42-year-old male with a past medical history of HFrEF, nonischemic 

cardiomyopathy, chronic A. fib, hypertension, COPD, diabetes, history of CVA in 

January 2021 with left-sided hemiplegia and bedridden who came to the ED for 

complaint of weakness and hypoglycemia





Acute renal failure-nephrology following


Altered mental status


Acute on chronic heart failure


Hyperkalemia


Hyponatremia


Hypoglycemia


Hypertension


Cardiomyopathy


Diabetes


History of CVA


A. fib





Echo 11/8/2020-the study was technically difficult in quality.  Arrhythmia was 

noted during the study LVEF is moderately decreased 35-40%. Regional wall motion

 abnormalities noted RV moderately dilated. RV systolic function is moderately 

reduced. RVSP is mildly elevated No pericardial effusion No definite or 

significant change in the EF from 10/15/2018 Consider an infiltrative 

cardiomyopathy such as amyloid


Echo 7/26/2022-EF 20 to 25% moderate concentric LVH.  Right ventricle is 

dilated.  Right ventricle hypokinetic.  Left atrium is severely dilated.  Right 

atrium dilated


Per documentation outpatient medications: Losartan 50 mg p.o. daily Lasix 40 mg 

p.o. daily, digoxin 0.25 mg p.o. daily, asa





Plan:


No EKG in chart.  EKG pending


Telemetry reviewed patient appears to be in A. fib rate 110s-120s


No amiodarone or beta-blocker due to documented allergy


Review of records shows patient not started on anticoagulation in 2021 due to 

concern of hemorrhagic conversion. Unclear as to why patient is not currently on

 anticoagulation as an outpatient.


Due to patients mental status would recommend clearance from neurology before 

starting anticoagulation


Due to renal function digoxin currently being held


Increased heart rate may be compensatory due to acute renal failure.  Will 

continue to monitor


Due to low EF did not recommend diltiazem at this time


BNP noted to be elevated patient appears hypervolemic on exam however due to 

renal function will defer to nephrology recommendations for volume management


No ACE/ARB due to renal function





Patient seen in conjunction with Dr. Reid who agrees with plan of care


30 minutes critical care time spent care coordination.








- Patient Problems


(1) CARLEY (acute kidney injury)


Current Visit: Yes   Status: Acute   





(2) Hyperkalemia


Current Visit: Yes   Status: Acute   





(3) Hypoglycemia


Current Visit: Yes   Status: Acute   





(4) Hyponatremia


Current Visit: Yes   Status: Acute   





(5) Acute on chronic systolic CHF (congestive heart failure)


Current Visit: No   Status: Acute   





(6) Atrial fibrillation with RVR


Current Visit: No   Status: Acute   





(7) COPD (chronic obstructive pulmonary disease)


Current Visit: No   Status: Acute   





(8) History of noncompliance with medical treatment


Current Visit: No   Status: Acute   





(9) Obesity


Current Visit: No   Status: Acute   





(10) Cardiomyopathy


Current Visit: No   Status: Chronic

## 2022-07-26 NOTE — HISTORY AND PHYSICAL REPORT
History of Present Illness


Date of examination: 22


Date of admission: 


22 16:54





Chief complaint: 


Severe weakness and low blood glucose levels





History of present illness: 


42-year-old -American male with history of hypertension and right 

cerebrovascular accident in 2021 with left side hemiplegia and bedridden

and noncompliant comes in for generalized weakness and low blood glucose level. 

Patient called EMS.  As per EMS blood glucose levels were in the 40s.  Patient 

also not able to urinate for 2 days.  Shortness of breath present.  Patient has 

been bedridden for the last 1 and half years since 2021.  Patient was 

admitted to [facility] in 2021.  His baseline Creatinine was 

18/1.0 on 2020.  No chest pain.  Shortness of breath on minimal exertion 

present.  No fever or chills.  Patient has not been following with his 

cardiologist or nephrologist primary care physician.  Takes his blood pressure 

medicine.  Does not know whether it is controlled or not.  Patient has a history

of cardiac stent in  and cardiomyopathy with ejection fraction of 20% and 

atrial fibrillation but not on any anticoagulation.  Nausea present.  No 

vomiting.


ED Course--in the emergency room blood pressure was very high with the 

diastolics in 120 and potassium of 7.3 and a creatinine of about 18














- Past Medical History


--Hypertension: Yes


--CVA: Yes


--Heart Attack/AMI: Yes (Echo in 2013, EF-20-25%, MI )


--Congestive Heart Failure: Yes


--Renal Disease: Yes


--Kidney Stones: Yes


--Asthma: Yes


--COPD: Yes


--Additional medical history: a fib. ,  Cardiomyopathy 20% ejection fraction, 

atrial clot





- Surgical History


--Coronary Stent: Yes (2017)


--Additional Surgical History: r/l lithotripsy 2019





- Social History


Smoking Status: Current Every Day Smoker


Substance Use Type: None





- Medications


Home Medications: 


                                Home Medications











 Medication  Instructions  Recorded  Confirmed  Last Taken  Type


 


Digoxin [Lanoxin] 0.25 mg PO DAILY@1700 #30 tablet 01/05/15 06/27/17 06/26/17 Rx


 


Aspirin 325 mg PO QDAY 17 History


 


Furosemide [Lasix TAB] 40 mg PO QDAY 17 History


 


Losartan [Cozaar] 50 mg PO QDAY 17 History


 


HYDROcodone/APAP 5-325 [[place] 1 each PO Q6HR PRN #20 tablet 20  Unknown Rx





5/325]     


 


Penicillin Vk [Veetids TAB] 500 mg PO QID #21 tablet 20  Unknown Rx


 


Acetaminophen [Acetaminophen TAB] 1,000 mg PO Q6HR #30 tablet 20  Unknown 

Rx


 


cephALEXin patient is s/p cardiac stent and 2017 and has history of 

cardiomyopathy with ejection fraction of 20%.  Also A. fib but not on any 

anticoagulation. 500 mg PO Q8HR #30 cap 20  Unknown Rx














Review of Systems


ROS: 


Constitutional generalized weakness


HEENT no sore throat no post nasal drip no diplopia


Neck no neck stiffness no lymph gland enlargement


Chest and lungs shortness of breath present.


CVS left hemiplegia and bedridden


GI no nausea no vomiting no diarrhea


Genitourinary system no dysuria no flank pain


Musculoskeletal system no muscle pains no joint pains


CNS no syncope no seizures


Skin no rash no itching


Psychiatric no depression no homicidal or suicidal tendencies


Hematologic no lymphedema or bruising


Endocrine no polydipsia no polyuria no cold intolerance no heat intolerance

















Medications and Allergies


                                    Allergies











Allergy/AdvReac Type Severity Reaction Status Date / Time


 


adhesive Allergy  Rash Verified 22 14:07


 


ibuprofen [From Motrin] Allergy  Swelling Verified 22 14:07


 


lisinopril Allergy  COUGH Verified 22 14:07


 


metoprolol Allergy  Nausea Verified 22 14:07


 


amiodarone AdvReac  Nausea Verified 22 14:07


 


Iodinated Contrast Media AdvReac  KIDNEYS Verified 22 14:07





[Iodinated Contrast Media -   SHUT DOWN  





IV Dye]     











                                Home Medications











 Medication  Instructions  Recorded  Confirmed  Last Taken  Type


 


Digoxin [Lanoxin] 0.25 mg PO DAILY@1700 #30 tablet 01/05/15 06/27/17 06/26/17 Rx


 


Aspirin 325 mg PO QDAY 17 History


 


Furosemide [Lasix TAB] 40 mg PO QDAY 17 History


 


Losartan [Cozaar] 50 mg PO QDAY 17 History


 


HYDROcodone/APAP 5-325 [[place] 1 each PO Q6HR PRN #20 tablet 20  Unknown Rx





5/325]     


 


Penicillin Vk [Veetids TAB] 500 mg PO QID #21 tablet 20  Unknown Rx


 


Acetaminophen [Acetaminophen TAB] 1,000 mg PO Q6HR #30 tablet 20  Unknown 

Rx


 


cephALEXin [Keflex] 500 mg PO Q8HR #30 cap 20  Unknown Rx











Active Meds: 


Active Medications





Acetaminophen (Acetaminophen 325 Mg Tab)  650 mg PO Q4H PRN


   PRN Reason: Pain MILD(1-3)/Fever >100.5/HA


Amlodipine Besylate (Amlodipine 10 Mg Tab)  10 mg PO QDAY [facility]


   Last Admin: 22 19:40 Dose:  10 mg


   


Aspirin (Aspirin 325 Mg Tab)  325 mg PO QDAY [facility]


Dextrose (Dextrose 50% In Water (25gm) 50 Ml Syringe)  0 ml IV Q1H PRN


   PRN Reason: Hypoglycemia


   Last Admin: 22 03:43 Dose:  50 ml


   


Dextrose (Dextrose 50% In Water (25gm) 50 Ml Syringe)  100 ml IV Q30MIN PRN; 

Protocol


   PRN Reason: Hypoglycemia


Digoxin (Digoxin 0.25 Mg Tab)  0.25 mg PO DAILY@1700 [facility]


   Last Admin: 22 19:43 Dose:  0.25 mg


   


Glucagon (Glucagon (Human Recombinant) 1 Mg/Ml Inj)  1 mg IV Q1H PRN


   PRN Reason: Hypoglycemia


   Last Admin: 22 16:08 Dose:  1 mg


   


Heparin Sodium (Porcine) (Heparin 5,000 Unit/1 Ml Vial)  5,000 unit SUB-Q Q12HR 

[facility]


   Last Admin: 22 23:58 Dose:  5,000 unit


   


Hydralazine HCl (Hydralazine 20 Mg/1 Ml Inj)  10 mg IV Q2H PRN


   PRN Reason: Blood Pressure


Hydralazine HCl (Hydralazine 100 Mg Tab)  100 mg PO Q8HR [facility]


Dextrose (D10w)  1,000 mls @ 100 mls/hr IV AS DIRECT [facility]


   Last Admin: 22 04:28 Dose:  100 mls/hr


   


CALC GLUCONATE 1GM/ ML (Calcium Gluonate/Ns 1,000mg/100ml)  1 gm in 100 

mls @ 200 mls/hr IV Q30MIN [facility]


   Stop: 22 16:29


   Last Admin: 22 18:04 Dose:  200 mls/hr


   


Amiodarone HCl 900 mg/ (Dextrose)  500 mls @ 33.333 mls/hr IV AS DIRECT [facility]; 

Protocol


Dextrose (D10w)  1,000 mls @ 100 mls/hr IV AS DIRECT [facility]


Sodium Bicarbonate 150 meq/ (Dextrose)  1,150 mls @ 100 mls/hr IV AS DIRECT [facility]


   Last Admin: 22 01:10 Dose:  100 mls/hr


   


Morphine Sulfate (Morphine 2 Mg/1 Ml Inj)  2 mg IV Q4H PRN


   PRN Reason: Pain, Moderate (4-6)


Ondansetron HCl (Ondansetron 4 Mg/2 Ml Inj)  4 mg IV Q8H PRN


   PRN Reason: Nausea And Vomiting


   Last Admin: 22 23:30 Dose:  4 mg


   


Oxycodone/Acetaminophen (Oxycodone /Acetaminophen 5-325mg Tab)  1 tab PO Q6H PRN


   PRN Reason: Pain, Moderate (4-6)


Sodium Chloride (Sodium Chloride 0.9% 10 Ml Flush Syringe)  10 ml IV BID [facility]


   Last Admin: 22 23:58 Dose:  10 ml


   


Sodium Chloride (Sodium Chloride 0.9% 10 Ml Flush Syringe)  10 ml IV PRN PRN


   PRN Reason: LINE FLUSH


Sodium Polystyrene Sulfonate (Sodium Polystyrene 15 Gm/60 Ml Oral Liqd)  30 gm 

PO Q4HR [facility]


   Stop: 22 10:01


   Last Admin: 22 03:45 Dose:  30 gm


   











Exam





- Constitutional


Vitals: 


                                        











Temp Pulse Resp BP Pulse Ox


 


 98.2 F   106 H  40 H  128/76   95 


 


 22 04:00  22 04:57  22 04:41  22 04:41  22 04:41











General appearance: Present: mild distress, well-nourished





- EENT


Eyes: Present: PERRL


ENT: hearing intact, clear oral mucosa





- Neck


Neck: Present: supple, normal ROM





- Respiratory


Respiratory effort: normal


Respiratory: bilateral: CTA





- Cardiovascular


Heart rate: 120


Rhythm: irregularly irregular


Heart Sounds: Present: S1 & S2.  Absent: rub, click





- Extremities


Extremities: no ischemia, pulses symmetrical, No edema


Peripheral Pulses: within normal limits





- Abdominal


General gastrointestinal: Present: soft, non-tender, non-distended, normal bowel

 sounds


Male genitourinary: Present: normal





- Integumentary


Integumentary: Present: clear, warm, dry





- Musculoskeletal


Musculoskeletal: left sided weakness (Left hemiplegia)





- Psychiatric


Psychiatric: appropriate mood/affect, intact judgment & insight





- Neurologic


Neurologic: CNII-XII intact, focal deficits (Left hemiplegia), moves all 

extremities





HEART Score





- HEART Score


Age: 45-65


Risk factors: > 3 risk factors or hx of atherosclerotic disease


Troponin: 1-3x normal limit





- Critical Actions


Critical Actions: 4-6 pts:12-16.6% risk of adverse cardiac event. Should be 

admitted





Results





- Labs


CBC & Chem 7: 


                                 22 04:16





                                 22 04:16


Labs: 


                             Laboratory Last Values











WBC  16.0 K/mm3 (4.5-11.0)  H  22  04:16    


 


RBC  4.51 M/mm3 (3.65-5.03)   22  04:16    


 


Hgb  13.5 gm/dl (11.8-15.2)   22  04:16    


 


Hct  41.1 % (35.5-45.6)   22  04:16    


 


MCV  91 fl (84-94)   22  04:16    


 


MCH  30 pg (28-32)   22  04:16    


 


MCHC  33 % (32-34)   22  04:16    


 


RDW  14.3 % (13.2-15.2)   22  04:16    


 


Plt Count  283 K/mm3 (140-440)   22  04:16    


 


Sodium  127 mmol/L (137-145)  L  22  04:16    


 


Potassium  7.2 mmol/L (3.6-5.0)  H*  22  04:16    


 


Chloride  92.6 mmol/L ()  L  22  04:16    


 


Carbon Dioxide  14 mmol/L (22-30)  L  22  04:16    


 


Anion Gap  28 mmol/L  22  04:16    


 


BUN  113 mg/dL (9-20)  H  22  04:16    


 


Creatinine  16.1 mg/dL (0.8-1.3)  H  22  04:16    


 


Estimated GFR  4 ml/min  22  04:16    


 


BUN/Creatinine Ratio  7 %  22  04:16    


 


Glucose  83 mg/dL ()   22  04:16    


 


POC Glucose  112 mg/dL ()  H  22  05:30    


 


Calcium  8.9 mg/dL (8.4-10.2)   22  04:16    


 


Magnesium  2.00 mg/dL (1.7-2.3)   22  13:19    


 


Total Bilirubin  0.80 mg/dL (0.1-1.2)   22  04:16    


 


AST  45 units/L (5-40)  H  22  04:16    


 


ALT  33 units/L (7-56)   22  04:16    


 


Alkaline Phosphatase  131 units/L ()  H  22  04:16    


 


Total Protein  7.2 g/dL (6.3-8.2)   22  04:16    


 


Albumin  2.4 g/dL (3.9-5)  L  22  04:16    


 


Albumin/Globulin Ratio  0.5 %  22  04:16    








                                    Short CBC











  22 Range/Units





  13:19 04:16 


 


WBC  15.3 H  16.0 H  (4.5-11.0)  K/mm3


 


Hgb  15.7 H  13.5  (11.8-15.2)  gm/dl


 


Hct  47.0 H  41.1  (35.5-45.6)  %


 


Plt Count  306  283  (140-440)  K/mm3








                                       BMP











  22





  13:19 22:33 00:56


 


Sodium  128 L  130 L  130 L


 


Potassium  7.2 H*  7.8 H*  7.2 H*


 


Chloride  93.1 L  92.6 L  92.2 L


 


Carbon Dioxide  17 L  17 L  17 L


 


BUN  107 H  110 H  111 H


 


Creatinine  15.7 H  16.5 H  16.0 H


 


Glucose  54 L  49 L  103 H


 


Calcium  9.1  8.8  8.8














  22





  04:16


 


Sodium  127 L


 


Potassium  7.2 H*


 


Chloride  92.6 L


 


Carbon Dioxide  14 L


 


BUN  113 H


 


Creatinine  16.1 H


 


Glucose  83


 


Calcium  8.9








                                 Liver Function











  22 Range/Units





  13:19 04:16 


 


Total Bilirubin  0.70  0.80  (0.1-1.2)  mg/dL


 


AST  61 H  45 H  (5-40)  units/L


 


ALT  37  33  (7-56)  units/L


 


Alkaline Phosphatase  138 H  131 H  ()  units/L


 


Albumin  3.1 L  2.4 L  (3.9-5)  g/dL














- Imaging and Cardiology


EKG: report reviewed (Atrial fibrillation with heart rate of 120)


Chest x-ray: report reviewed


Imaging and Cardiology: 





Chest x-ray


Enlargement of cardiac silhouette


Bilateral venous congestion





Assessment and Plan


Assessment and plan: 


Critical care statement


The high probability OF a clinically significant sudden or life-threatening 

deterioration of the cardiorespiratory system and endocrine system required my 

full and direct attention, intervention and postoperative management.  The 

aggregate critical l care time was 62 minutes.  The time is in addition to time 

spent performing reported procedures but includes the followin: Data review and interpretation


2: Patient assessment and monitoring of vital signs


3: Documentation


4:: Medication orders and management


Advance Directives: Yes (Full code)


VTE prophylaxis?: Chemical


Plan of care discussed with patient/family: Yes





- Patient Problems


(1) Hypertensive emergency


Current Visit: Yes   Status: Acute   


Plan to address problem: 


Patient initiated on hydralazine 100 mg every 8 hours: Coreg and amlodipine.


IV hydralazine 10 mg every 3 hours as needed.


Cardene drip if necessary.








(2) Hypoglycemia


Current Visit: Yes   Status: Acute   


Plan to address problem: 


Persistent hypoglycemia


Secondary to worsening renal function


D50 W and IV glucagon as necessary.  IV D5NSW at 75 mL/h for the next 8 to 12 

hours.  May precipitate more volume overload.  Close observation.








(3) Hyperkalemia


Current Visit: Yes   Status: Acute   


Plan to address problem: 


Treated to have hyperkalemia aggressively.


Patient given Kayexalate calcium gluconate IV, sodium bicarbonate and IV 

insulin.


Repeat BMP.


Discussed with nephrology.


Patient may need emergent hemodialysis.








(4) CARLEY (acute kidney injury)


Current Visit: Yes   Status: Acute   


Plan to address problem: 


Baseline wasBUN/creatinine was 18 and 1.0


Creatinine has increased from 1.0-15.7 and 24 months.  Patient was not following

 up with any nephrology.





Noncompliance.  Possible new onset end-stage renal disease.


Work-up for ESRD defer to nephrology.


Vascular surgery consult for Vas-Cath.











(5) CHF (congestive heart failure), NYHA class II


Current Visit: Yes   Status: Chronic   


Qualifiers: 


   Congestive heart failure chronicity: chronic 


Plan to address problem: 


On Lasix.


Patient has not been urinating.  For the last 48 hrs.


Needs emergent hemodialysis for removal of volume.


Needs emergent hemodialysis for volume removal.








(6) Atrial fibrillation with RVR


Current Visit: No   Status: Acute   


Plan to address problem: 


Patient initiated on IV diltiazem.


Patient is allergic to amiodarone.








(7) DVT prophylaxis


Current Visit: Yes   Status: Acute   


Plan to address problem: 


On heparin and GI prophylaxis








(8) Advance care planning


Current Visit: Yes   Status: Acute   


Plan to address problem: 


Disease education conducted, care plan discussed, diagnosis discussed, prognosis

 discussed.  Patient and mother acknowledges understanding of the care plan





Care plan +30 minutes.

## 2022-07-27 LAB
ALBUMIN SERPL-MCNC: 2.6 G/DL (ref 3.9–5)
ALT SERPL-CCNC: 36 UNITS/L (ref 7–56)
APTT BLD: 33.5 SEC. (ref 24.2–36.6)
BUN SERPL-MCNC: 84 MG/DL (ref 9–20)
BUN/CREAT SERPL: 6 %
CALCIUM SERPL-MCNC: 7.9 MG/DL (ref 8.4–10.2)
HCT VFR BLD CALC: 38.8 % (ref 35.5–45.6)
HEMOLYSIS INDEX: 2
HGB BLD-MCNC: 12.9 GM/DL (ref 11.8–15.2)
INR PPP: 1.13 (ref 0.87–1.13)
MCHC RBC AUTO-ENTMCNC: 33 % (ref 32–34)
MCV RBC AUTO: 89 FL (ref 84–94)
PLATELET # BLD: 260 K/MM3 (ref 140–440)
RBC # BLD AUTO: 4.36 M/MM3 (ref 3.65–5.03)

## 2022-07-27 PROCEDURE — 5A1D70Z PERFORMANCE OF URINARY FILTRATION, INTERMITTENT, LESS THAN 6 HOURS PER DAY: ICD-10-PCS | Performed by: INTERNAL MEDICINE

## 2022-07-27 RX ADMIN — ASPIRIN SCH MG: 325 TABLET ORAL at 09:34

## 2022-07-27 RX ADMIN — MORPHINE SULFATE PRN MG: 2 INJECTION, SOLUTION INTRAMUSCULAR; INTRAVENOUS at 13:48

## 2022-07-27 RX ADMIN — OXYCODONE AND ACETAMINOPHEN PRN TAB: 5; 325 TABLET ORAL at 10:21

## 2022-07-27 RX ADMIN — DEXTROSE SCH MLS/HR: 10 SOLUTION INTRAVENOUS at 11:14

## 2022-07-27 RX ADMIN — HYDRALAZINE HYDROCHLORIDE SCH: 100 TABLET, FILM COATED ORAL at 05:44

## 2022-07-27 RX ADMIN — MORPHINE SULFATE PRN MG: 2 INJECTION, SOLUTION INTRAMUSCULAR; INTRAVENOUS at 08:21

## 2022-07-27 RX ADMIN — Medication SCH ML: at 21:40

## 2022-07-27 RX ADMIN — AMIODARONE HYDROCHLORIDE SCH MLS/HR: 50 INJECTION, SOLUTION INTRAVENOUS at 11:58

## 2022-07-27 RX ADMIN — ACETAMINOPHEN PRN MG: 325 TABLET ORAL at 09:52

## 2022-07-27 RX ADMIN — OXYCODONE AND ACETAMINOPHEN PRN TAB: 5; 325 TABLET ORAL at 01:21

## 2022-07-27 RX ADMIN — HEPARIN SODIUM SCH UNIT: 5000 INJECTION, SOLUTION INTRAVENOUS; SUBCUTANEOUS at 09:36

## 2022-07-27 RX ADMIN — Medication SCH ML: at 09:38

## 2022-07-27 RX ADMIN — HEPARIN SODIUM SCH MLS/HR: 5000 INJECTION, SOLUTION INTRAVENOUS at 14:44

## 2022-07-27 RX ADMIN — ONDANSETRON PRN MG: 2 INJECTION INTRAMUSCULAR; INTRAVENOUS at 08:14

## 2022-07-27 RX ADMIN — CEFTRIAXONE SODIUM SCH MLS/HR: 2 INJECTION, POWDER, FOR SOLUTION INTRAMUSCULAR; INTRAVENOUS at 13:16

## 2022-07-27 RX ADMIN — OXYCODONE AND ACETAMINOPHEN PRN TAB: 5; 325 TABLET ORAL at 23:18

## 2022-07-27 NOTE — ELECTROCARDIOGRAPH REPORT
[facility]

                                       

Test Date:    2022               Test Time:    11:35:51

Pat Name:     CONSUELO ARNOLD            Department:   

Patient ID:   [id number]          Room:         A257 1

Gender:       M                        Technician:   SHANNAN

:          1980               Requested By: VENESSA MAS

Order Number: [id number]              Reading MD:   Anderson Reid

                                 Measurements

Intervals                              Axis          

Rate:         127                      P:            

IN:                                    QRS:          239

QRSD:         91                       T:            46

QT:           312                                    

QTc:          455                                    

                           Interpretive Statements

Atrial fibrillation

Anterolateral infarct, old

Compared to ECG 2022 13:55:08

No significant changes

Electronically Signed On 2022 9:03:44 EDT by Anderson Reid

## 2022-07-27 NOTE — PROGRESS NOTE
Assessment and Plan


Assessment and plan: 








This is a 42-year-old male with HTN, right-sided CVA with residual left-sided 

hemiplegia and bedridden's, HFrEF, MI s/p stent placement, DM, asthma, COPD, 

current nicotine abuse, A. fib and noncompliance with A. fib with RVR, acute 

kidney injury with hypoglycemia and severe hypercalcemia





Neuro: Acute metabolic encephalopathy, h/o right-sided CVA with residual left-

sided hemiplegia and bedridden


-Reorientation as needed


-Maintain sleep-wake cycle


-As needed analgesia


-Neurology consulted, appreciate recommendations


   -consulted for guidance re anticoagulation per cards request


-PT/OT consulted





Cardiac: A. fib, Acute on Chronic Systolic HF, h/o MI s/p stent placement,  

nonischemic cardiomyopathy, chronic A. fib


-Cardiology consulted, appreciate recommendations


-Blood pressure monitoring per protocol


-Echocardiogram shows LVEF of 20 to 25%, severe hypokinesis of septal wall and 

apex


-Digoxin discontinued


-Dc hydral, reduce amlodipine


-Amio bolus and gtt


-Per cards: Due to low EF did not recommend diltiazem at this time, No ACE/ARB 

due to renal function


-Admit proBNP [id number]





Respiratory: h/o COPD


-Currently on room air


-CCM consulted, appreciate recommendations


-Supplemental oxygen as needed


-SPO2 monitoring


-Pulmonary hygiene





GI: Morbid obesity, mild protein calorie malnutrition


-24 hours +10 mL


-PPI


-Renal cardiac CC diet with supplement


-BR: Colace





: Acute kidney injury, Hyperkalemia, hyponatremia, hypochloremia, 

hypomagnesemia ,metabolic acidosis


-Nephrology consulted, appreciate recommendations


-HD catheter placed 7/26


-HD per nephrology 


-Strict intake and output


-Renally dose medications


-Avoid nephrotoxic medications


-Urine lites pending


-Renal ultrasound consistent with medical renal disease


-Replete Mg


-Trend BMP





ID: SIRS, r/o Sepsis


-Tmax 101.5, tachycardia, leukocystosis, CARLEY


-UA pending


-BC x 2 pending


-cxr shows venous congestion


-WOCN consulted for right great toe


-Started on rocephin


-f/u blood culture


-Monitor WBC and temperature curve





Endo: Hypoglycemia,  h/o DM


-Avoid hypoglycemia


-D10 gtt


-Accu-Cheks q. 2





Heme: Leukocytosis


-Trend CBC


-Transfuse hemoglobin less than 7


-SCDs to BLE while in bed











The high probability of a clinically significant, sudden or life threatening 

deterioration of the [multi] system(s) required my full and direct attention, 

intervention and personal management. The aggregate critical care time was [60] 

minutes. This time is in addition to time spent performing reported procedures 

but includes the following: 





[x] Data Review and interpretation 





[x] Patient assessment and monitoring of vital signs 





[x] Documentation 





[x] Medication orders and management





Disposition Plan: icu


Total Time Spent with Patient (Minutes): 60





History


Interval history: 


This is a 42-year-old male with HTN, right CVA (1/2021) with residual left-sided

 hemiplegia and bedridden, heart failure with reduced EF (EF 20 to 25% in 2013),

 MI in 2016, s/p stent placement in 2017, DM, COPD, current nicotine abuse, A. 

fib and noncompliance who presented to the emergency department on 7/25 via EMS 

for generalized weakness and low blood glucose.  Per EMS patient's blood glucose

 levels were in the 40s and he complained of inability to urinate for 2 days and

 shortness of breath.  Patient was admitted to [facility] in January 2021 

and is status BUN/creatinine on 5/27/2020 was noted to be 18/1.0.  Patient 

presented to emergency department with fever of 99, , /90, lab work 

showed leukocytosis, hyponatremia at 128, hyperkalemia at 7.2, metabolic 

acidosis of 17, elevated BUN/creatinine at 107/15.7 and hyperglycemia 54.  

Patient was medically treated with calcium gluconate and bicarbonate, received 2

 L normal saline bolus and it was noted that patient refused Zamarripa catheter 

placement.  Nephrology was consulted in the emergency department.  Patient was 

admitted to the hospitalist service with hypertensive emergency, hypoglycemia, 

hyperkalemia, acute kidney injury, A. fib RVR and CHF with consults to [facility] and 

cardiology.





Hospital course to date:





7/26: Vas-Cath placed for emergent hemodialysis for which she received.  RN 

asked to BladderScan the patient for possible placement of Zamarripa catheter.  

Renal ultrasound pending.  Sodium bicarbonate drip was discontinued due to 

bicarbonate improvement into the 20s and D10 was decreased to 75.  Digoxin 

discontinued.  Neurology consulted for guidance for anticoagulation as patient 

was initially not anticoagulated for risk of hemorrhagic conversion of CVA.





7/27: Cardio will start amio as documented allergy is not a true allergy. HD 

again today. DC hydral given and decrease amlodipine re soft BPs. Bladder scan 

revealed no urine. [facility] will like to continue D10 but decrease rate and will 

start heparin gtt given afib (was on home eliquis) given possible need for 

vascath. 





Hospitalist Physical





- Constitutional


Vitals: 


                                        











Temp Pulse Resp BP Pulse Ox


 


 99.9 F H  135 H  15   127/81   97 


 


 07/27/22 09:40  07/27/22 12:00  07/27/22 11:00  07/27/22 12:00  07/27/22 11:00











General appearance: Present: no acute distress





- EENT


Eyes: Present: PERRL, EOM intact


ENT: hearing intact, clear oral mucosa, dentition normal





- Neck


Neck: Present: normal ROM





- Respiratory


Respiratory effort: normal


Respiratory: bilateral: diminished





- Cardiovascular


Rhythm: regular


Heart Sounds: Present: S1 & S2.  Absent: systolic murmur, diastolic murmur





- Extremities


Extremities: no ischemia, pulses intact, pulses symmetrical, normal temperature,

 normal color


Extremity abnormal: edema


Peripheral Pulses: within normal limits





- Abdominal


General gastrointestinal: soft, non-tender, non-distended, normal bowel sounds





- Integumentary


Integumentary: Present: warm, dry





- Psychiatric


Psychiatric: cooperative





- Neurologic


Neurologic: CNII-XII intact, focal deficits (hemiplegia to left )





- Allied Health


Allied health notes reviewed: nursing, RT, social work





HEART Score





- HEART Score


Age: 45-65


Risk factors: > 3 risk factors or hx of atherosclerotic disease


Troponin: 1-3x normal limit





- Critical Actions


Critical Actions: 4-6 pts:12-16.6% risk of adverse cardiac event. Should be 

admitted





Results





- Labs


CBC & Chem 7: 


                                07/27/22 Unknown





                                 07/27/22 04:00


Labs: 


                             Laboratory Last Values











WBC  17.9 K/mm3 (4.5-11.0)  H  07/27/22  Unknown


 


RBC  4.36 M/mm3 (3.65-5.03)   07/27/22  Unknown


 


Hgb  12.9 gm/dl (11.8-15.2)   07/27/22  Unknown


 


Hct  38.8 % (35.5-45.6)   07/27/22  Unknown


 


MCV  89 fl (84-94)   07/27/22  Unknown


 


MCH  30 pg (28-32)   07/27/22  Unknown


 


MCHC  33 % (32-34)   07/27/22  Unknown


 


RDW  14.5 % (13.2-15.2)   07/27/22  Unknown


 


Plt Count  260 K/mm3 (140-440)   07/27/22  Unknown


 


Add Manual Diff  Complete   07/26/22  04:16    


 


Total Counted  100   07/26/22  04:16    


 


Seg Neuts % (Manual)  78.0 % (40.0-70.0)  H  07/26/22  04:16    


 


Band Neutrophils %  0 %  07/26/22  04:16    


 


Lymphocytes % (Manual)  11.0 % (13.4-35.0)  L  07/26/22  04:16    


 


Reactive Lymphs % (Man)  0 %  07/26/22  04:16    


 


Monocytes % (Manual)  11.0 % (0.0-7.3)  H  07/26/22  04:16    


 


Eosinophils % (Manual)  0 % (0.0-4.3)   07/26/22  04:16    


 


Basophils % (Manual)  0 % (0.0-1.8)   07/26/22  04:16    


 


Metamyelocytes %  0 %  07/26/22  04:16    


 


Myelocytes %  0 %  07/26/22  04:16    


 


Promyelocytes %  0 %  07/26/22  04:16    


 


Blast Cells %  0 %  07/26/22  04:16    


 


Nucleated RBC %  Not Reportable   07/26/22  04:16    


 


Seg Neutrophils # Man  12.5 K/mm3 (1.8-7.7)  H  07/26/22  04:16    


 


Band Neutrophils #  0.0 K/mm3  07/26/22  04:16    


 


Lymphocytes # (Manual)  1.8 K/mm3 (1.2-5.4)   07/26/22  04:16    


 


Abs React Lymphs (Man)  0.0 K/mm3  07/26/22  04:16    


 


Monocytes # (Manual)  1.8 K/mm3 (0.0-0.8)  H  07/26/22  04:16    


 


Eosinophils # (Manual)  0.0 K/mm3 (0.0-0.4)   07/26/22  04:16    


 


Basophils # (Manual)  0.0 K/mm3 (0.0-0.1)   07/26/22  04:16    


 


Metamyelocytes #  0.0 K/mm3  07/26/22  04:16    


 


Myelocytes #  0.0 K/mm3  07/26/22  04:16    


 


Promyelocytes #  0.0 K/mm3  07/26/22  04:16    


 


Blast Cells #  0.0 K/mm3  07/26/22  04:16    


 


WBC Morphology  Not Reportable   07/26/22  04:16    


 


Hypersegmented Neuts  Not Reportable   07/26/22  04:16    


 


Hyposegmented Neuts  Not Reportable   07/26/22  04:16    


 


Hypogranular Neuts  Not Reportable   07/26/22  04:16    


 


Smudge Cells  Not Reportable   07/26/22  04:16    


 


Toxic Granulation  Not Reportable   07/26/22  04:16    


 


Toxic Vacuolation  Not Reportable   07/26/22  04:16    


 


Dohle Bodies  Not Reportable   07/26/22  04:16    


 


Pelger-Huet Anomaly  Not Reportable   07/26/22  04:16    


 


Cb Rods  Not Reportable   07/26/22  04:16    


 


Platelet Estimate  Consistent w auto   07/26/22  04:16    


 


Clumped Platelets  Not Reportable   07/26/22  04:16    


 


Plt Clumps, EDTA  Not Reportable   07/26/22  04:16    


 


Large Platelets  Not Reportable   07/26/22  04:16    


 


Giant Platelets  Not Reportable   07/26/22  04:16    


 


Platelet Satelliting  Not Reportable   07/26/22  04:16    


 


Plt Morphology Comment  Not Reportable   07/26/22  04:16    


 


RBC Morphology  Not Reportable   07/26/22  04:16    


 


Dimorphic RBCs  Not Reportable   07/26/22  04:16    


 


Polychromasia  Not Reportable   07/26/22  04:16    


 


Hypochromasia  Not Reportable   07/26/22  04:16    


 


Poikilocytosis  Not Reportable   07/26/22  04:16    


 


Anisocytosis  Not Reportable   07/26/22  04:16    


 


Microcytosis  Not Reportable   07/26/22  04:16    


 


Macrocytosis  Not Reportable   07/26/22  04:16    


 


Spherocytes  Not Reportable   07/26/22  04:16    


 


Pappenheimer Bodies  Not Reportable   07/26/22  04:16    


 


Sickle Cells  Not Reportable   07/26/22  04:16    


 


Target Cells  Not Reportable   07/26/22  04:16    


 


Tear Drop Cells  Not Reportable   07/26/22  04:16    


 


Ovalocytes  Not Reportable   07/26/22  04:16    


 


Helmet Cells  Not Reportable   07/26/22  04:16    


 


Ge-Atwood Bodies  Not Reportable   07/26/22  04:16    


 


Cabot Rings  Not Reportable   07/26/22  04:16    


 


Eugene Cells  Not Reportable   07/26/22  04:16    


 


Bite Cells  Not Reportable   07/26/22  04:16    


 


Crenated Cell  Not Reportable   07/26/22  04:16    


 


Elliptocytes  Not Reportable   07/26/22  04:16    


 


Acanthocytes (Spur)  Not Reportable   07/26/22  04:16    


 


Rouleaux  Not Reportable   07/26/22  04:16    


 


Hemoglobin C Crystals  Not Reportable   07/26/22  04:16    


 


Schistocytes  Not Reportable   07/26/22  04:16    


 


Malaria parasites  Not Reportable   07/26/22  04:16    


 


Justin Bodies  Not Reportable   07/26/22  04:16    


 


Hem Pathologist Commnt  No   07/26/22  04:16    


 


Sodium  128 mmol/L (137-145)  L  07/27/22  04:00    


 


Potassium  5.1 mmol/L (3.6-5.0)  H  07/27/22  04:00    


 


Chloride  88.0 mmol/L ()  L  07/27/22  04:00    


 


Carbon Dioxide  24 mmol/L (22-30)   07/27/22  04:00    


 


Anion Gap  21 mmol/L  07/27/22  04:00    


 


BUN  84 mg/dL (9-20)  H  07/27/22  04:00    


 


Creatinine  13.5 mg/dL (0.8-1.3)  H  07/27/22  04:00    


 


Estimated GFR  5 ml/min  07/27/22  04:00    


 


BUN/Creatinine Ratio  6 %  07/27/22  04:00    


 


Glucose  139 mg/dL ()  H  07/27/22  04:00    


 


POC Glucose  180 mg/dL ()  H  07/27/22  10:18    


 


Calcium  7.9 mg/dL (8.4-10.2)  L  07/27/22  04:00    


 


Phosphorus  7.40 mg/dL (2.5-4.5)  H  07/27/22  04:00    


 


Magnesium  1.50 mg/dL (1.7-2.3)  L  07/27/22  04:00    


 


Total Bilirubin  0.70 mg/dL (0.1-1.2)   07/27/22  04:00    


 


AST  65 units/L (5-40)  H  07/27/22  04:00    


 


ALT  36 units/L (7-56)   07/27/22  04:00    


 


Alkaline Phosphatase  129 units/L ()   07/27/22  04:00    


 


NT-Pro-B Natriuret Pep  25009 pg/mL (0-450)  H  07/26/22  04:16    


 


Total Protein  6.5 g/dL (6.3-8.2)   07/27/22  04:00    


 


Albumin  2.6 g/dL (3.9-5)  L  07/27/22  04:00    


 


Albumin/Globulin Ratio  0.7 %  07/27/22  04:00    


 


Hepatitis A IgM Ab  Non-reactive  (NonReactive)   07/26/22  11:10    


 


Hep Bs Antigen  Non-reactive  (Negative)   07/26/22  11:10    


 


Hep B Core IgM Ab  Non-reactive  (NonReactive)   07/26/22  11:10    


 


Hepatitis C Antibody  Non-reactive  (NonReactive)   07/26/22  11:10    











Microbiology: 


Microbiology





07/26/22 18:00   Peripheral/Venous   Blood Culture - Preliminary


                            Culture in Progress


07/26/22 18:00   Peripheral/Venous   Blood Culture - Preliminary


                            Culture in Progress











Active Medications





- Current Medications


Current Medications: 














Generic Name Dose Route Start Last Admin





  Trade Name Freq  PRN Reason Stop Dose Admin


 


Acetaminophen  650 mg  07/25/22 16:54  07/27/22 09:52





  Acetaminophen 325 Mg Tab  PO   650 mg





  Q4H PRN   Administration





  Pain MILD(1-3)/Fever >100.5/HA  


 


Amlodipine Besylate  5 mg  07/27/22 10:00  07/27/22 09:36





  Amlodipine 5 Mg Tab  PO   5 mg





  QDAY LYNDA   Administration


 


Aspirin  325 mg  07/26/22 10:00  07/27/22 09:34





  Aspirin 325 Mg Tab  PO   325 mg





  QDAY LYNDA   Administration


 


Dextrose  50 ml  07/26/22 10:45 





  Dextrose 50% In Water (25gm) 50 Ml Syringe  IV  





  Q30MIN PRN  





  Hypoglycemia  





  Protocol  


 


Glucagon  1 mg  07/25/22 15:39  07/25/22 16:08





  Glucagon (Human Recombinant) 1 Mg/Ml Inj  IV   1 mg





  Q1H PRN   Administration





  Hypoglycemia  


 


Hydralazine HCl  10 mg  07/25/22 18:41 





  Hydralazine 20 Mg/1 Ml Inj  IV  





  Q2H PRN  





  Blood Pressure  


 


Sodium Chloride  100 mls @ 999 mls/hr  07/26/22 07:08 





  Nacl 0.9%  IV  





  CASSIDY PRN  





  Hypotension  


 


Dextrose  1,000 mls @ 50 mls/hr  07/26/22 14:00  07/27/22 11:14





  D10w  IV   50 mls/hr





  AS DIRECT LYNDA   Administration


 


Amiodarone HCl 900 mg/  500 mls @ 33.333 mls/hr  07/27/22 11:15  07/27/22 11:58





  Dextrose  IV   1 mg/min





  AS DIRECT LYNDA   33.333 mls/hr





    Administration





  Protocol  





  1 MG/MIN  


 


Heparin Sodium/Sodium Chloride  25,000 unit in 500 mls @ 30 mls/hr  07/27/22 

12:00 





  Heparin/ 0.45% Nacl-25,000 Unit/500 Ml  IV  





  TITR LYNDA  





  Protocol  





  1,500 UNITS/HR  


 


Ceftriaxone Sodium  2 gm in 100 mls @ 200 mls/hr  07/27/22 12:00 





  Rocephin/Ns 2 Gm/100 Ml  IV  





  Q24H LYNDA  





  Protocol  


 


Morphine Sulfate  2 mg  07/25/22 16:54  07/27/22 08:21





  Morphine 2 Mg/1 Ml Inj  IV   2 mg





  Q4H PRN   Administration





  Pain, Moderate (4-6)  


 


Ondansetron HCl  4 mg  07/27/22 12:00 





  Ondansetron 4 Mg/2 Ml Inj  IV  





  Q6H PRN  





  Nausea And Vomiting  


 


Oxycodone/Acetaminophen  1 tab  07/25/22 16:54  07/27/22 10:21





  Oxycodone /Acetaminophen 5-325mg Tab  PO   1 tab





  Q6H PRN   Administration





  Pain, Moderate (4-6)  


 


Sodium Chloride  10 ml  07/25/22 22:00  07/27/22 09:38





  Sodium Chloride 0.9% 10 Ml Flush Syringe  IV   10 ml





  BID LYNDA   Administration


 


Sodium Chloride  10 ml  07/25/22 16:54 





  Sodium Chloride 0.9% 10 Ml Flush Syringe  IV  





  PRN PRN  





  LINE FLUSH

## 2022-07-27 NOTE — PROGRESS NOTE
Assessment and Plan





- Patient Problems


(1) CARLEY (acute kidney injury)


Current Visit: Yes   Status: Acute   


Plan to address problem: 


given refractory hyperkalemia and metabolic acidosis, initiated HD via vascath 

on 7/26/22. plan for daily HD x 3 days for correction of solute clearance, 

hyperkalemia, metabolic acidosis.  renal US showed echogenic kidneys ch

aracteristic for medical renal disease, no hydronephrosis seen. 


avoid nephrotoxins, NSAIDs, IV contrast. Will monitor lytes and renal parameters

closely and make further recommendations








(2) Hyperkalemia


Current Visit: Yes   Status: Acute   


Plan to address problem: 


persistent hyperkalemia s/p medical treatment, to be corrected with daily HD. 

cont 2g K renal diet 








(3) CHF (congestive heart failure), NYHA class II


Current Visit: Yes   Status: Chronic   


Qualifiers: 


   Congestive heart failure chronicity: chronic 


Plan to address problem: 


volume control with HD. Echo showed LVEF 20 to 25% moderate concentric LVH.  

Right ventricle is dilated.  Right ventricle hypokinetic.  Left atrium is 

severely dilated.  Right atrium dilated. follow cardiology recommendations 








(4) Hyponatremia


Current Visit: Yes   Status: Acute   


Plan to address problem: 


likely hypervolemic hyponatremia, to be corrected with volume control via HD 








(5) Atrial fibrillation with RVR


Current Visit: No   Status: Acute   





Subjective


Date of service: 07/27/22


Principal diagnosis: CARLEY 


Interval history: 





patient seen in the ICU, tolerated HD well yesterday. no overnight events 

reported. 





Objective





- Vital Signs


Vital signs: 


                               Vital Signs - 12hr











  07/26/22 07/26/22 07/26/22





  22:46 23:00 23:02


 


Temperature   


 


Pulse Rate 146 H 164 H 163 H


 


Pulse Rate [   





From Monitor]   


 


Respiratory 18 18 22





Rate   


 


Blood Pressure 125/92 116/78 116/78


 


O2 Sat by Pulse 100 99 97





Oximetry   














  07/26/22 07/26/22 07/26/22





  23:15 23:30 23:45


 


Temperature   


 


Pulse Rate 162 H 145 H 145 H


 


Pulse Rate [   





From Monitor]   


 


Respiratory 18 17 18





Rate   


 


Blood Pressure 109/72 114/84 113/86


 


O2 Sat by Pulse 95 96 95





Oximetry   














  07/26/22 07/26/22 07/27/22





  23:56 23:58 00:00


 


Temperature 99.9 F H  


 


Pulse Rate 151 H  158 H


 


Pulse Rate [  157 H 





From Monitor]   


 


Respiratory  19 18





Rate   


 


Blood Pressure   116/88


 


O2 Sat by Pulse  96 98





Oximetry   














  07/27/22 07/27/22 07/27/22





  00:15 00:30 00:46


 


Temperature   


 


Pulse Rate 160 H 140 H 149 H


 


Pulse Rate [   





From Monitor]   


 


Respiratory 18 19 20





Rate   


 


Blood Pressure 122/82 105/76 114/72


 


O2 Sat by Pulse 97 97 97





Oximetry   














  07/27/22 07/27/22 07/27/22





  01:00 01:15 01:30


 


Temperature   


 


Pulse Rate 146 H 149 H 134 H


 


Pulse Rate [   





From Monitor]   


 


Respiratory 15 20 20





Rate   


 


Blood Pressure 114/72 133/79 141/81


 


O2 Sat by Pulse 96 98 99





Oximetry   














  07/27/22 07/27/22 07/27/22





  01:46 02:00 02:16


 


Temperature   


 


Pulse Rate 136 H 136 H 153 H


 


Pulse Rate [   





From Monitor]   


 


Respiratory 20 19 20





Rate   


 


Blood Pressure 126/81 122/75 113/66


 


O2 Sat by Pulse 97 97 96





Oximetry   














  07/27/22 07/27/22 07/27/22





  02:30 02:45 03:00


 


Temperature   


 


Pulse Rate 149 H 133 H 148 H


 


Pulse Rate [   





From Monitor]   


 


Respiratory 19 16 15





Rate   


 


Blood Pressure 113/66 116/71 114/80


 


O2 Sat by Pulse 96 99 96





Oximetry   














  07/27/22 07/27/22 07/27/22





  03:15 03:30 03:45


 


Temperature   


 


Pulse Rate 148 H 147 H 124 H


 


Pulse Rate [   





From Monitor]   


 


Respiratory 16 17 13





Rate   


 


Blood Pressure 116/65 107/78 114/74


 


O2 Sat by Pulse 96 96 95





Oximetry   














  07/27/22 07/27/22 07/27/22





  03:57 04:00 04:15


 


Temperature 99.1 F  


 


Pulse Rate 137 H 135 H 137 H


 


Pulse Rate [ 157 H  





From Monitor]   


 


Respiratory 19 15 17





Rate   


 


Blood Pressure  112/72 96/72


 


O2 Sat by Pulse 96 94 93





Oximetry   














  07/27/22 07/27/22 07/27/22





  04:30 04:46 05:00


 


Temperature   


 


Pulse Rate 139 H 155 H 144 H


 


Pulse Rate [   





From Monitor]   


 


Respiratory 16 16 20





Rate   


 


Blood Pressure 100/70 120/78 102/75


 


O2 Sat by Pulse 93 94 92





Oximetry   














  07/27/22 07/27/22 07/27/22





  05:15 05:30 05:45


 


Temperature   


 


Pulse Rate 128 H 143 H 140 H


 


Pulse Rate [   





From Monitor]   


 


Respiratory 16 17 15





Rate   


 


Blood Pressure 107/83 107/83 115/62


 


O2 Sat by Pulse 93 94 93





Oximetry   














  07/27/22 07/27/22 07/27/22





  06:00 06:15 06:30


 


Temperature   


 


Pulse Rate 153 H 144 H 161 H


 


Pulse Rate [   





From Monitor]   


 


Respiratory 16 16 13





Rate   


 


Blood Pressure 115/62 117/69 122/80


 


O2 Sat by Pulse 93 93 97





Oximetry   














  07/27/22 07/27/22 07/27/22





  06:46 07:00 07:15


 


Temperature   


 


Pulse Rate 150 H 140 H 143 H


 


Pulse Rate [   





From Monitor]   


 


Respiratory 15 16 14





Rate   


 


Blood Pressure 121/86 112/88 115/87


 


O2 Sat by Pulse 95 98 98





Oximetry   














  07/27/22 07/27/22 07/27/22





  07:30 07:45 08:00


 


Temperature   99.9 F H


 


Pulse Rate 153 H 127 H 123 H


 


Pulse Rate [   125 H





From Monitor]   


 


Respiratory 14 13 16





Rate   


 


Blood Pressure 113/77 115/82 126/99


 


O2 Sat by Pulse 95 95 95





Oximetry   














  07/27/22 07/27/22 07/27/22





  08:15 08:21 08:30


 


Temperature   


 


Pulse Rate 139 H  136 H


 


Pulse Rate [   





From Monitor]   


 


Respiratory 19 19 15





Rate   


 


Blood Pressure 125/98  125/98


 


O2 Sat by Pulse 96  96





Oximetry   














  07/27/22 07/27/22 07/27/22





  08:45 08:51 09:00


 


Temperature   


 


Pulse Rate 136 H  159 H


 


Pulse Rate [   





From Monitor]   


 


Respiratory 14 19 13





Rate   


 


Blood Pressure 131/90  131/96


 


O2 Sat by Pulse 97  96





Oximetry   














  07/27/22 07/27/22 07/27/22





  09:16 09:30 09:36


 


Temperature   


 


Pulse Rate 132 H 148 H 144 H


 


Pulse Rate [   





From Monitor]   


 


Respiratory 20 18 





Rate   


 


Blood Pressure 137/87 144/100 144/100


 


O2 Sat by Pulse 97 96 





Oximetry   














  07/27/22 07/27/22 07/27/22





  09:46 09:52 10:00


 


Temperature   


 


Pulse Rate 140 H  141 H


 


Pulse Rate [   





From Monitor]   


 


Respiratory 16 16 15





Rate   


 


Blood Pressure 140/74  130/94


 


O2 Sat by Pulse 94  94





Oximetry   














  07/27/22





  10:21


 


Temperature 


 


Pulse Rate 


 


Pulse Rate [ 





From Monitor] 


 


Respiratory 19





Rate 


 


Blood Pressure 


 


O2 Sat by Pulse 





Oximetry 














- General Appearance


General appearance: well-developed, well-nourished, appears stated age, obese


EENT: ATNC, PERRL, mucous membranes moist


Neck: no JVD


Respiratory: Present: Clear to Ascultation


Cardiology: regular, S1S2


Gastrointestinal: normoactive bowel sounds


Integumentary: no rash


Neurologic: no focal deficit, alert and oriented x3, strength 5/5, CN 3-12 

intact


Psychiatric: mood/affect appropriate, cooperative





- Lab





                                07/27/22 Unknown





                                 07/27/22 04:00


                             Most recent lab results











Calcium  7.9 mg/dL (8.4-10.2)  L  07/27/22  04:00    


 


Phosphorus  7.40 mg/dL (2.5-4.5)  H  07/27/22  04:00    


 


Magnesium  1.50 mg/dL (1.7-2.3)  L  07/27/22  04:00    














Medications & Allergies





- Medications


Allergies/Adverse Reactions: 


                                    Allergies





adhesive Allergy (Verified 07/25/22 14:07)


   Rash


ibuprofen [From Motrin] Allergy (Verified 07/25/22 14:07)


   Swelling


lisinopril Allergy (Verified 07/25/22 14:07)


   COUGH


metoprolol Allergy (Verified 07/25/22 14:07)


   Nausea


amiodarone Adverse Reaction (Verified 07/25/22 14:07)


   Nausea


Iodinated Contrast Media [Iodinated Contrast Media - IV Dye] Adverse Reaction (V

erified 07/25/22 14:07)


   KIDNEYS SHUT DOWN








Home Medications: 


                                Home Medications











 Medication  Instructions  Recorded  Confirmed  Last Taken  Type


 


Digoxin [Lanoxin] 0.25 mg PO DAILY@1700 #30 tablet 01/05/15 06/27/17 06/26/17 Rx


 


Aspirin 325 mg PO QDAY 06/27/17 06/27/17 06/26/17 History


 


Furosemide [Lasix TAB] 40 mg PO QDAY 06/27/17 06/27/17 06/26/17 History


 


Losartan [Cozaar] 50 mg PO QDAY 06/27/17 06/27/17 06/26/17 History


 


HYDROcodone/APAP 5-325 [[place] 1 each PO Q6HR PRN #20 tablet 02/27/20  Unknown Rx





5/325]     


 


Penicillin Vk [Veetids TAB] 500 mg PO QID #21 tablet 02/27/20  Unknown Rx


 


Acetaminophen [Acetaminophen TAB] 1,000 mg PO Q6HR #30 tablet 05/27/20  Unknown 

Rx


 


cephALEXin [Keflex] 500 mg PO Q8HR #30 cap 05/27/20  Unknown Rx











Active Medications: 














Generic Name Dose Route Start Last Admin





  Trade Name Freq  PRN Reason Stop Dose Admin


 


Acetaminophen  650 mg  07/25/22 16:54  07/27/22 09:52





  Acetaminophen 325 Mg Tab  PO   650 mg





  Q4H PRN   Administration





  Pain MILD(1-3)/Fever >100.5/HA  


 


Amlodipine Besylate  5 mg  07/27/22 10:00  07/27/22 09:36





  Amlodipine 5 Mg Tab  PO   5 mg





  QDAY LYNDA   Administration


 


Aspirin  325 mg  07/26/22 10:00  07/27/22 09:34





  Aspirin 325 Mg Tab  PO   325 mg





  QDAY LYNDA   Administration


 


Dextrose  50 ml  07/26/22 10:45 





  Dextrose 50% In Water (25gm) 50 Ml Syringe  IV  





  Q30MIN PRN  





  Hypoglycemia  





  Protocol  


 


Glucagon  1 mg  07/25/22 15:39  07/25/22 16:08





  Glucagon (Human Recombinant) 1 Mg/Ml Inj  IV   1 mg





  Q1H PRN   Administration





  Hypoglycemia  


 


Heparin Sodium (Porcine)  5,000 unit  07/25/22 22:00  07/27/22 09:36





  Heparin 5,000 Unit/1 Ml Vial  SUB-Q   5,000 unit





  Q12HR LYNDA   Administration


 


Hydralazine HCl  10 mg  07/25/22 18:41 





  Hydralazine 20 Mg/1 Ml Inj  IV  





  Q2H PRN  





  Blood Pressure  


 


Sodium Chloride  100 mls @ 999 mls/hr  07/26/22 07:08 





  Nacl 0.9%  IV  





  CASSIDY PRN  





  Hypotension  


 


Dextrose  1,000 mls @ 75 mls/hr  07/26/22 14:00  07/26/22 23:12





  D10w  IV   75 mls/hr





  AS DIRECT LYNDA   Administration


 


Magnesium Sulfate  2 gm in 50 mls @ 25 mls/hr  07/27/22 08:00  07/27/22 08:19





  Magnesium Sulfate 2gm/50ml  IV  07/27/22 12:00  25 mls/hr





  ONCE@0800 LYNDA   Administration


 


Morphine Sulfate  2 mg  07/25/22 16:54  07/27/22 08:21





  Morphine 2 Mg/1 Ml Inj  IV   2 mg





  Q4H PRN   Administration





  Pain, Moderate (4-6)  


 


Ondansetron HCl  4 mg  07/25/22 16:54  07/27/22 08:14





  Ondansetron 4 Mg/2 Ml Inj  IV   4 mg





  Q8H PRN   Administration





  Nausea And Vomiting  


 


Oxycodone/Acetaminophen  1 tab  07/25/22 16:54  07/27/22 10:21





  Oxycodone /Acetaminophen 5-325mg Tab  PO   1 tab





  Q6H PRN   Administration





  Pain, Moderate (4-6)  


 


Sodium Chloride  10 ml  07/25/22 22:00  07/27/22 09:38





  Sodium Chloride 0.9% 10 Ml Flush Syringe  IV   10 ml





  BID LYNDA   Administration


 


Sodium Chloride  10 ml  07/25/22 16:54 





  Sodium Chloride 0.9% 10 Ml Flush Syringe  IV  





  PRN PRN  





  LINE FLUSH

## 2022-07-27 NOTE — PROGRESS NOTE
Assessment and Plan





41 y/o male with hyperkalemia, secondary to acute renal failure of unknown 

etiology with hypoglycemia likely secondary to oral medications not fully being 

cleared secondary to renal impairment.





7/27/22:  Bladder scan showed no urine in bladder so will hold on joyce 

placement.  HD per renal for 3 days straight so again tomorrow.  Patient still 

on D10 at 75 and highest Blood sugar has been 195.  Will continue for now but 

will drop down to 50.  Has a diet.  Likely oral hypoglycemics are still in 

system.  If not improved after HD session tomorrow, may need to ask renal about 

further sessions to help with removal of active metabolites.  Given that no real

allergy to amio, cards will initiate today.  Weighed the risk and benefits and 

will start anticoagulation but with heparin as patient will likely need long 

term dialysis catheter placement.  Continue ICU care.  Prognosis still remains 

guarded.





1.  Renal-nephro already ordered HD and patient is on it.  Unsure if bladder 

scan was done but patient has no Joyce as he refused it in the ED.  Still would 

consider bladder scan and will discuss placement of joyce based on scan results.

 HD per renal.  Likely needs renal ultrasound as well.





2.  Endocrine-persistent hypoglycemia requiring D10 drip.  Once patient has 3 

consecutive sugars greater than 180 then would be ok with stopping D10.  Hopeful

HD will help to remove active metabolites of drugs that maybe adding to 

continued hypoglycemia.  If not improvement post HD, will need further work up. 

Ok with feeding patient as long as he remains alert and can protect his airway. 

Continue q1 hour finger sticks





3.  Cardiovascular-tachycardic.  Questionable afib.  Awaiting 12 lead EKG.  Hold

on therapy for right now.  Cards is following as well.  Stopped Dig given renal 

function.





4.  GI- ok with feeding patient, needs prophylaxis for ulcers.





5.  Neuro-some confusion on questioning but for the most part stable, will cont

inue to monitor





6.  Guarded prognosis.





CCt 31 minutes.











Subjective


Date of service: 07/27/22


Principal diagnosis: CARLEY 


Interval history: 





No acute events overnight.  Tolerated HD on yesterday.  Currently undergoing HD 

now.  K is better, BUN is better.  Rate is still not controlled.  Allergy to 

Amio is not a true allergy, only nausea.  





Objective





- Constitutional


Vitals: 


                               Vital Signs - 12hr











  07/26/22 07/26/22 07/27/22





  23:56 23:58 00:00


 


Temperature 99.9 F H  


 


Pulse Rate 151 H  158 H


 


Pulse Rate [  157 H 





From Monitor]   


 


Respiratory  19 18





Rate   


 


Blood Pressure   116/88


 


O2 Sat by Pulse  96 98





Oximetry   


 


O2 Sat by Pulse   





Oximetry [   





Anterior   





Bilateral   





Throughout]   














  07/27/22 07/27/22 07/27/22





  00:15 00:30 00:46


 


Temperature   


 


Pulse Rate 160 H 140 H 149 H


 


Pulse Rate [   





From Monitor]   


 


Respiratory 18 19 20





Rate   


 


Blood Pressure 122/82 105/76 114/72


 


O2 Sat by Pulse 97 97 97





Oximetry   


 


O2 Sat by Pulse   





Oximetry [   





Anterior   





Bilateral   





Throughout]   














  07/27/22 07/27/22 07/27/22





  01:00 01:15 01:30


 


Temperature   


 


Pulse Rate 146 H 149 H 134 H


 


Pulse Rate [   





From Monitor]   


 


Respiratory 15 20 20





Rate   


 


Blood Pressure 114/72 133/79 141/81


 


O2 Sat by Pulse 96 98 99





Oximetry   


 


O2 Sat by Pulse   





Oximetry [   





Anterior   





Bilateral   





Throughout]   














  07/27/22 07/27/22 07/27/22





  01:46 02:00 02:16


 


Temperature   


 


Pulse Rate 136 H 136 H 153 H


 


Pulse Rate [   





From Monitor]   


 


Respiratory 20 19 20





Rate   


 


Blood Pressure 126/81 122/75 113/66


 


O2 Sat by Pulse 97 97 96





Oximetry   


 


O2 Sat by Pulse   





Oximetry [   





Anterior   





Bilateral   





Throughout]   














  07/27/22 07/27/22 07/27/22





  02:30 02:45 03:00


 


Temperature   


 


Pulse Rate 149 H 133 H 148 H


 


Pulse Rate [   





From Monitor]   


 


Respiratory 19 16 15





Rate   


 


Blood Pressure 113/66 116/71 114/80


 


O2 Sat by Pulse 96 99 96





Oximetry   


 


O2 Sat by Pulse   





Oximetry [   





Anterior   





Bilateral   





Throughout]   














  07/27/22 07/27/22 07/27/22





  03:15 03:30 03:45


 


Temperature   


 


Pulse Rate 148 H 147 H 124 H


 


Pulse Rate [   





From Monitor]   


 


Respiratory 16 17 13





Rate   


 


Blood Pressure 116/65 107/78 114/74


 


O2 Sat by Pulse 96 96 95





Oximetry   


 


O2 Sat by Pulse   





Oximetry [   





Anterior   





Bilateral   





Throughout]   














  07/27/22 07/27/22 07/27/22





  03:57 04:00 04:15


 


Temperature 99.1 F  


 


Pulse Rate 137 H 135 H 137 H


 


Pulse Rate [ 157 H  





From Monitor]   


 


Respiratory 19 15 17





Rate   


 


Blood Pressure  112/72 96/72


 


O2 Sat by Pulse 96 94 93





Oximetry   


 


O2 Sat by Pulse   





Oximetry [   





Anterior   





Bilateral   





Throughout]   














  07/27/22 07/27/22 07/27/22





  04:30 04:46 05:00


 


Temperature   


 


Pulse Rate 139 H 155 H 144 H


 


Pulse Rate [   





From Monitor]   


 


Respiratory 16 16 20





Rate   


 


Blood Pressure 100/70 120/78 102/75


 


O2 Sat by Pulse 93 94 92





Oximetry   


 


O2 Sat by Pulse   





Oximetry [   





Anterior   





Bilateral   





Throughout]   














  07/27/22 07/27/22 07/27/22





  05:15 05:30 05:45


 


Temperature   


 


Pulse Rate 128 H 143 H 140 H


 


Pulse Rate [   





From Monitor]   


 


Respiratory 16 17 15





Rate   


 


Blood Pressure 107/83 107/83 115/62


 


O2 Sat by Pulse 93 94 93





Oximetry   


 


O2 Sat by Pulse   





Oximetry [   





Anterior   





Bilateral   





Throughout]   














  07/27/22 07/27/22 07/27/22





  06:00 06:15 06:30


 


Temperature   


 


Pulse Rate 153 H 144 H 161 H


 


Pulse Rate [   





From Monitor]   


 


Respiratory 16 16 13





Rate   


 


Blood Pressure 115/62 117/69 122/80


 


O2 Sat by Pulse 93 93 97





Oximetry   


 


O2 Sat by Pulse   





Oximetry [   





Anterior   





Bilateral   





Throughout]   














  07/27/22 07/27/22 07/27/22





  06:46 07:00 07:15


 


Temperature   


 


Pulse Rate 150 H 140 H 143 H


 


Pulse Rate [   





From Monitor]   


 


Respiratory 15 16 14





Rate   


 


Blood Pressure 121/86 112/88 115/87


 


O2 Sat by Pulse 95 98 98





Oximetry   


 


O2 Sat by Pulse   





Oximetry [   





Anterior   





Bilateral   





Throughout]   














  07/27/22 07/27/22 07/27/22





  07:30 07:45 08:00


 


Temperature   99.9 F H


 


Pulse Rate 153 H 127 H 123 H


 


Pulse Rate [   125 H





From Monitor]   


 


Respiratory 14 13 16





Rate   


 


Blood Pressure 113/77 115/82 126/99


 


O2 Sat by Pulse 95 95 95





Oximetry   


 


O2 Sat by Pulse   





Oximetry [   





Anterior   





Bilateral   





Throughout]   














  07/27/22 07/27/22 07/27/22





  08:15 08:21 08:30


 


Temperature   


 


Pulse Rate 139 H  136 H


 


Pulse Rate [   





From Monitor]   


 


Respiratory 19 19 15





Rate   


 


Blood Pressure 125/98  125/98


 


O2 Sat by Pulse 96  96





Oximetry   


 


O2 Sat by Pulse   





Oximetry [   





Anterior   





Bilateral   





Throughout]   














  07/27/22 07/27/22 07/27/22





  08:45 08:51 09:00


 


Temperature   


 


Pulse Rate 136 H  159 H


 


Pulse Rate [   





From Monitor]   


 


Respiratory 14 19 13





Rate   


 


Blood Pressure 131/90  131/96


 


O2 Sat by Pulse 97  96





Oximetry   


 


O2 Sat by Pulse   





Oximetry [   





Anterior   





Bilateral   





Throughout]   














  07/27/22 07/27/22 07/27/22





  09:16 09:30 09:36


 


Temperature   


 


Pulse Rate 132 H 148 H 144 H


 


Pulse Rate [   





From Monitor]   


 


Respiratory 20 18 





Rate   


 


Blood Pressure 137/87 144/100 144/100


 


O2 Sat by Pulse 97 96 





Oximetry   


 


O2 Sat by Pulse   





Oximetry [   





Anterior   





Bilateral   





Throughout]   














  07/27/22 07/27/22 07/27/22





  09:40 09:46 09:52


 


Temperature 99.9 F H  


 


Pulse Rate 122 H 140 H 


 


Pulse Rate [   





From Monitor]   


 


Respiratory 20 16 16





Rate   


 


Blood Pressure 130/94 140/74 


 


O2 Sat by Pulse  94 





Oximetry   


 


O2 Sat by Pulse 96  





Oximetry [   





Anterior   





Bilateral   





Throughout]   














  07/27/22 07/27/22 07/27/22





  10:00 10:10 10:15


 


Temperature   


 


Pulse Rate 141 H 122 H 169 H


 


Pulse Rate [   





From Monitor]   


 


Respiratory 15  17





Rate   


 


Blood Pressure 130/94 130/94 136/93


 


O2 Sat by Pulse 94  96





Oximetry   


 


O2 Sat by Pulse   





Oximetry [   





Anterior   





Bilateral   





Throughout]   














  07/27/22 07/27/22 07/27/22





  10:21 10:30 10:45


 


Temperature   


 


Pulse Rate  145 H 144 H


 


Pulse Rate [   





From Monitor]   


 


Respiratory 19 20 16





Rate   


 


Blood Pressure  137/89 130/82


 


O2 Sat by Pulse  96 97





Oximetry   


 


O2 Sat by Pulse   





Oximetry [   





Anterior   





Bilateral   





Throughout]   














  07/27/22 07/27/22 07/27/22





  10:46 11:00 11:15


 


Temperature   


 


Pulse Rate 147 H 136 H 138 H


 


Pulse Rate [   





From Monitor]   


 


Respiratory  15 





Rate   


 


Blood Pressure 130/82 124/90 114/79


 


O2 Sat by Pulse  97 





Oximetry   


 


O2 Sat by Pulse   





Oximetry [   





Anterior   





Bilateral   





Throughout]   














  07/27/22 07/27/22





  11:30 11:45


 


Temperature  


 


Pulse Rate 135 H 133 H


 


Pulse Rate [  





From Monitor]  


 


Respiratory  





Rate  


 


Blood Pressure 121/76 124/74


 


O2 Sat by Pulse  





Oximetry  


 


O2 Sat by Pulse  





Oximetry [  





Anterior  





Bilateral  





Throughout]  














- Labs


CBC & Chem 7: 


                                07/27/22 Unknown





                                 07/27/22 04:00


Labs: 


                              Abnormal lab results











  07/26/22 07/26/22 07/26/22 Range/Units





  11:27 16:15 21:00 


 


WBC    16.8 H  (4.5-11.0)  K/mm3


 


Sodium   131 L   (137-145)  mmol/L


 


Potassium  5.4 H D    (3.6-5.0)  mmol/L


 


Chloride   91.1 L   ()  mmol/L


 


Carbon Dioxide  21 L D    (22-30)  mmol/L


 


BUN   79 H   (9-20)  mg/dL


 


Creatinine  13.6 H  12.1 H   (0.8-1.3)  mg/dL


 


Glucose     ()  mg/dL


 


POC Glucose     ()  mg/dL


 


Calcium   7.9 L   (8.4-10.2)  mg/dL


 


Phosphorus     (2.5-4.5)  mg/dL


 


Magnesium     (1.7-2.3)  mg/dL


 


AST     (5-40)  units/L


 


Albumin     (3.9-5)  g/dL














  07/26/22 07/26/22 07/26/22 Range/Units





  21:00 23:57 Unknown 


 


WBC     (4.5-11.0)  K/mm3


 


Sodium  129 L   129 L  (137-145)  mmol/L


 


Potassium    7.5 H* D  (3.6-5.0)  mmol/L


 


Chloride  89.7 L   91.8 L  ()  mmol/L


 


Carbon Dioxide    13 L D  (22-30)  mmol/L


 


BUN  83 H   113 H  (9-20)  mg/dL


 


Creatinine  13.0 H   16.1 H  (0.8-1.3)  mg/dL


 


Glucose     ()  mg/dL


 


POC Glucose   112 H   ()  mg/dL


 


Calcium  7.9 L    (8.4-10.2)  mg/dL


 


Phosphorus     (2.5-4.5)  mg/dL


 


Magnesium     (1.7-2.3)  mg/dL


 


AST     (5-40)  units/L


 


Albumin     (3.9-5)  g/dL














  07/27/22 07/27/22 07/27/22 Range/Units





  01:04 02:00 04:00 


 


WBC     (4.5-11.0)  K/mm3


 


Sodium    128 L  (137-145)  mmol/L


 


Potassium    5.1 H  (3.6-5.0)  mmol/L


 


Chloride    88.0 L  ()  mmol/L


 


Carbon Dioxide     (22-30)  mmol/L


 


BUN    84 H  (9-20)  mg/dL


 


Creatinine    13.5 H  (0.8-1.3)  mg/dL


 


Glucose    139 H  ()  mg/dL


 


POC Glucose  117 H  127 H   ()  mg/dL


 


Calcium    7.9 L  (8.4-10.2)  mg/dL


 


Phosphorus    7.40 H  (2.5-4.5)  mg/dL


 


Magnesium    1.50 L  (1.7-2.3)  mg/dL


 


AST    65 H  (5-40)  units/L


 


Albumin    2.6 L  (3.9-5)  g/dL














  07/27/22 07/27/22 07/27/22 Range/Units





  05:43 08:34 10:18 


 


WBC     (4.5-11.0)  K/mm3


 


Sodium     (137-145)  mmol/L


 


Potassium     (3.6-5.0)  mmol/L


 


Chloride     ()  mmol/L


 


Carbon Dioxide     (22-30)  mmol/L


 


BUN     (9-20)  mg/dL


 


Creatinine     (0.8-1.3)  mg/dL


 


Glucose     ()  mg/dL


 


POC Glucose  138 H  195 H  180 H  ()  mg/dL


 


Calcium     (8.4-10.2)  mg/dL


 


Phosphorus     (2.5-4.5)  mg/dL


 


Magnesium     (1.7-2.3)  mg/dL


 


AST     (5-40)  units/L


 


Albumin     (3.9-5)  g/dL














  07/27/22 Range/Units





  Unknown 


 


WBC  17.9 H  (4.5-11.0)  K/mm3


 


Sodium   (137-145)  mmol/L


 


Potassium   (3.6-5.0)  mmol/L


 


Chloride   ()  mmol/L


 


Carbon Dioxide   (22-30)  mmol/L


 


BUN   (9-20)  mg/dL


 


Creatinine   (0.8-1.3)  mg/dL


 


Glucose   ()  mg/dL


 


POC Glucose   ()  mg/dL


 


Calcium   (8.4-10.2)  mg/dL


 


Phosphorus   (2.5-4.5)  mg/dL


 


Magnesium   (1.7-2.3)  mg/dL


 


AST   (5-40)  units/L


 


Albumin   (3.9-5)  g/dL














Medications & Allergies





- Medications


Allergies/Adverse Reactions: 


                                    Allergies





adhesive Allergy (Verified 07/25/22 14:07)


   Rash


ibuprofen [From Motrin] Allergy (Verified 07/25/22 14:07)


   Swelling


lisinopril Allergy (Verified 07/25/22 14:07)


   COUGH


metoprolol Allergy (Verified 07/25/22 14:07)


   Nausea


amiodarone Adverse Reaction (Verified 07/25/22 14:07)


   Nausea


Iodinated Contrast Media [Iodinated Contrast Media - IV Dye] Adverse Reaction 

(Verified 07/25/22 14:07)


   KIDNEYS SHUT DOWN








Home Medications: 


                                Home Medications











 Medication  Instructions  Recorded  Confirmed  Last Taken  Type


 


Digoxin [Lanoxin] 0.25 mg PO DAILY@1700 #30 tablet 01/05/15 06/27/17 06/26/17 Rx


 


Aspirin 325 mg PO QDAY 06/27/17 06/27/17 06/26/17 History


 


Furosemide [Lasix TAB] 40 mg PO QDAY 06/27/17 06/27/17 06/26/17 History


 


Losartan [Cozaar] 50 mg PO QDAY 06/27/17 06/27/17 06/26/17 History


 


HYDROcodone/APAP 5-325 [[place] 1 each PO Q6HR PRN #20 tablet 02/27/20  Unknown Rx





5/325]     


 


Penicillin Vk [Veetids TAB] 500 mg PO QID #21 tablet 02/27/20  Unknown Rx


 


Acetaminophen [Acetaminophen TAB] 1,000 mg PO Q6HR #30 tablet 05/27/20  Unknown 

Rx


 


cephALEXin [Keflex] 500 mg PO Q8HR #30 cap 05/27/20  Unknown Rx











Active Medications: 














Generic Name Dose Route Start Last Admin





  Trade Name Freq  PRN Reason Stop Dose Admin


 


Acetaminophen  650 mg  07/25/22 16:54  07/27/22 09:52





  Acetaminophen 325 Mg Tab  PO   650 mg





  Q4H PRN   Administration





  Pain MILD(1-3)/Fever >100.5/HA  


 


Amlodipine Besylate  5 mg  07/27/22 10:00  07/27/22 09:36





  Amlodipine 5 Mg Tab  PO   5 mg





  QDAY LYNDA   Administration


 


Aspirin  325 mg  07/26/22 10:00  07/27/22 09:34





  Aspirin 325 Mg Tab  PO   325 mg





  QDAY LYNDA   Administration


 


Dextrose  50 ml  07/26/22 10:45 





  Dextrose 50% In Water (25gm) 50 Ml Syringe  IV  





  Q30MIN PRN  





  Hypoglycemia  





  Protocol  


 


Glucagon  1 mg  07/25/22 15:39  07/25/22 16:08





  Glucagon (Human Recombinant) 1 Mg/Ml Inj  IV   1 mg





  Q1H PRN   Administration





  Hypoglycemia  


 


Hydralazine HCl  10 mg  07/25/22 18:41 





  Hydralazine 20 Mg/1 Ml Inj  IV  





  Q2H PRN  





  Blood Pressure  


 


Sodium Chloride  100 mls @ 999 mls/hr  07/26/22 07:08 





  Nacl 0.9%  IV  





  CASSIDY PRN  





  Hypotension  


 


Dextrose  1,000 mls @ 50 mls/hr  07/26/22 14:00  07/27/22 11:14





  D10w  IV   50 mls/hr





  AS DIRECT LYNDA   Administration


 


Magnesium Sulfate  2 gm in 50 mls @ 25 mls/hr  07/27/22 08:00  07/27/22 08:19





  Magnesium Sulfate 2gm/50ml  IV  07/27/22 12:00  25 mls/hr





  ONCE@0800 LYNDA   Administration


 


Amiodarone HCl 75 mg/ Dextrose  98.5 mls @ 600 mls/hr  07/27/22 11:15  07/27/22 

11:49





  IV  07/27/22 12:15  600 mls/hr





  ONCE@1115 LYNDA   Administration


 


Amiodarone HCl 900 mg/  500 mls @ 33.333 mls/hr  07/27/22 11:15 





  Dextrose  IV  





  AS DIRECT LYNDA  





  Protocol  





  1 MG/MIN  


 


Heparin Sodium/Sodium Chloride  25,000 unit in 500 mls @ 30 mls/hr  07/27/22 

12:00 





  Heparin/ 0.45% Nacl-25,000 Unit/500 Ml  IV  





  TITR LYNDA  





  Protocol  





  1,500 UNITS/HR  


 


Ceftriaxone Sodium  2 gm in 100 mls @ 200 mls/hr  07/27/22 12:00 





  Rocephin/Ns 2 Gm/100 Ml  IV  





  Q24H LYNDA  





  Protocol  


 


Morphine Sulfate  2 mg  07/25/22 16:54  07/27/22 08:21





  Morphine 2 Mg/1 Ml Inj  IV   2 mg





  Q4H PRN   Administration





  Pain, Moderate (4-6)  


 


Ondansetron HCl  4 mg  07/27/22 12:00 





  Ondansetron 4 Mg/2 Ml Inj  IV  





  Q6H PRN  





  Nausea And Vomiting  


 


Oxycodone/Acetaminophen  1 tab  07/25/22 16:54  07/27/22 10:21





  Oxycodone /Acetaminophen 5-325mg Tab  PO   1 tab





  Q6H PRN   Administration





  Pain, Moderate (4-6)  


 


Sodium Chloride  10 ml  07/25/22 22:00  07/27/22 09:38





  Sodium Chloride 0.9% 10 Ml Flush Syringe  IV   10 ml





  BID LYNDA   Administration


 


Sodium Chloride  10 ml  07/25/22 16:54 





  Sodium Chloride 0.9% 10 Ml Flush Syringe  IV  





  PRN PRN  





  LINE FLUSH  














HEART Score





- HEART Score


Age: 45-65


Risk factors: > 3 risk factors or hx of atherosclerotic disease


Troponin: 1-3x normal limit





- Critical Actions


Critical Actions: 4-6 pts:12-16.6% risk of adverse cardiac event. Should be 

admitted

## 2022-07-27 NOTE — PROGRESS NOTE
Assessment and Plan





Patient is a 42-year-old male with a past medical history of HFrEF, nonischemic 

cardiomyopathy, chronic A. fib, hypertension, COPD, diabetes, history of CVA in 

January 2021 with left-sided hemiplegia and bedridden who came to the ED for 

complaint of weakness and hypoglycemia





Acute renal failure-nephrology following


Altered mental status


Acute on chronic heart failure


Hyperkalemia


Hyponatremia


Hypoglycemia


Hypertension


Cardiomyopathy


Diabetes


History of CVA


A. fib





Echo 11/8/2020-the study was technically difficult in quality.  Arrhythmia was 

noted during the study LVEF is moderately decreased 35-40%. Regional wall motion

abnormalities noted RV moderately dilated. RV systolic function is moderately 

reduced. RVSP is mildly elevated No pericardial effusion No definite or 

significant change in the EF from 10/15/2018 Consider an infiltrative 

cardiomyopathy such as amyloid


Echo 7/26/2022-EF 20 to 25% moderate concentric LVH.  Right ventricle is 

dilated.  Right ventricle hypokinetic.  Left atrium is severely dilated.  Right 

atrium dilated


Per documentation outpatient medications: Losartan 50 mg p.o. daily Lasix 40 mg 

p.o. daily, digoxin 0.25 mg p.o. daily, asa





Plan:


EKG shows A. fib with RVR rate 127 old anterolateral infarct.  No acute ischemic

change


Telemetry reviewed patient appears to be in A. fib rate 140s


Patient's documented allergy to amiodarone does not appear to be a true allergy 

will initiate half amnio bolus and drip 


Per conversation with staff patient reports being on Eliquis as an outpatient 

for anticoagulation however due to patient possibly needing permacath placement 

patient to be anticoagulated with heparin drip at this time


Due to renal function digoxin currently being held


BNP noted to be elevated patient appears hypervolemic on exam however due to 

renal function will defer to nephrology recommendations for volume management


No ACE/ARB due to renal function





Patient seen in conjunction with Dr. Reid who agrees with plan of care


30 minutes critical care time spent care coordination.








- Patient Problems


(1) CARLEY (acute kidney injury)


Current Visit: Yes   Status: Acute   





(2) Hyperkalemia


Current Visit: Yes   Status: Acute   





(3) Hypoglycemia


Current Visit: Yes   Status: Acute   





(4) Hyponatremia


Current Visit: Yes   Status: Acute   





(5) Acute on chronic systolic CHF (congestive heart failure)


Current Visit: No   Status: Acute   





(6) Atrial fibrillation with RVR


Current Visit: No   Status: Acute   





(7) COPD (chronic obstructive pulmonary disease)


Current Visit: No   Status: Acute   





(8) History of noncompliance with medical treatment


Current Visit: No   Status: Acute   





(9) Obesity


Current Visit: No   Status: Acute   





(10) Cardiomyopathy


Current Visit: No   Status: Chronic   





Subjective


Date of service: 07/27/22


Principal diagnosis: CARLEY 


Interval history: 





Patient resting in bed in no acute distress.  Patient more alert and oriented 

this a.m.


A. fib with RVR rate into the 140





Objective


                                   Vital Signs











  Temp Pulse Pulse Resp BP Pulse Ox Pulse Ox


 


 07/27/22 12:00   135 H    127/81  


 


 07/27/22 11:45   133 H    124/74  


 


 07/27/22 11:30   135 H    121/76  


 


 07/27/22 11:15   138 H    114/79  


 


 07/27/22 11:00   136 H   15  124/90  97 


 


 07/27/22 10:46   147 H    130/82  


 


 07/27/22 10:45   144 H   16  130/82  97 


 


 07/27/22 10:30   145 H   20  137/89  96 


 


 07/27/22 10:21     19   


 


 07/27/22 10:15   169 H   17  136/93  96 


 


 07/27/22 10:10   122 H    130/94  


 


 07/27/22 10:00   141 H   15  130/94  94 


 


 07/27/22 09:52     16   


 


 07/27/22 09:46   140 H   16  140/74  94 


 


 07/27/22 09:40  99.9 F H  122 H   20  130/94   96


 


 07/27/22 09:36   144 H    144/100  


 


 07/27/22 09:30   148 H   18  144/100  96 


 


 07/27/22 09:16   132 H   20  137/87  97 


 


 07/27/22 09:00   159 H   13  131/96  96 


 


 07/27/22 08:51     19   


 


 07/27/22 08:45   136 H   14  131/90  97 


 


 07/27/22 08:30   136 H   15  125/98  96 


 


 07/27/22 08:21     19   


 


 07/27/22 08:15   139 H   19  125/98  96 


 


 07/27/22 08:00  99.9 F H  123 H  125 H  16  126/99  95 


 


 07/27/22 07:45   127 H   13  115/82  95 


 


 07/27/22 07:30   153 H   14  113/77  95 


 


 07/27/22 07:15   143 H   14  115/87  98 


 


 07/27/22 07:00   140 H   16  112/88  98 


 


 07/27/22 06:46   150 H   15  121/86  95 


 


 07/27/22 06:30   161 H   13  122/80  97 


 


 07/27/22 06:15   144 H   16  117/69  93 


 


 07/27/22 06:00   153 H   16  115/62  93 


 


 07/27/22 05:45   140 H   15  115/62  93 


 


 07/27/22 05:30   143 H   17  107/83  94 


 


 07/27/22 05:15   128 H   16  107/83  93 


 


 07/27/22 05:00   144 H   20  102/75  92 


 


 07/27/22 04:46   155 H   16  120/78  94 


 


 07/27/22 04:30   139 H   16  100/70  93 


 


 07/27/22 04:15   137 H   17  96/72  93 


 


 07/27/22 04:00   135 H   15  112/72  94 


 


 07/27/22 03:57  99.1 F  137 H  157 H  19   96 


 


 07/27/22 03:45   124 H   13  114/74  95 


 


 07/27/22 03:30   147 H   17  107/78  96 


 


 07/27/22 03:15   148 H   16  116/65  96 


 


 07/27/22 03:00   148 H   15  114/80  96 


 


 07/27/22 02:45   133 H   16  116/71  99 


 


 07/27/22 02:30   149 H   19  113/66  96 


 


 07/27/22 02:16   153 H   20  113/66  96 


 


 07/27/22 02:00   136 H   19  122/75  97 


 


 07/27/22 01:46   136 H   20  126/81  97 


 


 07/27/22 01:30   134 H   20  141/81  99 


 


 07/27/22 01:15   149 H   20  133/79  98 


 


 07/27/22 01:00   146 H   15  114/72  96 


 


 07/27/22 00:46   149 H   20  114/72  97 


 


 07/27/22 00:30   140 H   19  105/76  97 


 


 07/27/22 00:15   160 H   18  122/82  97 


 


 07/27/22 00:00   158 H   18  116/88  98 


 


 07/26/22 23:58    157 H  19   96 


 


 07/26/22 23:56  99.9 F H  151 H     


 


 07/26/22 23:45   145 H   18  113/86  95 


 


 07/26/22 23:30   145 H   17  114/84  96 


 


 07/26/22 23:15   162 H   18  109/72  95 


 


 07/26/22 23:02   163 H   22  116/78  97 


 


 07/26/22 23:00   164 H   18  116/78  99 


 


 07/26/22 22:46   146 H   18  125/92  100 


 


 07/26/22 22:30   142 H   20  132/100  98 


 


 07/26/22 22:16   141 H   19  132/100  98 


 


 07/26/22 22:00   165 H   17  139/89  99 


 


 07/26/22 21:45   153 H   18  125/93  96 


 


 07/26/22 21:30   146 H   18  140/94  97 


 


 07/26/22 21:16   167 H   19  133/85  97 


 


 07/26/22 21:00   155 H   20  134/83  97 


 


 07/26/22 20:46   157 H   27 H  134/83  97 


 


 07/26/22 20:30   149 H   19  138/104  96 


 


 07/26/22 20:15   153 H   20  139/100  96 


 


 07/26/22 20:00  100 F H  140 H  157 H  19  134/99  96 


 


 07/26/22 19:46   134 H   19  125/97  96 


 


 07/26/22 19:30   140 H   19  118/74  96 


 


 07/26/22 19:10   137 H   22  130/64  94 


 


 07/26/22 19:00   148 H   20   93 


 


 07/26/22 18:50   144 H   22   94 


 


 07/26/22 18:41   147 H   22  139/108  94 


 


 07/26/22 18:31   132 H   23  139/108  96 


 


 07/26/22 18:21   134 H   24  139/108  92 


 


 07/26/22 18:11   145 H   20  139/108  95 


 


 07/26/22 18:01   128 H   22  139/108  97 


 


 07/26/22 17:51   122 H   18  139/108  96 


 


 07/26/22 17:41   131 H   24  133/69  94 


 


 07/26/22 17:31   128 H   20  133/69  97 


 


 07/26/22 17:21   127 H   18  127/79  98 


 


 07/26/22 17:11   144 H   21  147/77  98 


 


 07/26/22 17:01   119 H   21  147/77  98 


 


 07/26/22 16:51   147 H   18  131/82  100 


 


 07/26/22 16:41   132 H   22  140/88  99 


 


 07/26/22 16:32  99.2 F      


 


 07/26/22 16:31  101.5 F H      


 


 07/26/22 16:30   125 H   18  140/88  100 


 


 07/26/22 16:21   132 H   22  130/100  99 


 


 07/26/22 16:11   134 H   18  135/85  98 


 


 07/26/22 16:01   130 H   17  135/85  99 


 


 07/26/22 16:00    130 H  17   99 


 


 07/26/22 15:51   127 H   19  131/87  100 


 


 07/26/22 15:41   132 H   17  139/97  100 


 


 07/26/22 15:30   118 H   18  139/97  100 


 


 07/26/22 15:21   131 H   18  128/92  100 


 


 07/26/22 15:11   126 H   19  112/85  99 


 


 07/26/22 15:00   128 H   18  112/85  98 


 


 07/26/22 14:51   131 H   19  125/92  97 


 


 07/26/22 14:41   127 H   21  106/88  98 


 


 07/26/22 14:31   134 H   17  106/88  100 


 


 07/26/22 14:21   139 H   22  122/90  99 


 


 07/26/22 14:11   135 H   18  125/96  97 


 


 07/26/22 14:01   124 H   17  125/96  99 


 


 07/26/22 13:51   122 H   23  131/92  100 


 


 07/26/22 13:41   120 H   18  121/82  98 


 


 07/26/22 13:31   148 H   18  116/92  98 


 


 07/26/22 13:21   115 H   16  116/92  99 


 


 07/26/22 13:16  99.5 F  129 H   16  116/92  


 


 07/26/22 13:11   145 H   17  112/82  100 


 


 07/26/22 13:00   126 H   16  112/82  100 


 


 07/26/22 12:51   124 H   13  115/85  100 


 


 07/26/22 12:45   128 H    114/72  


 


 07/26/22 12:41   133 H   16  109/79  100 


 


 07/26/22 12:31   130 H   20  109/79  98 


 


 07/26/22 12:30   133 H    109/79  














- Physical Examination


General: No Apparent Distress


HEENT: Positive: PERRL


Neck: Positive: neck supple


Cardiac: Positive: irregularly irregular, Tachycardia


Lungs: Positive: Decreased Breath Sounds


Neuro: Positive: Grossly Intact


Abdomen: Positive: Soft


Skin: Negative: Rash, Suspicious Lesions, Ulceration


Extremities: Present: upper extr. pulses, edema





- Labs and Meds


                                 Cardiac Enzymes











  07/27/22 Range/Units





  04:00 


 


AST  65 H  (5-40)  units/L








                                       CBC











  07/26/22 07/27/22 Range/Units





  21:00 Unknown 


 


WBC  16.8 H  17.9 H  (4.5-11.0)  K/mm3


 


RBC  4.60  4.36  (3.65-5.03)  M/mm3


 


Hgb  13.7  12.9  (11.8-15.2)  gm/dl


 


Hct  41.2  38.8  (35.5-45.6)  %


 


Plt Count  262  260  (140-440)  K/mm3








                          Comprehensive Metabolic Panel











  07/26/22 07/26/22 07/26/22 Range/Units





  16:15 21:00 Unknown 


 


Sodium  131 L  129 L  129 L  (137-145)  mmol/L


 


Potassium  4.5  D  4.6  7.5 H* D  (3.6-5.0)  mmol/L


 


Chloride  91.1 L  89.7 L  91.8 L  ()  mmol/L


 


Carbon Dioxide  24  D  23  13 L D  (22-30)  mmol/L


 


BUN  79 H  83 H  113 H  (9-20)  mg/dL


 


Creatinine  12.1 H  13.0 H  16.1 H  (0.8-1.3)  mg/dL


 


Glucose  92  78  83  ()  mg/dL


 


Calcium  7.9 L  7.9 L  8.9  (8.4-10.2)  mg/dL


 


AST     (5-40)  units/L


 


ALT     (7-56)  units/L


 


Alkaline Phosphatase     ()  units/L


 


Total Protein     (6.3-8.2)  g/dL


 


Albumin     (3.9-5)  g/dL














  07/27/22 Range/Units





  04:00 


 


Sodium  128 L  (137-145)  mmol/L


 


Potassium  5.1 H  (3.6-5.0)  mmol/L


 


Chloride  88.0 L  ()  mmol/L


 


Carbon Dioxide  24  (22-30)  mmol/L


 


BUN  84 H  (9-20)  mg/dL


 


Creatinine  13.5 H  (0.8-1.3)  mg/dL


 


Glucose  139 H  ()  mg/dL


 


Calcium  7.9 L  (8.4-10.2)  mg/dL


 


AST  65 H  (5-40)  units/L


 


ALT  36  (7-56)  units/L


 


Alkaline Phosphatase  129  ()  units/L


 


Total Protein  6.5  (6.3-8.2)  g/dL


 


Albumin  2.6 L  (3.9-5)  g/dL














- Imaging and Cardiology


EKG: report reviewed (Atrial fibrillation with heart rate of 120)


Echo: report reviewed





- Telemetry


EKG Rhythm: Atrial Fibrillation





- EKG


Supraventricular dysrhythmia: atrial fibrillation

## 2022-07-27 NOTE — ULTRASOUND REPORT
ULTRASOUND RENAL



INDICATION:

CARLEY.



COMPARISON:

No relevant prior imaging study available. 



FINDINGS:

RIGHT KIDNEY: Size: 13.1 cm. 

Echogenicity: Echogenic. Cortical thickness: Normal.

Hydronephrosis: None. 

Cyst or mass: Several simple cysts, largest of which measures 4 cm.  Stones: None. 



LEFT KIDNEY: Size: 13.8 cm. 

Echogenicity: Echogenic. Cortical thickness: Normal. 

Hydronephrosis: None. 

Cyst or mass: Simple 2.1 cm left renal cyst.  Stones: None. 



Urinary Bladder: No significant abnormality.

Free Fluid: None.

Additional Findings: None.



IMPRESSION

1. Echogenic kidneys characteristic for medical renal disease. No hydronephrosis.

2. Simple bilateral renal cysts



Signer Name: Gomez Hubbard MD 

Signed: 7/27/2022 9:48 AM

Workstation Name: [id number]

## 2022-07-28 LAB
BUN SERPL-MCNC: 67 MG/DL (ref 9–20)
BUN/CREAT SERPL: 6 %
CALCIUM SERPL-MCNC: 8.6 MG/DL (ref 8.4–10.2)
CREATININE,URINE: 62.6 MG/DL (ref 0.1–20)
HCT VFR BLD CALC: 34.9 % (ref 35.5–45.6)
HEMOLYSIS INDEX: 2
HGB BLD-MCNC: 12.2 GM/DL (ref 11.8–15.2)
MCHC RBC AUTO-ENTMCNC: 35 % (ref 32–34)
MCV RBC AUTO: 89 FL (ref 84–94)
PLATELET # BLD: 255 K/MM3 (ref 140–440)
RBC # BLD AUTO: 3.93 M/MM3 (ref 3.65–5.03)

## 2022-07-28 PROCEDURE — 5A1D70Z PERFORMANCE OF URINARY FILTRATION, INTERMITTENT, LESS THAN 6 HOURS PER DAY: ICD-10-PCS | Performed by: INTERNAL MEDICINE

## 2022-07-28 RX ADMIN — INSULIN HUMAN SCH UNITS: 100 INJECTION, SOLUTION PARENTERAL at 18:23

## 2022-07-28 RX ADMIN — CEFTRIAXONE SODIUM SCH MLS/HR: 2 INJECTION, POWDER, FOR SOLUTION INTRAMUSCULAR; INTRAVENOUS at 13:54

## 2022-07-28 RX ADMIN — OXYCODONE AND ACETAMINOPHEN PRN TAB: 5; 325 TABLET ORAL at 17:29

## 2022-07-28 RX ADMIN — OXYCODONE AND ACETAMINOPHEN PRN TAB: 5; 325 TABLET ORAL at 11:47

## 2022-07-28 RX ADMIN — AMIODARONE HYDROCHLORIDE SCH MG: 200 TABLET ORAL at 11:47

## 2022-07-28 RX ADMIN — AMIODARONE HYDROCHLORIDE SCH MLS/HR: 50 INJECTION, SOLUTION INTRAVENOUS at 10:00

## 2022-07-28 RX ADMIN — INSULIN HUMAN SCH UNITS: 100 INJECTION, SOLUTION PARENTERAL at 12:22

## 2022-07-28 RX ADMIN — Medication SCH ML: at 09:22

## 2022-07-28 RX ADMIN — ASPIRIN SCH MG: 325 TABLET ORAL at 09:19

## 2022-07-28 RX ADMIN — HEPARIN SODIUM SCH MLS/HR: 5000 INJECTION, SOLUTION INTRAVENOUS at 06:07

## 2022-07-28 NOTE — PROGRESS NOTE
Assessment and Plan





43 y/o male with hyperkalemia, secondary to acute renal failure of unknown 

etiology with hypoglycemia likely secondary to oral medications not fully being 

cleared secondary to renal impairment.





7/28/22:  Off D10 now, tolerating PO.  From a critical care standpoint, stable 

for transfer.  Continue Amio and follow up cards recs.  Vascular consulted given

toe appearance and concern for infection.  Continue IV abx therapy and follow up

cultures.  Will likely sign off once out of unit.





7/27/22:  Bladder scan showed no urine in bladder so will hold on joyce 

placement.  HD per renal for 3 days straight so again tomorrow.  Patient still o

n D10 at 75 and highest Blood sugar has been 195.  Will continue for now but 

will drop down to 50.  Has a diet.  Likely oral hypoglycemics are still in 

system.  If not improved after HD session tomorrow, may need to ask renal about 

further sessions to help with removal of active metabolites.  Given that no real

allergy to amio, cards will initiate today.  Weighed the risk and benefits and 

will start anticoagulation but with heparin as patient will likely need long 

term dialysis catheter placement.  Continue ICU care.  Prognosis still remains 

guarded.





1.  Renal-nephro already ordered HD and patient is on it.  Unsure if bladder 

scan was done but patient has no Joyce as he refused it in the ED.  Still would 

consider bladder scan and will discuss placement of joyce based on scan results.

 HD per renal.  Likely needs renal ultrasound as well.





2.  Endocrine-persistent hypoglycemia requiring D10 drip.  Once patient has 3 

consecutive sugars greater than 180 then would be ok with stopping D10.  Hopeful

HD will help to remove active metabolites of drugs that maybe adding to 

continued hypoglycemia.  If not improvement post HD, will need further work up. 

Ok with feeding patient as long as he remains alert and can protect his airway. 

Continue q1 hour finger sticks





3.  Cardiovascular-tachycardic.  Questionable afib.  Awaiting 12 lead EKG.  Hold

on therapy for right now.  Cards is following as well.  Stopped Dig given renal 

function.





4.  GI- ok with feeding patient, needs prophylaxis for ulcers.





5.  Neuro-some confusion on questioning but for the most part stable, will 

continue to monitor





6.  Guarded prognosis.





CCt 31 minutes.











Subjective


Date of service: 07/28/22


Principal diagnosis: CARLEY 


Interval history: 





Blood sugars finally broke 200 and patient is starting to make urine again.  

Currently on HD.  HR is still not controlled but lower.  Currently on Amiodarone

drip.  Remains on room air.





Objective





- Constitutional


Vitals: 


                               Vital Signs - 12hr











  07/28/22 07/28/22 07/28/22





  02:00 03:00 04:00


 


Temperature   98.2 F


 


Pulse Rate 111 H 114 H 109 H


 


Pulse Rate [   





From Monitor]   


 


Respiratory 17 14 17





Rate   


 


Respiratory   





Rate [Throat]   


 


Blood Pressure 109/69 122/69 125/71


 


O2 Sat by Pulse 99 96 98





Oximetry   


 


O2 Sat by Pulse   





Oximetry [   





Anterior   





Bilateral   





Throughout]   














  07/28/22 07/28/22 07/28/22





  05:00 06:00 07:00


 


Temperature   


 


Pulse Rate 100 H 117 H 98 H


 


Pulse Rate [   





From Monitor]   


 


Respiratory 12 11 L 13





Rate   


 


Respiratory   





Rate [Throat]   


 


Blood Pressure 107/71 112/71 124/70


 


O2 Sat by Pulse 100 100 100





Oximetry   


 


O2 Sat by Pulse   





Oximetry [   





Anterior   





Bilateral   





Throughout]   














  07/28/22 07/28/22 07/28/22





  07:37 07:55 08:00


 


Temperature   98.5 F


 


Pulse Rate 122 H  113 H


 


Pulse Rate [  116 H 





From Monitor]   


 


Respiratory   12





Rate   


 


Respiratory   





Rate [Throat]   


 


Blood Pressure   112/76


 


O2 Sat by Pulse  100 100





Oximetry   


 


O2 Sat by Pulse   





Oximetry [   





Anterior   





Bilateral   





Throughout]   














  07/28/22 07/28/22 07/28/22





  09:00 09:20 10:00


 


Temperature   98.6 F


 


Pulse Rate 119 H 112 H 115 H


 


Pulse Rate [   





From Monitor]   


 


Respiratory 16  16





Rate   


 


Respiratory   





Rate [Throat]   


 


Blood Pressure 118/85 118/85 117/92


 


O2 Sat by Pulse 93  98





Oximetry   


 


O2 Sat by Pulse   97





Oximetry [   





Anterior   





Bilateral   





Throughout]   














  07/28/22 07/28/22 07/28/22





  11:00 11:15 11:30


 


Temperature   


 


Pulse Rate 114 H 74 87


 


Pulse Rate [   





From Monitor]   


 


Respiratory 18  





Rate   


 


Respiratory   





Rate [Throat]   


 


Blood Pressure 123/80 121/80 128/73


 


O2 Sat by Pulse   





Oximetry   


 


O2 Sat by Pulse   





Oximetry [   





Anterior   





Bilateral   





Throughout]   














  07/28/22 07/28/22 07/28/22





  11:45 11:47 12:00


 


Temperature   98.8 F


 


Pulse Rate 96 H  120 H


 


Pulse Rate [   120 H





From Monitor]   


 


Respiratory  14 18





Rate   


 


Respiratory   14





Rate [Throat]   


 


Blood Pressure 118/77  120/79


 


O2 Sat by Pulse   95





Oximetry   


 


O2 Sat by Pulse   





Oximetry [   





Anterior   





Bilateral   





Throughout]   














  07/28/22 07/28/22 07/28/22





  12:15 12:30 12:45


 


Temperature   


 


Pulse Rate 96 H 120 H 96 H


 


Pulse Rate [   





From Monitor]   


 


Respiratory   





Rate   


 


Respiratory   





Rate [Throat]   


 


Blood Pressure 115/87 111/80 120/78


 


O2 Sat by Pulse   





Oximetry   


 


O2 Sat by Pulse   





Oximetry [   





Anterior   





Bilateral   





Throughout]   














  07/28/22 07/28/22





  13:00 13:15


 


Temperature  


 


Pulse Rate 121 H 64


 


Pulse Rate [  





From Monitor]  


 


Respiratory 14 





Rate  


 


Respiratory  





Rate [Throat]  


 


Blood Pressure 113/70 116/72


 


O2 Sat by Pulse 97 





Oximetry  


 


O2 Sat by Pulse  





Oximetry [  





Anterior  





Bilateral  





Throughout]  














- Labs


CBC & Chem 7: 


                                 07/28/22 04:03





                                 07/28/22 04:03


Labs: 


                              Abnormal lab results











  07/27/22 07/27/22 07/27/22 Range/Units





  12:54 14:35 17:58 


 


WBC     (4.5-11.0)  K/mm3


 


Hct     (35.5-45.6)  %


 


MCHC     (32-34)  %


 


PT  15.8 H    (12.2-14.9)  Sec.


 


Heparin Anti-Xa Level     (0.3-0.7)  U.I./ml


 


Sodium     (137-145)  mmol/L


 


Chloride     ()  mmol/L


 


BUN     (9-20)  mg/dL


 


Creatinine     (0.8-1.3)  mg/dL


 


Glucose     ()  mg/dL


 


POC Glucose   225 H  219 H  ()  mg/dL


 


Urine Creatinine     (0.1-20.0)  mg/dL


 


Urine Total Protein     (5-11.8)  mg/dL














  07/27/22 07/28/22 07/28/22 Range/Units





  23:20 04:03 04:03 


 


WBC   15.5 H   (4.5-11.0)  K/mm3


 


Hct   34.9 L   (35.5-45.6)  %


 


MCHC   35 H   (32-34)  %


 


PT     (12.2-14.9)  Sec.


 


Heparin Anti-Xa Level     (0.3-0.7)  U.I./ml


 


Sodium    132 L  (137-145)  mmol/L


 


Chloride    89.9 L  ()  mmol/L


 


BUN    67 H  (9-20)  mg/dL


 


Creatinine    11.5 H  (0.8-1.3)  mg/dL


 


Glucose    157 H  ()  mg/dL


 


POC Glucose  197 H    ()  mg/dL


 


Urine Creatinine     (0.1-20.0)  mg/dL


 


Urine Total Protein     (5-11.8)  mg/dL














  07/28/22 07/28/22 07/28/22 Range/Units





  06:07 06:15 07:10 


 


WBC     (4.5-11.0)  K/mm3


 


Hct     (35.5-45.6)  %


 


MCHC     (32-34)  %


 


PT     (12.2-14.9)  Sec.


 


Heparin Anti-Xa Level   0.25 L   (0.3-0.7)  U.I./ml


 


Sodium     (137-145)  mmol/L


 


Chloride     ()  mmol/L


 


BUN     (9-20)  mg/dL


 


Creatinine     (0.8-1.3)  mg/dL


 


Glucose     ()  mg/dL


 


POC Glucose  139 H    ()  mg/dL


 


Urine Creatinine    62.6 H  (0.1-20.0)  mg/dL


 


Urine Total Protein    15 H  (5-11.8)  mg/dL














  07/28/22 Range/Units





  11:42 


 


WBC   (4.5-11.0)  K/mm3


 


Hct   (35.5-45.6)  %


 


MCHC   (32-34)  %


 


PT   (12.2-14.9)  Sec.


 


Heparin Anti-Xa Level   (0.3-0.7)  U.I./ml


 


Sodium   (137-145)  mmol/L


 


Chloride   ()  mmol/L


 


BUN   (9-20)  mg/dL


 


Creatinine   (0.8-1.3)  mg/dL


 


Glucose   ()  mg/dL


 


POC Glucose  191 H  ()  mg/dL


 


Urine Creatinine   (0.1-20.0)  mg/dL


 


Urine Total Protein   (5-11.8)  mg/dL














Medications & Allergies





- Medications


Allergies/Adverse Reactions: 


                                    Allergies





adhesive Allergy (Verified 07/25/22 14:07)


   Rash


ibuprofen [From Motrin] Allergy (Verified 07/25/22 14:07)


   Swelling


lisinopril Allergy (Verified 07/25/22 14:07)


   COUGH


metoprolol Allergy (Verified 07/25/22 14:07)


   Nausea


amiodarone Adverse Reaction (Verified 07/25/22 14:07)


   Nausea


Iodinated Contrast Media [Iodinated Contrast Media - IV Dye] Adverse Reaction 

(Verified 07/25/22 14:07)


   KIDNEYS SHUT DOWN








Home Medications: 


                                Home Medications











 Medication  Instructions  Recorded  Confirmed  Last Taken  Type


 


Digoxin [Lanoxin] 0.25 mg PO DAILY@1700 #30 tablet 01/05/15 06/27/17 06/26/17 Rx


 


Aspirin 325 mg PO QDAY 06/27/17 06/27/17 06/26/17 History


 


Furosemide [Lasix TAB] 40 mg PO QDAY 06/27/17 06/27/17 06/26/17 History


 


Losartan [Cozaar] 50 mg PO QDAY 06/27/17 06/27/17 06/26/17 History


 


HYDROcodone/APAP 5-325 [[place] 1 each PO Q6HR PRN #20 tablet 02/27/20  Unknown Rx





5/325]     


 


Penicillin Vk [Veetids TAB] 500 mg PO QID #21 tablet 02/27/20  Unknown Rx


 


Acetaminophen [Acetaminophen TAB] 1,000 mg PO Q6HR #30 tablet 05/27/20  Unknown 

Rx


 


cephALEXin [Keflex] 500 mg PO Q8HR #30 cap 05/27/20  Unknown Rx











Active Medications: 














Generic Name Dose Route Start Last Admin





  Trade Name Freq  PRN Reason Stop Dose Admin


 


Acetaminophen  650 mg  07/25/22 16:54  07/27/22 09:52





  Acetaminophen 325 Mg Tab  PO   650 mg





  Q4H PRN   Administration





  Pain MILD(1-3)/Fever >100.5/HA  


 


Amiodarone HCl  200 mg  07/28/22 11:00  07/28/22 11:47





  Amiodarone 200 Mg Tab  PO   200 mg





  BID LYNDA   Administration


 


Aspirin  325 mg  07/26/22 10:00  07/28/22 09:19





  Aspirin 325 Mg Tab  PO   325 mg





  QDAY LYNDA   Administration


 


Dextrose  50 ml  07/26/22 10:45 





  Dextrose 50% In Water (25gm) 50 Ml Syringe  IV  





  Q30MIN PRN  





  Hypoglycemia  





  Protocol  


 


Glucagon  1 mg  07/25/22 15:39  07/25/22 16:08





  Glucagon (Human Recombinant) 1 Mg/Ml Inj  IV   1 mg





  Q1H PRN   Administration





  Hypoglycemia  


 


Hydralazine HCl  10 mg  07/25/22 18:41 





  Hydralazine 20 Mg/1 Ml Inj  IV  





  Q2H PRN  





  Blood Pressure  


 


Sodium Chloride  100 mls @ 999 mls/hr  07/26/22 07:08 





  Nacl 0.9%  IV  





  CASSIDY PRN  





  Hypotension  


 


Amiodarone HCl 900 mg/  500 mls @ 33.333 mls/hr  07/27/22 11:15  07/28/22 10:00





  Dextrose  IV   0.5 mg/min





  AS DIRECT LYNDA   16.667 mls/hr





    Administration





  Protocol  





  1 MG/MIN  


 


Heparin Sodium/Sodium Chloride  25,000 unit in 500 mls @ 30 mls/hr  07/27/22 

12:00  07/28/22 06:46





  Heparin/ 0.45% Nacl-25,000 Unit/500 Ml  IV   1,350 units/hr





  TITR LYNDA   27 mls/hr





    Titration





  Protocol  





  1,500 UNITS/HR  


 


Ceftriaxone Sodium  2 gm in 100 mls @ 200 mls/hr  07/27/22 12:00  07/27/22 13:16





  Rocephin/Ns 2 Gm/100 Ml  IV   200 mls/hr





  Q24H LYNDA   Administration





  Protocol  


 


Insulin Human Regular  0 units  07/28/22 13:00  07/28/22 12:22





  Insulin Regular, Human 100 Units/1 Ml  SUB-Q   1 units





  Q6H LYNDA   Administration





  Protocol  


 


Morphine Sulfate  2 mg  07/25/22 16:54  07/27/22 13:48





  Morphine 2 Mg/1 Ml Inj  IV   2 mg





  Q4H PRN   Administration





  Pain, Moderate (4-6)  


 


Ondansetron HCl  4 mg  07/27/22 12:00  07/27/22 13:48





  Ondansetron 4 Mg/2 Ml Inj  IV   4 mg





  Q6H PRN   Administration





  Nausea And Vomiting  


 


Oxycodone/Acetaminophen  1 tab  07/25/22 16:54  07/28/22 11:47





  Oxycodone /Acetaminophen 5-325mg Tab  PO   1 tab





  Q6H PRN   Administration





  Pain, Moderate (4-6)  


 


Sodium Chloride  10 ml  07/25/22 22:00  07/28/22 09:22





  Sodium Chloride 0.9% 10 Ml Flush Syringe  IV   10 ml





  BID LYNDA   Administration


 


Sodium Chloride  10 ml  07/25/22 16:54 





  Sodium Chloride 0.9% 10 Ml Flush Syringe  IV  





  PRN PRN  





  LINE FLUSH  














HEART Score





- HEART Score


Age: 45-65


Risk factors: > 3 risk factors or hx of atherosclerotic disease


Troponin: 1-3x normal limit





- Critical Actions


Critical Actions: 4-6 pts:12-16.6% risk of adverse cardiac event. Should be 

admitted

## 2022-07-28 NOTE — PROGRESS NOTE
Assessment and Plan





- Patient Problems


(1) CARLEY (acute kidney injury)


Current Visit: Yes   Status: Acute   


Plan to address problem: 


given refractory hyperkalemia and metabolic acidosis, initiated HD via vascath 

on 7/26/22. plan for daily HD x 3 days for correction of solute clearance, 

hyperkalemia, metabolic acidosis.  renal US showed echogenic kidneys ch

aracteristic for medical renal disease, no hydronephrosis seen. 


avoid nephrotoxins, NSAIDs, IV contrast. Will monitor lytes and renal parameters

closely and make further recommendations. UOP is significantly increased over 

the last 24hrs. 








(2) Hyperkalemia


Current Visit: Yes   Status: Acute   


Plan to address problem: 


 to be corrected with daily HD. cont 2g K renal diet 








(3) CHF (congestive heart failure), NYHA class II


Current Visit: Yes   Status: Chronic   


Qualifiers: 


   Congestive heart failure chronicity: chronic 


Plan to address problem: 


volume control with HD. Echo showed LVEF 20 to 25% moderate concentric LVH.  

Right ventricle is dilated.  Right ventricle hypokinetic.  Left atrium is 

severely dilated.  Right atrium dilated. follow cardiology recommendations 








(4) Hyponatremia


Current Visit: Yes   Status: Acute   


Plan to address problem: 


likely hypervolemic hyponatremia, to be corrected with volume control via HD 








(5) Atrial fibrillation with RVR


Current Visit: No   Status: Acute   


Plan to address problem: 


rate control as per cardiology recommendations. Cleared from renal stand point 

for initiation of eliquis 2.5mg po bid for anticoagulation. May need to be held 

3-4 days prior possible permcath placement, if no significant renal recovery is 

seen 








Subjective


Date of service: 07/28/22


Principal diagnosis: CARLEY 


Interval history: 





patient seen in the ICU, tolerating HD well, without acute issues. increased 

urine output > 2L/24h noted 





Objective





- Vital Signs


Vital signs: 


                               Vital Signs - 12hr











  07/28/22 07/28/22 07/28/22





  02:00 03:00 04:00


 


Temperature   98.2 F


 


Pulse Rate 111 H 114 H 109 H


 


Pulse Rate [   





From Monitor]   


 


Respiratory 17 14 17





Rate   


 


Respiratory   





Rate [Throat]   


 


Blood Pressure 109/69 122/69 125/71


 


O2 Sat by Pulse 99 96 98





Oximetry   


 


O2 Sat by Pulse   





Oximetry [   





Anterior   





Bilateral   





Throughout]   














  07/28/22 07/28/22 07/28/22





  05:00 06:00 07:00


 


Temperature   


 


Pulse Rate 100 H 117 H 98 H


 


Pulse Rate [   





From Monitor]   


 


Respiratory 12 11 L 13





Rate   


 


Respiratory   





Rate [Throat]   


 


Blood Pressure 107/71 112/71 124/70


 


O2 Sat by Pulse 100 100 100





Oximetry   


 


O2 Sat by Pulse   





Oximetry [   





Anterior   





Bilateral   





Throughout]   














  07/28/22 07/28/22 07/28/22





  07:37 07:55 08:00


 


Temperature   98.5 F


 


Pulse Rate 122 H  113 H


 


Pulse Rate [  116 H 





From Monitor]   


 


Respiratory   12





Rate   


 


Respiratory   





Rate [Throat]   


 


Blood Pressure   112/76


 


O2 Sat by Pulse  100 100





Oximetry   


 


O2 Sat by Pulse   





Oximetry [   





Anterior   





Bilateral   





Throughout]   














  07/28/22 07/28/22 07/28/22





  09:00 09:20 10:00


 


Temperature   98.6 F


 


Pulse Rate 119 H 112 H 115 H


 


Pulse Rate [   





From Monitor]   


 


Respiratory 16  16





Rate   


 


Respiratory   





Rate [Throat]   


 


Blood Pressure 118/85 118/85 117/92


 


O2 Sat by Pulse 93  98





Oximetry   


 


O2 Sat by Pulse   97





Oximetry [   





Anterior   





Bilateral   





Throughout]   














  07/28/22 07/28/22 07/28/22





  11:00 11:15 11:30


 


Temperature   


 


Pulse Rate 114 H 74 87


 


Pulse Rate [   





From Monitor]   


 


Respiratory 18  





Rate   


 


Respiratory   





Rate [Throat]   


 


Blood Pressure 123/80 121/80 128/73


 


O2 Sat by Pulse   





Oximetry   


 


O2 Sat by Pulse   





Oximetry [   





Anterior   





Bilateral   





Throughout]   














  07/28/22 07/28/22 07/28/22





  11:45 11:47 12:00


 


Temperature   98.8 F


 


Pulse Rate 96 H  120 H


 


Pulse Rate [   120 H





From Monitor]   


 


Respiratory  14 18





Rate   


 


Respiratory   14





Rate [Throat]   


 


Blood Pressure 118/77  120/79


 


O2 Sat by Pulse   95





Oximetry   


 


O2 Sat by Pulse   





Oximetry [   





Anterior   





Bilateral   





Throughout]   














  07/28/22 07/28/22 07/28/22





  12:15 12:30 12:45


 


Temperature   


 


Pulse Rate 96 H 120 H 96 H


 


Pulse Rate [   





From Monitor]   


 


Respiratory   





Rate   


 


Respiratory   





Rate [Throat]   


 


Blood Pressure 115/87 111/80 120/78


 


O2 Sat by Pulse   





Oximetry   


 


O2 Sat by Pulse   





Oximetry [   





Anterior   





Bilateral   





Throughout]   














  07/28/22





  13:00


 


Temperature 


 


Pulse Rate 53 L


 


Pulse Rate [ 





From Monitor] 


 


Respiratory 





Rate 


 


Respiratory 





Rate [Throat] 


 


Blood Pressure 113/70


 


O2 Sat by Pulse 





Oximetry 


 


O2 Sat by Pulse 





Oximetry [ 





Anterior 





Bilateral 





Throughout] 














- General Appearance


General appearance: well-developed, well-nourished, appears stated age, obese


EENT: ATNC, PERRL, mucous membranes moist


Neck: no JVD


Respiratory: Present: Clear to Ascultation


Cardiology: regular, S1S2


Gastrointestinal: normoactive bowel sounds


Integumentary: no rash


Neurologic: no focal deficit, alert and oriented x3, strength 5/5, CN 3-12 

intact


Psychiatric: mood/affect appropriate, cooperative





- Lab





                                 07/28/22 04:03





                                 07/28/22 04:03


                             Most recent lab results











Calcium  8.6 mg/dL (8.4-10.2)   07/28/22  04:03    


 


Phosphorus  7.40 mg/dL (2.5-4.5)  H  07/27/22  04:00    


 


Magnesium  1.50 mg/dL (1.7-2.3)  L  07/27/22  04:00    


 


Urine Creatinine  62.6 mg/dL (0.1-20.0)  H  07/28/22  07:10    


 


Urine Sodium  26 mmol/L  07/28/22  07:10    


 


Urine Total Protein  15 mg/dL (5-11.8)  H  07/28/22  07:10    














Medications & Allergies





- Medications


Allergies/Adverse Reactions: 


                                    Allergies





adhesive Allergy (Verified 07/25/22 14:07)


   Rash


ibuprofen [From Motrin] Allergy (Verified 07/25/22 14:07)


   Swelling


lisinopril Allergy (Verified 07/25/22 14:07)


   COUGH


metoprolol Allergy (Verified 07/25/22 14:07)


   Nausea


amiodarone Adverse Reaction (Verified 07/25/22 14:07)


   Nausea


Iodinated Contrast Media [Iodinated Contrast Media - IV Dye] Adverse Reaction 

(Verified 07/25/22 14:07)


   KIDNEYS SHUT DOWN








Home Medications: 


                                Home Medications











 Medication  Instructions  Recorded  Confirmed  Last Taken  Type


 


Digoxin [Lanoxin] 0.25 mg PO DAILY@1700 #30 tablet 01/05/15 06/27/17 06/26/17 Rx


 


Aspirin 325 mg PO QDAY 06/27/17 06/27/17 06/26/17 History


 


Furosemide [Lasix TAB] 40 mg PO QDAY 06/27/17 06/27/17 06/26/17 History


 


Losartan [Cozaar] 50 mg PO QDAY 06/27/17 06/27/17 06/26/17 History


 


HYDROcodone/APAP 5-325 [[place] 1 each PO Q6HR PRN #20 tablet 02/27/20  Unknown Rx





5/325]     


 


Penicillin Vk [Veetids TAB] 500 mg PO QID #21 tablet 02/27/20  Unknown Rx


 


Acetaminophen [Acetaminophen TAB] 1,000 mg PO Q6HR #30 tablet 05/27/20  Unknown 

Rx


 


cephALEXin [Keflex] 500 mg PO Q8HR #30 cap 05/27/20  Unknown Rx











Active Medications: 














Generic Name Dose Route Start Last Admin





  Trade Name Freq  PRN Reason Stop Dose Admin


 


Acetaminophen  650 mg  07/25/22 16:54  07/27/22 09:52





  Acetaminophen 325 Mg Tab  PO   650 mg





  Q4H PRN   Administration





  Pain MILD(1-3)/Fever >100.5/HA  


 


Amiodarone HCl  200 mg  07/28/22 11:00  07/28/22 11:47





  Amiodarone 200 Mg Tab  PO   200 mg





  BID LYNDA   Administration


 


Aspirin  325 mg  07/26/22 10:00  07/28/22 09:19





  Aspirin 325 Mg Tab  PO   325 mg





  QDAY LNYDA   Administration


 


Dextrose  50 ml  07/26/22 10:45 





  Dextrose 50% In Water (25gm) 50 Ml Syringe  IV  





  Q30MIN PRN  





  Hypoglycemia  





  Protocol  


 


Glucagon  1 mg  07/25/22 15:39  07/25/22 16:08





  Glucagon (Human Recombinant) 1 Mg/Ml Inj  IV   1 mg





  Q1H PRN   Administration





  Hypoglycemia  


 


Hydralazine HCl  10 mg  07/25/22 18:41 





  Hydralazine 20 Mg/1 Ml Inj  IV  





  Q2H PRN  





  Blood Pressure  


 


Sodium Chloride  100 mls @ 999 mls/hr  07/26/22 07:08 





  Nacl 0.9%  IV  





  CASSIDY PRN  





  Hypotension  


 


Amiodarone HCl 900 mg/  500 mls @ 33.333 mls/hr  07/27/22 11:15  07/28/22 10:00





  Dextrose  IV   0.5 mg/min





  AS DIRECT LYNDA   16.667 mls/hr





    Administration





  Protocol  





  1 MG/MIN  


 


Heparin Sodium/Sodium Chloride  25,000 unit in 500 mls @ 30 mls/hr  07/27/22 

12:00  07/28/22 06:46





  Heparin/ 0.45% Nacl-25,000 Unit/500 Ml  IV   1,350 units/hr





  TITR LYNDA   27 mls/hr





    Titration





  Protocol  





  1,500 UNITS/HR  


 


Ceftriaxone Sodium  2 gm in 100 mls @ 200 mls/hr  07/27/22 12:00  07/27/22 13:16





  Rocephin/Ns 2 Gm/100 Ml  IV   200 mls/hr





  Q24H LYNDA   Administration





  Protocol  


 


Insulin Human Regular  0 units  07/28/22 13:00  07/28/22 12:22





  Insulin Regular, Human 100 Units/1 Ml  SUB-Q   1 units





  Q6H LYNDA   Administration





  Protocol  


 


Morphine Sulfate  2 mg  07/25/22 16:54  07/27/22 13:48





  Morphine 2 Mg/1 Ml Inj  IV   2 mg





  Q4H PRN   Administration





  Pain, Moderate (4-6)  


 


Ondansetron HCl  4 mg  07/27/22 12:00  07/27/22 13:48





  Ondansetron 4 Mg/2 Ml Inj  IV   4 mg





  Q6H PRN   Administration





  Nausea And Vomiting  


 


Oxycodone/Acetaminophen  1 tab  07/25/22 16:54  07/28/22 11:47





  Oxycodone /Acetaminophen 5-325mg Tab  PO   1 tab





  Q6H PRN   Administration





  Pain, Moderate (4-6)  


 


Sodium Chloride  10 ml  07/25/22 22:00  07/28/22 09:22





  Sodium Chloride 0.9% 10 Ml Flush Syringe  IV   10 ml





  BID LYNDA   Administration


 


Sodium Chloride  10 ml  07/25/22 16:54 





  Sodium Chloride 0.9% 10 Ml Flush Syringe  IV  





  PRN PRN  





  LINE FLUSH

## 2022-07-28 NOTE — PROGRESS NOTE
Assessment and Plan





Patient is a 42-year-old male with a past medical history of HFrEF, nonischemic 

cardiomyopathy, chronic A. fib, hypertension, COPD, diabetes, history of CVA in 

January 2021 with left-sided hemiplegia and bedridden who came to the ED for 

complaint of weakness and hypoglycemia





Acute renal failure-nephrology following


Altered mental status


Acute on chronic heart failure


Hyperkalemia


Hyponatremia


Hypoglycemia


Hypertension


Cardiomyopathy


Diabetes


History of CVA


A. fib





Echo 11/8/2020-the study was technically difficult in quality.  Arrhythmia was 

noted during the study LVEF is moderately decreased 35-40%. Regional wall motion

abnormalities noted RV moderately dilated. RV systolic function is moderately 

reduced. RVSP is mildly elevated No pericardial effusion No definite or 

significant change in the EF from 10/15/2018 Consider an infiltrative 

cardiomyopathy such as amyloid


Echo 7/26/2022-EF 20 to 25% moderate concentric LVH.  Right ventricle is 

dilated.  Right ventricle hypokinetic.  Left atrium is severely dilated.  Right 

atrium dilated


Per documentation outpatient medications: Losartan 50 mg p.o. daily Lasix 40 mg 

p.o. daily, digoxin 0.25 mg p.o. daily, asa





Plan:





Telemetry reviewed patient appears to be in A. fib rate 110s to 120s


Patient currently on amiodarone drip with no complaints.  Do not believe 

documented amiodarone allergy is a true allergy


Continue amiodarone drip.  Will initiate amiodarone 200 mg p.o. twice daily


No beta-blocker due to documented allergy


Patient currently anticoagulated on heparin


Will defer to nephrology recommendations for volume management


No ACE/ARB due to renal function and documented allergy





Patient seen in conjunction with Dr. Reid who agrees with plan of care


30 minutes critical care time spent care coordination.








- Patient Problems


(1) CARLEY (acute kidney injury)


Current Visit: Yes   Status: Acute   





(2) Hyperkalemia


Current Visit: Yes   Status: Acute   





(3) Hypoglycemia


Current Visit: Yes   Status: Acute   





(4) Hyponatremia


Current Visit: Yes   Status: Acute   





(5) Acute on chronic systolic CHF (congestive heart failure)


Current Visit: No   Status: Acute   





(6) Atrial fibrillation with RVR


Current Visit: No   Status: Acute   





(7) COPD (chronic obstructive pulmonary disease)


Current Visit: No   Status: Acute   





(8) History of noncompliance with medical treatment


Current Visit: No   Status: Acute   





(9) Obesity


Current Visit: No   Status: Acute   





(10) Cardiomyopathy


Current Visit: No   Status: Chronic   





Subjective


Date of service: 07/28/22


Principal diagnosis: CARLEY 


Interval history: 





Patient resting in bed in no acute distress.  Patient reports feeling 

significantly better


A. fib rates 110s to 120s





Objective


                                   Vital Signs











  Temp Pulse Pulse Resp BP Pulse Ox Pulse Ox


 


 07/28/22 12:15   96 H    115/87  


 


 07/28/22 12:00   120 H   18  120/79  95 


 


 07/28/22 11:47     14   


 


 07/28/22 11:45   96 H    118/77  


 


 07/28/22 11:30   87    128/73  


 


 07/28/22 11:15   74    121/80  


 


 07/28/22 11:00   114 H   18  123/80  


 


 07/28/22 10:00  98.6 F  115 H   16  117/92  98  97


 


 07/28/22 09:20   112 H    118/85  


 


 07/28/22 09:00   119 H   16  118/85  93 


 


 07/28/22 08:00  98.5 F  113 H   12  112/76  100 


 


 07/28/22 07:55    116 H    100 


 


 07/28/22 07:37   122 H     


 


 07/28/22 07:00   98 H   13  124/70  100 


 


 07/28/22 06:00   117 H   11 L  112/71  100 


 


 07/28/22 05:00   100 H   12  107/71  100 


 


 07/28/22 04:00  98.2 F  109 H   17  125/71  98 


 


 07/28/22 03:00   114 H   14  122/69  96 


 


 07/28/22 02:00   111 H   17  109/69  99 


 


 07/28/22 01:00   134 H   14  113/77  98 


 


 07/28/22 00:00   137 H   18  116/84  97 


 


 07/27/22 23:34  99.1 F  139 H     97 


 


 07/27/22 23:20   129 H   18  114/96  97 


 


 07/27/22 23:00   128 H   18  114/96  98 


 


 07/27/22 22:42   135 H   16  121/86  96 


 


 07/27/22 22:00   116 H   18  130/96  98 


 


 07/27/22 21:00   119 H   16  130/96  98 


 


 07/27/22 20:00   129 H   17  138/93  100 


 


 07/27/22 19:51   138 H     


 


 07/27/22 19:50  98.6 F      


 


 07/27/22 19:00   132 H   17  129/97  99 


 


 07/27/22 18:00   109 H   18  127/85  98 


 


 07/27/22 17:45   117 H   15  126/92  97 


 


 07/27/22 17:30   122 H   15  130/91  99 


 


 07/27/22 17:15   111 H   14  129/93  99 


 


 07/27/22 17:00   116 H   15  133/97  99 


 


 07/27/22 16:45   105 H   16  139/88  97 


 


 07/27/22 16:30   140 H   18  127/91  94 


 


 07/27/22 16:16   138 H   17  127/91  96 


 


 07/27/22 16:00  99.2 F  118 H  106 H  13  127/91  95 


 


 07/27/22 15:45   123 H   16  122/89  98 


 


 07/27/22 15:30   121 H   15  136/85  97 


 


 07/27/22 15:15   132 H   12  127/81  97 


 


 07/27/22 15:00   117 H   15  125/91  97 


 


 07/27/22 14:46   122 H   14  123/87  97 


 


 07/27/22 14:30   139 H   14  124/85  96 


 


 07/27/22 14:18     16   


 


 07/27/22 14:16   123 H   17  133/87  97 


 


 07/27/22 14:00   118 H   15  123/86  99 


 


 07/27/22 13:48     17   


 


 07/27/22 13:45   127 H   16  132/84  97 


 


 07/27/22 13:30   131 H   18  132/84  96 


 


 07/27/22 13:15   140 H   17  130/93  95 


 


 07/27/22 13:00   140 H   17  128/87  96 


 


 07/27/22 12:54  99.9 F H  131 H   20  127/90   96


 


 07/27/22 12:45   137 H   18  127/90  96 


 


 07/27/22 12:40   129 H    127/90  


 


 07/27/22 12:30   134 H   16  125/89  97 














- Physical Examination


General: No Apparent Distress


HEENT: Positive: PERRL


Neck: Positive: neck supple


Cardiac: Positive: irregularly irregular, Tachycardia


Lungs: Positive: Normal Breath Sounds


Neuro: Positive: Grossly Intact


Abdomen: Positive: Soft


Skin: Negative: Rash, Suspicious Lesions, Ulceration


Extremities: Present: upper extr. pulses, edema





- Labs and Meds


                                   Coagulation











  07/27/22 Range/Units





  12:54 


 


PT  15.8 H  (12.2-14.9)  Sec.


 


INR  1.13  (0.87-1.13)  


 


APTT  33.5  (24.2-36.6)  Sec.








                                       CBC











  07/28/22 Range/Units





  04:03 


 


WBC  15.5 H  (4.5-11.0)  K/mm3


 


RBC  3.93  (3.65-5.03)  M/mm3


 


Hgb  12.2  (11.8-15.2)  gm/dl


 


Hct  34.9 L  (35.5-45.6)  %


 


Plt Count  255  (140-440)  K/mm3








                          Comprehensive Metabolic Panel











  07/28/22 Range/Units





  04:03 


 


Sodium  132 L  (137-145)  mmol/L


 


Potassium  4.9  (3.6-5.0)  mmol/L


 


Chloride  89.9 L  ()  mmol/L


 


Carbon Dioxide  25  (22-30)  mmol/L


 


BUN  67 H  (9-20)  mg/dL


 


Creatinine  11.5 H  (0.8-1.3)  mg/dL


 


Glucose  157 H  ()  mg/dL


 


Calcium  8.6  (8.4-10.2)  mg/dL














- Imaging and Cardiology


EKG: report reviewed (Atrial fibrillation with heart rate of 120)


Echo: report reviewed





- Telemetry


EKG Rhythm: Atrial Fibrillation





- EKG


Supraventricular dysrhythmia: atrial fibrillation

## 2022-07-28 NOTE — CONSULTATION
History of Present Illness





- Reason for Consult


Consult date: 07/28/22


Right Foot Gangrene


Requesting physician: VENESSA MAS





- History of Present Illness





The patient is a 42-year-old male with a history of diabetes, congestive heart 

failure, and hypertension who presented to the hospital with complaints of 

weakness and the inability to urinate.  He was found to be in acute renal 

failure with hyperkalemia and despite medical management he required placement 

of a Vas-Cath for the initiation of hemodialysis.  His work-up also revealed 

atrial fibrillation as well as gangrenous changes to his right first toe.  The 

patient states that his toenail fell off approximately 2 months ago and since 

that time his toe has failed to heal.  He does not know why or how the toenail 

fell off.  The patient had a CVA approximately 1 year ago resulting in severe 

weakness of his left side.  Given the left-sided weakness the patient does not 

ambulate however he does use his right leg to pivot and transfer.  He reports a 

history of clot to his arteries approximately 2 years ago that was unable to be 

adequately treated here.  He states he was referred to an outside facility for 

management.  He denies any pain or numbness of the right foot.  He has no 

additional complaints at this time.





Past History


Past Medical History: atrial fib (With atrial thrombus), diabetes, heart 

failure, hypertension, renal failure, stroke


Past Surgical History: PTCA, Other (Attempted thrombolysis of his right tibial 

vessels in 2017)


Social history: denies: smoking, alcohol abuse, prescription drug abuse, IV drug

use


Family history: hypertension





Medications and Allergies


                                    Allergies











Allergy/AdvReac Type Severity Reaction Status Date / Time


 


adhesive Allergy  Rash Verified 07/25/22 14:07


 


ibuprofen [From Motrin] Allergy  Swelling Verified 07/25/22 14:07


 


lisinopril Allergy  COUGH Verified 07/25/22 14:07


 


metoprolol Allergy  Nausea Verified 07/25/22 14:07


 


amiodarone AdvReac  Nausea Verified 07/25/22 14:07


 


Iodinated Contrast Media AdvReac  KIDNEYS Verified 07/25/22 14:07





[Iodinated Contrast Media -   SHUT DOWN  





IV Dye]     











                                Home Medications











 Medication  Instructions  Recorded  Confirmed  Last Taken  Type


 


Digoxin [Lanoxin] 0.25 mg PO DAILY@1700 #30 tablet 01/05/15 06/27/17 06/26/17 Rx


 


Aspirin 325 mg PO QDAY 06/27/17 06/27/17 06/26/17 History


 


Furosemide [Lasix TAB] 40 mg PO QDAY 06/27/17 06/27/17 06/26/17 History


 


Losartan [Cozaar] 50 mg PO QDAY 06/27/17 06/27/17 06/26/17 History


 


HYDROcodone/APAP 5-325 [[place] 1 each PO Q6HR PRN #20 tablet 02/27/20  Unknown Rx





5/325]     


 


Penicillin Vk [Veetids TAB] 500 mg PO QID #21 tablet 02/27/20  Unknown Rx


 


Acetaminophen [Acetaminophen TAB] 1,000 mg PO Q6HR #30 tablet 05/27/20  Unknown 

Rx


 


cephALEXin [Keflex] 500 mg PO Q8HR #30 cap 05/27/20  Unknown Rx











Active Meds: 


Active Medications





Acetaminophen (Acetaminophen 325 Mg Tab)  650 mg PO Q4H PRN


   PRN Reason: Pain MILD(1-3)/Fever >100.5/HA


   Last Admin: 07/27/22 09:52 Dose:  650 mg


   


Amiodarone HCl (Amiodarone 200 Mg Tab)  200 mg PO BID LYNDA


   Last Admin: 07/28/22 11:47 Dose:  200 mg


   


Aspirin (Aspirin 325 Mg Tab)  325 mg PO QDAY LYNDA


   Last Admin: 07/28/22 09:19 Dose:  325 mg


   


Dextrose (Dextrose 50% In Water (25gm) 50 Ml Syringe)  50 ml IV Q30MIN PRN; 

Protocol


   PRN Reason: Hypoglycemia


Glucagon (Glucagon (Human Recombinant) 1 Mg/Ml Inj)  1 mg IV Q1H PRN


   PRN Reason: Hypoglycemia


   Last Admin: 07/25/22 16:08 Dose:  1 mg


   


Hydralazine HCl (Hydralazine 20 Mg/1 Ml Inj)  10 mg IV Q2H PRN


   PRN Reason: Blood Pressure


Sodium Chloride (Nacl 0.9%)  100 mls @ 999 mls/hr IV CASSIDY PRN


   PRN Reason: Hypotension


Amiodarone HCl 900 mg/ (Dextrose)  500 mls @ 33.333 mls/hr IV AS DIRECT LYNDA; 

Protocol


   Last Admin: 07/28/22 10:00 Dose:  0.5 mg/min, 16.667 mls/hr


   


Heparin Sodium/Sodium Chloride (Heparin/ 0.45% Nacl-25,000 Unit/500 Ml)  25,000 

unit in 500 mls @ 30 mls/hr IV TITR LYNDA; Protocol


   Last Titration: 07/28/22 06:46 Dose:  1,350 units/hr, 27 mls/hr


   


Ceftriaxone Sodium (Rocephin/Ns 2 Gm/100 Ml)  2 gm in 100 mls @ 200 mls/hr IV 

Q24H Formerly [facility], 1928–1983; Protocol


   Last Admin: 07/27/22 13:16 Dose:  200 mls/hr


   


Insulin Human Regular (Insulin Regular, Human 100 Units/1 Ml)  0 units SUB-Q Q6H

 Formerly [facility], 1928–1983; Protocol


   Last Admin: 07/28/22 12:22 Dose:  1 units


   


Morphine Sulfate (Morphine 2 Mg/1 Ml Inj)  2 mg IV Q4H PRN


   PRN Reason: Pain, Moderate (4-6)


   Last Admin: 07/27/22 13:48 Dose:  2 mg


   


Ondansetron HCl (Ondansetron 4 Mg/2 Ml Inj)  4 mg IV Q6H PRN


   PRN Reason: Nausea And Vomiting


   Last Admin: 07/27/22 13:48 Dose:  4 mg


   


Oxycodone/Acetaminophen (Oxycodone /Acetaminophen 5-325mg Tab)  1 tab PO Q6H PRN


   PRN Reason: Pain, Moderate (4-6)


   Last Admin: 07/28/22 11:47 Dose:  1 tab


   


Sodium Chloride (Sodium Chloride 0.9% 10 Ml Flush Syringe)  10 ml IV BID Formerly [facility], [facility]


   Last Admin: 07/28/22 09:22 Dose:  10 ml


   


Sodium Chloride (Sodium Chloride 0.9% 10 Ml Flush Syringe)  10 ml IV PRN PRN


   PRN Reason: LINE FLUSH











Review of Systems


All systems: negative





Exam





- Constitutional


Vitals: 


                                        











Temp Pulse Resp BP Pulse Ox


 


 98.6 F   121 H  15   114/72   93 


 


 07/28/22 13:43  07/28/22 13:43  07/28/22 13:43  07/28/22 13:43  07/28/22 13:43











General appearance: Present: no acute distress, other (On hemodialysis during 

the exam)





- Respiratory


Respiratory effort: normal





- Cardiovascular


Rhythm: irregularly irregular





- Extremities


Extremities: pulses intact (Weakly palpable dorsalis pedis pulses bilaterally), 

normal temperature, abnormal (Right first toe with toenail missing, dry 

gangrenous changes to the distal right first toe)





- Abdominal


General gastrointestinal: Present: soft


Male genitourinary: Present: deferred





- Rectal


Rectal Exam: deferred





- Musculoskeletal


Musculoskeletal: left sided weakness





Results





- Labs


CBC & Chem 7: 


                                 07/28/22 04:03





                                 07/28/22 04:03


Labs: 


                              Abnormal lab results











  07/27/22 07/27/22 07/27/22 Range/Units





  12:54 14:35 17:58 


 


WBC     (4.5-11.0)  K/mm3


 


Hct     (35.5-45.6)  %


 


MCHC     (32-34)  %


 


PT  15.8 H    (12.2-14.9)  Sec.


 


Heparin Anti-Xa Level     (0.3-0.7)  U.I./ml


 


Sodium     (137-145)  mmol/L


 


Chloride     ()  mmol/L


 


BUN     (9-20)  mg/dL


 


Creatinine     (0.8-1.3)  mg/dL


 


Glucose     ()  mg/dL


 


POC Glucose   225 H  219 H  ()  mg/dL


 


Urine Creatinine     (0.1-20.0)  mg/dL


 


Urine Total Protein     (5-11.8)  mg/dL














  07/27/22 07/28/22 07/28/22 Range/Units





  23:20 04:03 04:03 


 


WBC   15.5 H   (4.5-11.0)  K/mm3


 


Hct   34.9 L   (35.5-45.6)  %


 


MCHC   35 H   (32-34)  %


 


PT     (12.2-14.9)  Sec.


 


Heparin Anti-Xa Level     (0.3-0.7)  U.I./ml


 


Sodium    132 L  (137-145)  mmol/L


 


Chloride    89.9 L  ()  mmol/L


 


BUN    67 H  (9-20)  mg/dL


 


Creatinine    11.5 H  (0.8-1.3)  mg/dL


 


Glucose    157 H  ()  mg/dL


 


POC Glucose  197 H    ()  mg/dL


 


Urine Creatinine     (0.1-20.0)  mg/dL


 


Urine Total Protein     (5-11.8)  mg/dL














  07/28/22 07/28/22 07/28/22 Range/Units





  06:07 06:15 07:10 


 


WBC     (4.5-11.0)  K/mm3


 


Hct     (35.5-45.6)  %


 


MCHC     (32-34)  %


 


PT     (12.2-14.9)  Sec.


 


Heparin Anti-Xa Level   0.25 L   (0.3-0.7)  U.I./ml


 


Sodium     (137-145)  mmol/L


 


Chloride     ()  mmol/L


 


BUN     (9-20)  mg/dL


 


Creatinine     (0.8-1.3)  mg/dL


 


Glucose     ()  mg/dL


 


POC Glucose  139 H    ()  mg/dL


 


Urine Creatinine    62.6 H  (0.1-20.0)  mg/dL


 


Urine Total Protein    15 H  (5-11.8)  mg/dL














  07/28/22 Range/Units





  11:42 


 


WBC   (4.5-11.0)  K/mm3


 


Hct   (35.5-45.6)  %


 


MCHC   (32-34)  %


 


PT   (12.2-14.9)  Sec.


 


Heparin Anti-Xa Level   (0.3-0.7)  U.I./ml


 


Sodium   (137-145)  mmol/L


 


Chloride   ()  mmol/L


 


BUN   (9-20)  mg/dL


 


Creatinine   (0.8-1.3)  mg/dL


 


Glucose   ()  mg/dL


 


POC Glucose  191 H  ()  mg/dL


 


Urine Creatinine   (0.1-20.0)  mg/dL


 


Urine Total Protein   (5-11.8)  mg/dL














Assessment and Plan





The patient is a 42-year-old male with a history of multiple medical problems 

who has dry gangrene to the right first toe.  Review of the patient's medical 

chart reveals that in 2017 he underwent an endovascular procedure of his right 

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

 that was felt to be secondary to emboli.  He had an attempted thrombolysis that

 was unsuccessful.  On exam the patient has weakly palpable pulses in his 

dorsalis pedis artery however this is likely secondary to collaterals from the 

chronic occlusion in his right lower extremity.  The patient requires an 

arterial duplex of bilateral lower extremities with ABIs to evaluate his flow 

however I suspect that he will require an arteriogram with possible intervention

 to improve flow to the right foot at some point.  Given the acute renal failure

 and stable dry gangrene this may not be performed in the hospital setting at 

this time.  If it is determined that he is in end-stage renal disease and 

recovery of his renal function is unlikely then it is possible that he may 

undergo the procedure during the setting.  We will follow-up the results of the 

ultrasound to make any further recommendations.  I discussed this plan with the 

patient who expressed understanding and agrees.

## 2022-07-28 NOTE — VASCULAR LAB REPORT
DUPLEX DOPPLER LOWER EXTREMITY ARTERIAL, BILATERAL



INDICATION / CLINICAL INFORMATION: Right first toe gangrene.



TECHNIQUE: Arterial duplex examination of both lower extremities performed using B-mode, color flow a
nd spectral Doppler assessment.



FINDINGS: 



RIGHT: Occlusion of the right anterior tibial artery with intraluminal thrombus. Occlusion of the rig
ht dorsalis pedis artery. Multifocal atherosclerosis.

Common Femoral Artery: PSV 36 cm/sec.  Monophasic waveform.

Proximal SFA: PSV 33 cm/sec.  Monophasic waveform.

Mid SFA: PSV 35 cm/sec.  Monophasic waveform.

Distal SFA: PSV 37 cm/sec.  Monophasic waveform.

Popliteal Artery: PSV 18 cm/sec.  Monophasic waveform.

Posterior Tibial Artery: PSV 22 cm/sec.  Monophasic waveform.

Dorsalis Pedis Artery: Occluded 



LEFT: Multifocal atherosclerosis without occlusion.

Common Femoral Artery:  cm/sec.  Triphasic waveform.

Proximal SFA:  cm/sec.  Triphasic waveform.

Mid SFA: PSV 48 cm/sec.  Biphasic waveform.

Distal SFA: PSV 72 cm/sec.  Monophasic waveform.

Popliteal Artery: PSV 34 cm/sec.  Monophasic waveform.

Posterior Tibial Artery: PSV 21 cm/sec.  Monophasic waveform.

Dorsalis Pedis Artery: PSV 22 cm/sec.  Monophasic waveform.



ADDITIONAL FINDINGS: None.



VIVIAN measurements not performed.



IMPRESSION:

1. Occlusion of the right posterior tibialis artery and dorsalis pedis artery.

2. Multifocal right greater than left lower extremity atherosclerosis with diffusely monophasic wavef
orms on the right suggestive of at least a moderate grade inflow right iliac artery stenosis. 

3. Monophasic waveforms develop at the level of the distal left SFA likely secondary to a mid to dist
al SFA moderate grade stenosis. No left lower extremity arterial occlusion or focal elevated velocity
.



======================

Ankle-Brachial Index (VIVIAN): 

======================

- Calcified arteries > 1.4 

- Normal = 0.9-1.4 

- Mild PAD = 0.7-0.89

- Moderate PAD = 0.51-0.69 

- Severe PAD < 0.5



======================

Doppler Waveform: 

======================

- Triphasic is normal. 

- Biphasic is abnormal if clear transition from triphasic signal along vascular tree.

- Monophasic is abnormal.



Signer Name: Gurpreet Torres MD 

Signed: 7/28/2022 5:26 PM

Workstation Name: [employer]

## 2022-07-28 NOTE — PROGRESS NOTE
Assessment and Plan


Assessment and plan: 








This is a 42-year-old male with HTN, right-sided CVA with residual left-sided 

hemiplegia and bedridden's, HFrEF, MI s/p stent placement, DM, asthma, COPD, 

current nicotine abuse, A. fib and noncompliance with A. fib with RVR, acute 

kidney injury with hypoglycemia and severe hypercalcemia





Neuro: Acute metabolic encephalopathy, h/o right-sided CVA with residual left-

sided hemiplegia and bedridden


-Reorientation as needed


-Maintain sleep-wake cycle


-As needed analgesia


-Neurology consulted, appreciate recommendations


   -consulted for guidance re anticoagulation per cards request


-PT/OT consulted





Cardiac: A. fib, Acute on Chronic Systolic HF, h/o MI s/p stent placement,  

nonischemic cardiomyopathy, chronic A. fib


-Cardiology consulted, appreciate recommendations


-Blood pressure monitoring per protocol


-Echocardiogram shows LVEF of 20 to 25%, severe hypokinesis of septal wall and 

apex


-Digoxin discontinued


-Amio bolus and gtt along with PO


-Per cards: Due to low EF did not recommend diltiazem at this time, No ACE/ARB 

due to renal function


-Admit proBNP [id number]





Respiratory: h/o COPD


-Currently on room air


-CCM consulted, appreciate recommendations


-Supplemental oxygen as needed


-SPO2 monitoring


-Pulmonary hygiene





GI: Morbid obesity, mild protein calorie malnutrition


-24 hours +1967 mL


-PPI


-Renal cardiac CC diet with supplement


-BR: Colace





: Acute kidney injury, hyponatremia, hypochloremia,


-Nephrology consulted, appreciate recommendations


-HD catheter placed 7/26


-HD per nephrology 


-Record intake and output


-Renally dose medications


-Avoid nephrotoxic medications


-Urine lites pending


-Renal ultrasound consistent with medical renal disease


-Replete Mg


-Trend BMP





ID: SIRS, r/o Sepsis, Dry gangrene?


-Vascular surgery consulted, appreciate recommendations


-VIVIAN pending


-Tmax 101.5, tachycardia, leukocystosis, CARLEY


-UA pending


-BC x 2 pending


-cxr shows venous congestion


-WOCN consulted for right great toe


-Started on rocephin


-f/u blood culture


-Monitor WBC and temperature curve





Endo: Hypoglycemia,  h/o DM


-Avoid hypoglycemia


-s/p D10 gtt


-Accu-Cheks q.6





Heme: Leukocytosis


-Trend CBC


-Transfuse hemoglobin less than 7


-SCDs to BLE while in bed











The high probability of a clinically significant, sudden or life threatening 

deterioration of the [multi] system(s) required my full and direct attention, 

intervention and personal management. The aggregate critical care time was [60] 

minutes. This time is in addition to time spent performing reported procedures 

but includes the following: 





[x] Data Review and interpretation 





[x] Patient assessment and monitoring of vital signs 





[x] Documentation 





[x] Medication orders and management





Disposition Plan: imcu


Total Time Spent with Patient (Minutes): 60





History


Interval history: 


This is a 42-year-old male with HTN, right CVA (1/2021) with residual left-sided

 hemiplegia and bedridden, heart failure with reduced EF (EF 20 to 25% in 2013),

 MI in 2016, s/p stent placement in 2017, DM, COPD, current nicotine abuse, A. 

fib and noncompliance who presented to the emergency department on 7/25 via EMS 

for generalized weakness and low blood glucose.  Per EMS patient's blood glucose

 levels were in the 40s and he complained of inability to urinate for 2 days and

 shortness of breath.  Patient was admitted to [facility] in January 2021 

and is status BUN/creatinine on 5/27/2020 was noted to be 18/1.0.  Patient 

presented to emergency department with fever of 99, , /90, lab work 

showed leukocytosis, hyponatremia at 128, hyperkalemia at 7.2, metabolic 

acidosis of 17, elevated BUN/creatinine at 107/15.7 and hyperglycemia 54.  

Patient was medically treated with calcium gluconate and bicarbonate, received 2

 L normal saline bolus and it was noted that patient refused Zamarripa catheter 

placement.  Nephrology was consulted in the emergency department.  Patient was 

admitted to the hospitalist service with hypertensive emergency, hypoglycemia, 

hyperkalemia, acute kidney injury, A. fib RVR and CHF with consults to [facility] [place] and 

cardiology.





Hospital course to date:





7/26: Vas-Cath placed for emergent hemodialysis for which she received.  RN 

asked to BladderScan the patient for possible placement of Zamarripa catheter.  

Renal ultrasound pending.  Sodium bicarbonate drip was discontinued due to 

bicarbonate improvement into the 20s and D10 was decreased to 75.  Digoxin 

discontinued.  Neurology consulted for guidance for anticoagulation as patient 

was initially not anticoagulated for risk of hemorrhagic conversion of CVA.





7/27: Cardio will start amio as documented allergy is not a true allergy. HD 

again today. DC hydral given and decrease amlodipine re soft BPs. Bladder scan 

revealed no urine. [facility] [place] [facility] will like to continue D10 but decrease rate and will 

start heparin gtt given afib (was on home eliquis) given possible need for 

vascath. 





7/28: Cardiology started patient on p.o. amiodarone to be given along with 

amiodarone drip, hemodialysis today.  Patient started having urine output.  

Vascular surgery consulted for great toe.  Patient will be transferred to [facility].





Hospitalist Physical





- Constitutional


Vitals: 


                                        











Temp Pulse Resp BP Pulse Ox


 


 98.6 F   125 H  15   108/79   97 


 


 07/28/22 13:43  07/28/22 14:00  07/28/22 14:00  07/28/22 14:00  07/28/22 14:00











General appearance: Present: no acute distress, other (On hemodialysis during 

the exam)





- EENT


Eyes: Present: PERRL, EOM intact


ENT: hearing intact, clear oral mucosa





- Neck


Neck: Present: normal ROM





- Respiratory


Respiratory effort: normal


Respiratory: bilateral: diminished





- Cardiovascular


Rhythm: regular


Heart Sounds: Present: S1 & S2.  Absent: systolic murmur, diastolic murmur





- Extremities


Extremities: no ischemia, pulses intact, pulses symmetrical, No edema, normal 

temperature, normal color


Peripheral Pulses: within normal limits





- Abdominal


General gastrointestinal: soft, non-tender, non-distended, normal bowel sounds





- Integumentary


Integumentary: Present: warm, dry





- Psychiatric


Psychiatric: cooperative





- Neurologic


Neurologic: CNII-XII intact, no focal deficits, moves all extremities





- Allied Health


Allied health notes reviewed: nursing, RT, social work





HEART Score





- HEART Score


Age: 45-65


Risk factors: > 3 risk factors or hx of atherosclerotic disease


Troponin: 1-3x normal limit





- Critical Actions


Critical Actions: 4-6 pts:12-16.6% risk of adverse cardiac event. Should be 

admitted





Results





- Labs


CBC & Chem 7: 


                                 07/28/22 04:03





                                 07/28/22 04:03


Labs: 


                             Laboratory Last Values











WBC  15.5 K/mm3 (4.5-11.0)  H  07/28/22  04:03    


 


RBC  3.93 M/mm3 (3.65-5.03)   07/28/22  04:03    


 


Hgb  12.2 gm/dl (11.8-15.2)   07/28/22  04:03    


 


Hct  34.9 % (35.5-45.6)  L  07/28/22  04:03    


 


MCV  89 fl (84-94)   07/28/22  04:03    


 


MCH  31 pg (28-32)   07/28/22  04:03    


 


MCHC  35 % (32-34)  H  07/28/22  04:03    


 


RDW  14.8 % (13.2-15.2)   07/28/22  04:03    


 


Plt Count  255 K/mm3 (140-440)   07/28/22  04:03    


 


Add Manual Diff  Complete   07/26/22  04:16    


 


Total Counted  100   07/26/22  04:16    


 


Seg Neuts % (Manual)  78.0 % (40.0-70.0)  H  07/26/22  04:16    


 


Band Neutrophils %  0 %  07/26/22  04:16    


 


Lymphocytes % (Manual)  11.0 % (13.4-35.0)  L  07/26/22  04:16    


 


Reactive Lymphs % (Man)  0 %  07/26/22  04:16    


 


Monocytes % (Manual)  11.0 % (0.0-7.3)  H  07/26/22  04:16    


 


Eosinophils % (Manual)  0 % (0.0-4.3)   07/26/22  04:16    


 


Basophils % (Manual)  0 % (0.0-1.8)   07/26/22  04:16    


 


Metamyelocytes %  0 %  07/26/22  04:16    


 


Myelocytes %  0 %  07/26/22  04:16    


 


Promyelocytes %  0 %  07/26/22  04:16    


 


Blast Cells %  0 %  07/26/22  04:16    


 


Nucleated RBC %  Not Reportable   07/26/22  04:16    


 


Seg Neutrophils # Man  12.5 K/mm3 (1.8-7.7)  H  07/26/22  04:16    


 


Band Neutrophils #  0.0 K/mm3  07/26/22  04:16    


 


Lymphocytes # (Manual)  1.8 K/mm3 (1.2-5.4)   07/26/22  04:16    


 


Abs React Lymphs (Man)  0.0 K/mm3  07/26/22  04:16    


 


Monocytes # (Manual)  1.8 K/mm3 (0.0-0.8)  H  07/26/22  04:16    


 


Eosinophils # (Manual)  0.0 K/mm3 (0.0-0.4)   07/26/22  04:16    


 


Basophils # (Manual)  0.0 K/mm3 (0.0-0.1)   07/26/22  04:16    


 


Metamyelocytes #  0.0 K/mm3  07/26/22  04:16    


 


Myelocytes #  0.0 K/mm3  07/26/22  04:16    


 


Promyelocytes #  0.0 K/mm3  07/26/22  04:16    


 


Blast Cells #  0.0 K/mm3  07/26/22  04:16    


 


WBC Morphology  Not Reportable   07/26/22  04:16    


 


Hypersegmented Neuts  Not Reportable   07/26/22  04:16    


 


Hyposegmented Neuts  Not Reportable   07/26/22  04:16    


 


Hypogranular Neuts  Not Reportable   07/26/22  04:16    


 


Smudge Cells  Not Reportable   07/26/22  04:16    


 


Toxic Granulation  Not Reportable   07/26/22  04:16    


 


Toxic Vacuolation  Not Reportable   07/26/22  04:16    


 


Dohle Bodies  Not Reportable   07/26/22  04:16    


 


Pelger-Huet Anomaly  Not Reportable   07/26/22  04:16    


 


Cb Rods  Not Reportable   07/26/22  04:16    


 


Platelet Estimate  Consistent w auto   07/26/22  04:16    


 


Clumped Platelets  Not Reportable   07/26/22  04:16    


 


Plt Clumps, EDTA  Not Reportable   07/26/22  04:16    


 


Large Platelets  Not Reportable   07/26/22  04:16    


 


Giant Platelets  Not Reportable   07/26/22  04:16    


 


Platelet Satelliting  Not Reportable   07/26/22  04:16    


 


Plt Morphology Comment  Not Reportable   07/26/22  04:16    


 


RBC Morphology  Not Reportable   07/26/22  04:16    


 


Dimorphic RBCs  Not Reportable   07/26/22  04:16    


 


Polychromasia  Not Reportable   07/26/22  04:16    


 


Hypochromasia  Not Reportable   07/26/22  04:16    


 


Poikilocytosis  Not Reportable   07/26/22  04:16    


 


Anisocytosis  Not Reportable   07/26/22  04:16    


 


Microcytosis  Not Reportable   07/26/22  04:16    


 


Macrocytosis  Not Reportable   07/26/22  04:16    


 


Spherocytes  Not Reportable   07/26/22  04:16    


 


Pappenheimer Bodies  Not Reportable   07/26/22  04:16    


 


Sickle Cells  Not Reportable   07/26/22  04:16    


 


Target Cells  Not Reportable   07/26/22  04:16    


 


Tear Drop Cells  Not Reportable   07/26/22  04:16    


 


Ovalocytes  Not Reportable   07/26/22  04:16    


 


Helmet Cells  Not Reportable   07/26/22  04:16    


 


Ge-Crucible Bodies  Not Reportable   07/26/22  04:16    


 


Cabot Rings  Not Reportable   07/26/22  04:16    


 


Ariel Cells  Not Reportable   07/26/22  04:16    


 


Bite Cells  Not Reportable   07/26/22  04:16    


 


Crenated Cell  Not Reportable   07/26/22  04:16    


 


Elliptocytes  Not Reportable   07/26/22  04:16    


 


Acanthocytes (Spur)  Not Reportable   07/26/22  04:16    


 


Rouleaux  Not Reportable   07/26/22  04:16    


 


Hemoglobin C Crystals  Not Reportable   07/26/22  04:16    


 


Schistocytes  Not Reportable   07/26/22  04:16    


 


Malaria parasites  Not Reportable   07/26/22  04:16    


 


Justin Bodies  Not Reportable   07/26/22  04:16    


 


Hem Pathologist Commnt  No   07/26/22  04:16    


 


PT  15.8 Sec. (12.2-14.9)  H  07/27/22  12:54    


 


INR  1.13  (0.87-1.13)   07/27/22  12:54    


 


APTT  33.5 Sec. (24.2-36.6)   07/27/22  12:54    


 


Heparin Anti-Xa Level  0.25 U.I./ml (0.3-0.7)  L  07/28/22  06:15    


 


Sodium  132 mmol/L (137-145)  L  07/28/22  04:03    


 


Potassium  4.9 mmol/L (3.6-5.0)   07/28/22  04:03    


 


Chloride  89.9 mmol/L ()  L  07/28/22  04:03    


 


Carbon Dioxide  25 mmol/L (22-30)   07/28/22  04:03    


 


Anion Gap  22 mmol/L  07/28/22  04:03    


 


BUN  67 mg/dL (9-20)  H  07/28/22  04:03    


 


Creatinine  11.5 mg/dL (0.8-1.3)  H  07/28/22  04:03    


 


Estimated GFR  6 ml/min  07/28/22  04:03    


 


BUN/Creatinine Ratio  6 %  07/28/22  04:03    


 


Glucose  157 mg/dL ()  H  07/28/22  04:03    


 


POC Glucose  191 mg/dL ()  H  07/28/22  11:42    


 


Calcium  8.6 mg/dL (8.4-10.2)   07/28/22  04:03    


 


Phosphorus  7.40 mg/dL (2.5-4.5)  H  07/27/22  04:00    


 


Magnesium  1.50 mg/dL (1.7-2.3)  L  07/27/22  04:00    


 


Total Bilirubin  0.70 mg/dL (0.1-1.2)   07/27/22  04:00    


 


AST  65 units/L (5-40)  H  07/27/22  04:00    


 


ALT  36 units/L (7-56)   07/27/22  04:00    


 


Alkaline Phosphatase  129 units/L ()   07/27/22  04:00    


 


NT-Pro-B Natriuret Pep  99727 pg/mL (0-450)  H  07/26/22  04:16    


 


Total Protein  6.5 g/dL (6.3-8.2)   07/27/22  04:00    


 


Albumin  2.6 g/dL (3.9-5)  L  07/27/22  04:00    


 


Albumin/Globulin Ratio  0.7 %  07/27/22  04:00    


 


Urine Creatinine  62.6 mg/dL (0.1-20.0)  H  07/28/22  07:10    


 


Urine Sodium  26 mmol/L  07/28/22  07:10    


 


Urine Total Protein  15 mg/dL (5-11.8)  H  07/28/22  07:10    


 


Hepatitis A IgM Ab  Non-reactive  (NonReactive)   07/26/22  11:10    


 


Hep Bs Antigen  Non-reactive  (Negative)   07/26/22  11:10    


 


Hep B Core IgM Ab  Non-reactive  (NonReactive)   07/26/22  11:10    


 


Hepatitis C Antibody  Non-reactive  (NonReactive)   07/26/22  11:10    











Microbiology: 


Microbiology





07/26/22 18:00   Peripheral/Venous   Blood Culture - Preliminary


                            NO GROWTH AFTER 24 HOURS


07/26/22 18:00   Peripheral/Venous   Blood Culture - Preliminary


                            NO GROWTH AFTER 24 HOURS








Zamarripa/IV: 


                                        





Voiding Method                   Urinal











Active Medications





- Current Medications


Current Medications: 














Generic Name Dose Route Start Last Admin





  Trade Name Freq  PRN Reason Stop Dose Admin


 


Acetaminophen  650 mg  07/25/22 16:54  07/27/22 09:52





  Acetaminophen 325 Mg Tab  PO   650 mg





  Q4H PRN   Administration





  Pain MILD(1-3)/Fever >100.5/HA  


 


Amiodarone HCl  200 mg  07/28/22 11:00  07/28/22 11:47





  Amiodarone 200 Mg Tab  PO   200 mg





  BID LYNDA   Administration


 


Aspirin  325 mg  07/26/22 10:00  07/28/22 09:19





  Aspirin 325 Mg Tab  PO   325 mg





  QDAY LYNDA   Administration


 


Dextrose  50 ml  07/26/22 10:45 





  Dextrose 50% In Water (25gm) 50 Ml Syringe  IV  





  Q30MIN PRN  





  Hypoglycemia  





  Protocol  


 


Glucagon  1 mg  07/25/22 15:39  07/25/22 16:08





  Glucagon (Human Recombinant) 1 Mg/Ml Inj  IV   1 mg





  Q1H PRN   Administration





  Hypoglycemia  


 


Hydralazine HCl  10 mg  07/25/22 18:41 





  Hydralazine 20 Mg/1 Ml Inj  IV  





  Q2H PRN  





  Blood Pressure  


 


Sodium Chloride  100 mls @ 999 mls/hr  07/26/22 07:08 





  Nacl 0.9%  IV  





  CASSIDY PRN  





  Hypotension  


 


Amiodarone HCl 900 mg/  500 mls @ 33.333 mls/hr  07/27/22 11:15  07/28/22 10:00





  Dextrose  IV   0.5 mg/min





  AS DIRECT LYNDA   16.667 mls/hr





    Administration





  Protocol  





  1 MG/MIN  


 


Heparin Sodium/Sodium Chloride  25,000 unit in 500 mls @ 30 mls/hr  07/27/22 

12:00  07/28/22 06:46





  Heparin/ 0.45% Nacl-25,000 Unit/500 Ml  IV   1,350 units/hr





  TITR LYNDA   27 mls/hr





    Titration





  Protocol  





  1,500 UNITS/HR  


 


Ceftriaxone Sodium  2 gm in 100 mls @ 200 mls/hr  07/27/22 12:00  07/28/22 13:54





  Rocephin/Ns 2 Gm/100 Ml  IV   200 mls/hr





  Q24H LYNDA   Administration





  Protocol  


 


Insulin Human Regular  0 units  07/28/22 13:00  07/28/22 12:22





  Insulin Regular, Human 100 Units/1 Ml  SUB-Q   1 units





  Q6H LYNDA   Administration





  Protocol  


 


Morphine Sulfate  2 mg  07/25/22 16:54  07/27/22 13:48





  Morphine 2 Mg/1 Ml Inj  IV   2 mg





  Q4H PRN   Administration





  Pain, Moderate (4-6)  


 


Ondansetron HCl  4 mg  07/27/22 12:00  07/27/22 13:48





  Ondansetron 4 Mg/2 Ml Inj  IV   4 mg





  Q6H PRN   Administration





  Nausea And Vomiting  


 


Oxycodone/Acetaminophen  1 tab  07/25/22 16:54  07/28/22 11:47





  Oxycodone /Acetaminophen 5-325mg Tab  PO   1 tab





  Q6H PRN   Administration





  Pain, Moderate (4-6)  


 


Sodium Chloride  10 ml  07/25/22 22:00  07/28/22 09:22





  Sodium Chloride 0.9% 10 Ml Flush Syringe  IV   10 ml





  BID LYNDA   Administration


 


Sodium Chloride  10 ml  07/25/22 16:54 





  Sodium Chloride 0.9% 10 Ml Flush Syringe  IV  





  PRN PRN  





  LINE FLUSH

## 2022-07-28 NOTE — XRAY REPORT
Right leg-4 views

Right foot-2 views



INDICATION:  pain.



COMPARISON:  None available.





IMPRESSION:  No acute fracture in the right leg or right foot. There appears to be a small soft tissu
e wound along the medial aspect of the great toe distal phalanx with no gross underlying bone destruc
tion or periostitis to suggest osteomyelitis.  Normal alignment. Mild tricompartmental DJD in the kne
e and degenerative changes in the forefoot with enthesopathic change along the calcaneal tuberosity. 
 Remaining soft tissues are unremarkable.



Signer Name: Hakan Wright MD 

Signed: 7/28/2022 2:22 PM

Workstation Name: [id number]

## 2022-07-28 NOTE — XRAY REPORT
Right leg-4 views

Right foot-2 views



INDICATION:  pain.



COMPARISON:  None available.





IMPRESSION:  No acute fracture in the right leg or right foot. There appears to be a small soft tissu
e wound along the medial aspect of the great toe distal phalanx with no gross underlying bone destruc
tion or periostitis to suggest osteomyelitis.  Normal alignment. Mild tricompartmental DJD in the kne
e and degenerative changes in the forefoot with enthesopathic change along the calcaneal tuberosity. 
 Remaining soft tissues are unremarkable.



Signer Name: Hakan Wright MD 

Signed: 7/28/2022 2:22 PM

Workstation Name: [id number]

## 2022-07-28 NOTE — CONSULTATION
History of Present Illness


Consult date: 07/27/22


Reason for Consult: anticoagulation on afib


Chief complaint: 


Weakness with low BGL


History of present illness: 





41 yo male with hx of ishcemic stroke then hemorrhagic stroke w/ residual left 

hemiparesis/plegia, chf, afib (on eliquis), hypertension, who presented for 

generalized weakness in the setting of BGL in the 40s.





Patient notes that the hemorrhagic stroke occurred while taking eliquis but it 

was also associated with a fall.  He is followed by [facility].





Past History


Past Medical History: atrial fib, heart failure, hypertension, renal failure, 

stroke


Past Surgical History: PTCA


Social history: denies: smoking, alcohol abuse, prescription drug abuse, IV drug

use


Family history: hypertension





Medications and Allergies


                                    Allergies











Allergy/AdvReac Type Severity Reaction Status Date / Time


 


adhesive Allergy  Rash Verified 07/25/22 14:07


 


ibuprofen [From Motrin] Allergy  Swelling Verified 07/25/22 14:07


 


lisinopril Allergy  COUGH Verified 07/25/22 14:07


 


metoprolol Allergy  Nausea Verified 07/25/22 14:07


 


amiodarone AdvReac  Nausea Verified 07/25/22 14:07


 


Iodinated Contrast Media AdvReac  KIDNEYS Verified 07/25/22 14:07





[Iodinated Contrast Media -   SHUT DOWN  





IV Dye]     











                                Home Medications











 Medication  Instructions  Recorded  Confirmed  Last Taken  Type


 


Digoxin [Lanoxin] 0.25 mg PO DAILY@1700 #30 tablet 01/05/15 06/27/17 06/26/17 Rx


 


Aspirin 325 mg PO QDAY 06/27/17 06/27/17 06/26/17 History


 


Furosemide [Lasix TAB] 40 mg PO QDAY 06/27/17 06/27/17 06/26/17 History


 


Losartan [Cozaar] 50 mg PO QDAY 06/27/17 06/27/17 06/26/17 History


 


HYDROcodone/APAP 5-325 [[place] 1 each PO Q6HR PRN #20 tablet 02/27/20  Unknown Rx





5/325]     


 


Penicillin Vk [Veetids TAB] 500 mg PO QID #21 tablet 02/27/20  Unknown Rx


 


Acetaminophen [Acetaminophen TAB] 1,000 mg PO Q6HR #30 tablet 05/27/20  Unknown 

Rx


 


cephALEXin [Keflex] 500 mg PO Q8HR #30 cap 05/27/20  Unknown Rx











Active Meds: 


Active Medications





Acetaminophen (Acetaminophen 325 Mg Tab)  650 mg PO Q4H PRN


   PRN Reason: Pain MILD(1-3)/Fever >100.5/HA


   Last Admin: 07/27/22 09:52 Dose:  650 mg


   


Amlodipine Besylate (Amlodipine 5 Mg Tab)  5 mg PO QDAY LYNDA


   Last Admin: 07/27/22 09:36 Dose:  5 mg


   


Aspirin (Aspirin 325 Mg Tab)  325 mg PO QDAY LYNDA


   Last Admin: 07/27/22 09:34 Dose:  325 mg


   


Dextrose (Dextrose 50% In Water (25gm) 50 Ml Syringe)  50 ml IV Q30MIN PRN; 

Protocol


   PRN Reason: Hypoglycemia


Glucagon (Glucagon (Human Recombinant) 1 Mg/Ml Inj)  1 mg IV Q1H PRN


   PRN Reason: Hypoglycemia


   Last Admin: 07/25/22 16:08 Dose:  1 mg


   


Hydralazine HCl (Hydralazine 20 Mg/1 Ml Inj)  10 mg IV Q2H PRN


   PRN Reason: Blood Pressure


Sodium Chloride (Nacl 0.9%)  100 mls @ 999 mls/hr IV CASSIDY PRN


   PRN Reason: Hypotension


Amiodarone HCl 900 mg/ (Dextrose)  500 mls @ 33.333 mls/hr IV AS DIRECT LYNDA; 

Protocol


   Last Infusion: 07/28/22 06:03 Dose:  0.5 mg/min, 16.667 mls/hr


   


Heparin Sodium/Sodium Chloride (Heparin/ 0.45% Nacl-25,000 Unit/500 Ml)  25,000 

unit in 500 mls @ 30 mls/hr IV TITR LYNDA; Protocol


   Last Titration: 07/28/22 06:46 Dose:  1,350 units/hr, 27 mls/hr


   


Ceftriaxone Sodium (Rocephin/Ns 2 Gm/100 Ml)  2 gm in 100 mls @ 200 mls/hr IV 

Q24H LYNDA; Protocol


   Last Admin: 07/27/22 13:16 Dose:  200 mls/hr


   


Morphine Sulfate (Morphine 2 Mg/1 Ml Inj)  2 mg IV Q4H PRN


   PRN Reason: Pain, Moderate (4-6)


   Last Admin: 07/27/22 13:48 Dose:  2 mg


   


Ondansetron HCl (Ondansetron 4 Mg/2 Ml Inj)  4 mg IV Q6H PRN


   PRN Reason: Nausea And Vomiting


   Last Admin: 07/27/22 13:48 Dose:  4 mg


   


Oxycodone/Acetaminophen (Oxycodone /Acetaminophen 5-325mg Tab)  1 tab PO Q6H PRN


   PRN Reason: Pain, Moderate (4-6)


   Last Admin: 07/27/22 23:18 Dose:  1 tab


   


Sodium Chloride (Sodium Chloride 0.9% 10 Ml Flush Syringe)  10 ml IV BID LYNDA


   Last Admin: 07/27/22 21:40 Dose:  10 ml


   


Sodium Chloride (Sodium Chloride 0.9% 10 Ml Flush Syringe)  10 ml IV PRN PRN


   PRN Reason: LINE FLUSH











Review of Systems


All systems: negative (as per hpi;)





Physical Examination





- Vital Signs


Vital Signs: 


                                   Vital Signs











Temp Pulse Resp BP Pulse Ox


 


 99.0 F   120 H  16   150/90   98 


 


 07/25/22 11:52  07/25/22 11:52  07/25/22 11:52  07/25/22 11:52  07/25/22 11:52














- Physical Exam


Narrative exam: 


Gen: nad, well-nourished;


Head: normocephalic;


Eyes: no gaze deviation; no ptosis;


ENT:  normal vocalization;


CVS: warm and well-perfused;


Pulm: no respiratory distress;


GI: appears non-distended;


Ext: no cyanosis appreciated at distal extremities;


Skin: no acute rash at distal extremities;


Heme: no pathologic ecchymosis appreciated at distal extremities;


Neuro: alert, oriented to name, age, month, year, surroundings, mild dysarthria,

 no aphasia, CN 2 - PERRL, visual fields grossly intact, CN 3, 4, 6 - EOMI, CN 5

 - facial sensation symmetric to light touch, CN 7 - facial movement decreased 

on the left, CN 8 - hearing grossly intact, CN 9, 10 - uvula midline, CN 11  

decreased left shoulder movement, CN 12 - tongue midline; Motor - at least 4/5 

at right exts; at least 1/5 at proximal left arm; 0/5 at distal left arm; at 

least 2-/5 at proximal left leg; at least 1/5 at left leg; Sensory - light touch

 symmetric, Cerebellar - fnf intact on right, Gait - deferred secondary to fall 

risk;





Results





- Laboratory Findings


CBC and BMP: 


                                 07/28/22 04:03





                                 07/28/22 04:03


Abnormal Lab Findings: 


                                  Abnormal Labs











  07/25/22 07/25/22 07/25/22





  12:30 12:46 13:19


 


WBC    15.3 H


 


RBC    5.24 H


 


Hgb    15.7 H


 


Hct    47.0 H


 


MCHC   


 


Seg Neuts % (Manual)   


 


Lymphocytes % (Manual)   


 


Monocytes % (Manual)   


 


Seg Neutrophils # Man   


 


Monocytes # (Manual)   


 


PT   


 


Heparin Anti-Xa Level   


 


Sodium   


 


Potassium   


 


Chloride   


 


Carbon Dioxide   


 


BUN   


 


Creatinine   


 


Glucose   


 


POC Glucose  28 L  44 L 


 


Calcium   


 


Phosphorus   


 


Magnesium   


 


AST   


 


Alkaline Phosphatase   


 


NT-Pro-B Natriuret Pep   


 


Albumin   


 


Urine Creatinine   


 


Urine Total Protein   














  07/25/22 07/25/22 07/25/22





  13:19 13:33 14:01


 


WBC   


 


RBC   


 


Hgb   


 


Hct   


 


MCHC   


 


Seg Neuts % (Manual)   


 


Lymphocytes % (Manual)   


 


Monocytes % (Manual)   


 


Seg Neutrophils # Man   


 


Monocytes # (Manual)   


 


PT   


 


Heparin Anti-Xa Level   


 


Sodium  128 L  


 


Potassium  7.2 H*  


 


Chloride  93.1 L  


 


Carbon Dioxide  17 L  


 


BUN  107 H  


 


Creatinine  15.7 H  


 


Glucose  54 L  


 


POC Glucose   67 L  57 L


 


Calcium   


 


Phosphorus   


 


Magnesium   


 


AST  61 H  


 


Alkaline Phosphatase  138 H  


 


NT-Pro-B Natriuret Pep   


 


Albumin  3.1 L  


 


Urine Creatinine   


 


Urine Total Protein   














  07/25/22 07/25/22 07/25/22





  14:57 15:31 16:58


 


WBC   


 


RBC   


 


Hgb   


 


Hct   


 


MCHC   


 


Seg Neuts % (Manual)   


 


Lymphocytes % (Manual)   


 


Monocytes % (Manual)   


 


Seg Neutrophils # Man   


 


Monocytes # (Manual)   


 


PT   


 


Heparin Anti-Xa Level   


 


Sodium   


 


Potassium   


 


Chloride   


 


Carbon Dioxide   


 


BUN   


 


Creatinine   


 


Glucose   


 


POC Glucose  47 L  52 L  49 L


 


Calcium   


 


Phosphorus   


 


Magnesium   


 


AST   


 


Alkaline Phosphatase   


 


NT-Pro-B Natriuret Pep   


 


Albumin   


 


Urine Creatinine   


 


Urine Total Protein   














  07/25/22 07/25/22 07/26/22





  22:33 23:53 00:56


 


WBC   


 


RBC   


 


Hgb   


 


Hct   


 


MCHC   


 


Seg Neuts % (Manual)   


 


Lymphocytes % (Manual)   


 


Monocytes % (Manual)   


 


Seg Neutrophils # Man   


 


Monocytes # (Manual)   


 


PT   


 


Heparin Anti-Xa Level   


 


Sodium  130 L   130 L


 


Potassium  7.8 H*   7.2 H*


 


Chloride  92.6 L   92.2 L


 


Carbon Dioxide  17 L   17 L


 


BUN  110 H   111 H


 


Creatinine  16.5 H   16.0 H


 


Glucose  49 L   103 H


 


POC Glucose   49 L 


 


Calcium   


 


Phosphorus   


 


Magnesium   


 


AST   


 


Alkaline Phosphatase   


 


NT-Pro-B Natriuret Pep   


 


Albumin   


 


Urine Creatinine   


 


Urine Total Protein   














  07/26/22 07/26/22 07/26/22





  03:32 04:13 04:16


 


WBC    16.0 H


 


RBC   


 


Hgb   


 


Hct   


 


MCHC   


 


Seg Neuts % (Manual)    78.0 H


 


Lymphocytes % (Manual)    11.0 L


 


Monocytes % (Manual)    11.0 H


 


Seg Neutrophils # Man    12.5 H


 


Monocytes # (Manual)    1.8 H


 


PT   


 


Heparin Anti-Xa Level   


 


Sodium   


 


Potassium   


 


Chloride   


 


Carbon Dioxide   


 


BUN   


 


Creatinine   


 


Glucose   


 


POC Glucose  62 L  113 H 


 


Calcium   


 


Phosphorus   


 


Magnesium   


 


AST   


 


Alkaline Phosphatase   


 


NT-Pro-B Natriuret Pep   


 


Albumin   


 


Urine Creatinine   


 


Urine Total Protein   














  07/26/22 07/26/22 07/26/22





  04:16 04:16 05:30


 


WBC   


 


RBC   


 


Hgb   


 


Hct   


 


MCHC   


 


Seg Neuts % (Manual)   


 


Lymphocytes % (Manual)   


 


Monocytes % (Manual)   


 


Seg Neutrophils # Man   


 


Monocytes # (Manual)   


 


PT   


 


Heparin Anti-Xa Level   


 


Sodium  127 L  


 


Potassium  7.2 H*  


 


Chloride  92.6 L  


 


Carbon Dioxide  14 L  


 


BUN  113 H  


 


Creatinine  16.1 H  


 


Glucose   


 


POC Glucose    112 H


 


Calcium   


 


Phosphorus   


 


Magnesium   


 


AST  45 H  


 


Alkaline Phosphatase  131 H  


 


NT-Pro-B Natriuret Pep   38047 H 


 


Albumin  2.4 L  


 


Urine Creatinine   


 


Urine Total Protein   














  07/26/22 07/26/22 07/26/22





  11:22 11:27 16:15


 


WBC   


 


RBC   


 


Hgb   


 


Hct   


 


MCHC   


 


Seg Neuts % (Manual)   


 


Lymphocytes % (Manual)   


 


Monocytes % (Manual)   


 


Seg Neutrophils # Man   


 


Monocytes # (Manual)   


 


PT   


 


Heparin Anti-Xa Level   


 


Sodium   133 L  131 L


 


Potassium   5.4 H D 


 


Chloride   93.3 L  91.1 L


 


Carbon Dioxide   21 L D 


 


BUN   96 H  79 H


 


Creatinine   13.6 H  12.1 H


 


Glucose   130 H 


 


POC Glucose  125 H  


 


Calcium   8.0 L  7.9 L


 


Phosphorus   


 


Magnesium   


 


AST   


 


Alkaline Phosphatase   


 


NT-Pro-B Natriuret Pep   


 


Albumin   


 


Urine Creatinine   


 


Urine Total Protein   














  07/26/22 07/26/22 07/26/22





  21:00 21:00 23:57


 


WBC  16.8 H  


 


RBC   


 


Hgb   


 


Hct   


 


MCHC   


 


Seg Neuts % (Manual)   


 


Lymphocytes % (Manual)   


 


Monocytes % (Manual)   


 


Seg Neutrophils # Man   


 


Monocytes # (Manual)   


 


PT   


 


Heparin Anti-Xa Level   


 


Sodium   129 L 


 


Potassium   


 


Chloride   89.7 L 


 


Carbon Dioxide   


 


BUN   83 H 


 


Creatinine   13.0 H 


 


Glucose   


 


POC Glucose    112 H


 


Calcium   7.9 L 


 


Phosphorus   


 


Magnesium   


 


AST   


 


Alkaline Phosphatase   


 


NT-Pro-B Natriuret Pep   


 


Albumin   


 


Urine Creatinine   


 


Urine Total Protein   














  07/26/22 07/27/22 07/27/22





  Unknown 01:04 02:00


 


WBC   


 


RBC   


 


Hgb   


 


Hct   


 


MCHC   


 


Seg Neuts % (Manual)   


 


Lymphocytes % (Manual)   


 


Monocytes % (Manual)   


 


Seg Neutrophils # Man   


 


Monocytes # (Manual)   


 


PT   


 


Heparin Anti-Xa Level   


 


Sodium  129 L  


 


Potassium  7.5 H* D  


 


Chloride  91.8 L  


 


Carbon Dioxide  13 L D  


 


BUN  113 H  


 


Creatinine  16.1 H  


 


Glucose   


 


POC Glucose   117 H  127 H


 


Calcium   


 


Phosphorus   


 


Magnesium   


 


AST   


 


Alkaline Phosphatase   


 


NT-Pro-B Natriuret Pep   


 


Albumin   


 


Urine Creatinine   


 


Urine Total Protein   














  07/27/22 07/27/22 07/27/22





  04:00 05:43 08:34


 


WBC   


 


RBC   


 


Hgb   


 


Hct   


 


MCHC   


 


Seg Neuts % (Manual)   


 


Lymphocytes % (Manual)   


 


Monocytes % (Manual)   


 


Seg Neutrophils # Man   


 


Monocytes # (Manual)   


 


PT   


 


Heparin Anti-Xa Level   


 


Sodium  128 L  


 


Potassium  5.1 H  


 


Chloride  88.0 L  


 


Carbon Dioxide   


 


BUN  84 H  


 


Creatinine  13.5 H  


 


Glucose  139 H  


 


POC Glucose   138 H  195 H


 


Calcium  7.9 L  


 


Phosphorus  7.40 H  


 


Magnesium  1.50 L  


 


AST  65 H  


 


Alkaline Phosphatase   


 


NT-Pro-B Natriuret Pep   


 


Albumin  2.6 L  


 


Urine Creatinine   


 


Urine Total Protein   














  07/27/22 07/27/22 07/27/22





  10:18 12:54 12:54


 


WBC   


 


RBC   


 


Hgb   


 


Hct   


 


MCHC   


 


Seg Neuts % (Manual)   


 


Lymphocytes % (Manual)   


 


Monocytes % (Manual)   


 


Seg Neutrophils # Man   


 


Monocytes # (Manual)   


 


PT   15.8 H 


 


Heparin Anti-Xa Level   


 


Sodium   


 


Potassium   


 


Chloride   


 


Carbon Dioxide   


 


BUN   


 


Creatinine   


 


Glucose   


 


POC Glucose  180 H   208 H


 


Calcium   


 


Phosphorus   


 


Magnesium   


 


AST   


 


Alkaline Phosphatase   


 


NT-Pro-B Natriuret Pep   


 


Albumin   


 


Urine Creatinine   


 


Urine Total Protein   














  07/27/22 07/27/22 07/27/22





  14:35 17:58 23:20


 


WBC   


 


RBC   


 


Hgb   


 


Hct   


 


MCHC   


 


Seg Neuts % (Manual)   


 


Lymphocytes % (Manual)   


 


Monocytes % (Manual)   


 


Seg Neutrophils # Man   


 


Monocytes # (Manual)   


 


PT   


 


Heparin Anti-Xa Level   


 


Sodium   


 


Potassium   


 


Chloride   


 


Carbon Dioxide   


 


BUN   


 


Creatinine   


 


Glucose   


 


POC Glucose  225 H  219 H  197 H


 


Calcium   


 


Phosphorus   


 


Magnesium   


 


AST   


 


Alkaline Phosphatase   


 


NT-Pro-B Natriuret Pep   


 


Albumin   


 


Urine Creatinine   


 


Urine Total Protein   














  07/27/22 07/28/22 07/28/22





  Unknown 04:03 04:03


 


WBC  17.9 H  15.5 H 


 


RBC   


 


Hgb   


 


Hct   34.9 L 


 


MCHC   35 H 


 


Seg Neuts % (Manual)   


 


Lymphocytes % (Manual)   


 


Monocytes % (Manual)   


 


Seg Neutrophils # Man   


 


Monocytes # (Manual)   


 


PT   


 


Heparin Anti-Xa Level   


 


Sodium    132 L


 


Potassium   


 


Chloride    89.9 L


 


Carbon Dioxide   


 


BUN    67 H


 


Creatinine    11.5 H


 


Glucose    157 H


 


POC Glucose   


 


Calcium   


 


Phosphorus   


 


Magnesium   


 


AST   


 


Alkaline Phosphatase   


 


NT-Pro-B Natriuret Pep   


 


Albumin   


 


Urine Creatinine   


 


Urine Total Protein   














  07/28/22 07/28/22 07/28/22





  06:07 06:15 07:10


 


WBC   


 


RBC   


 


Hgb   


 


Hct   


 


MCHC   


 


Seg Neuts % (Manual)   


 


Lymphocytes % (Manual)   


 


Monocytes % (Manual)   


 


Seg Neutrophils # Man   


 


Monocytes # (Manual)   


 


PT   


 


Heparin Anti-Xa Level   0.25 L 


 


Sodium   


 


Potassium   


 


Chloride   


 


Carbon Dioxide   


 


BUN   


 


Creatinine   


 


Glucose   


 


POC Glucose  139 H  


 


Calcium   


 


Phosphorus   


 


Magnesium   


 


AST   


 


Alkaline Phosphatase   


 


NT-Pro-B Natriuret Pep   


 


Albumin   


 


Urine Creatinine    62.6 H


 


Urine Total Protein    15 H














Assessment and Plan


41 yo male with hx of ishcemic stroke then hemorrhagic stroke w/ residual left 

hemiparesis/plegia, chf, afib (on eliquis), hypertension, who presented for 

generalized weakness in the setting of BGL in the 40s.





Patient notes that the hemorrhagic stroke occurred while taking eliquis but it 

was also associated with a fall.  He is followed by [facility].





1.  History of ICH - pt notes this event occurred where he passed out while 

urinating (?micturition syncope) and hit the floor and while on eliquis; pt is 

not able to recall if he was told that the bleed was a traumatic bleed due to 

the fall or whether he had a primary bleed that led to the fall; would defer 

further evaluation to [facility] who has access to these records.





2.  History of Completed (Ischemic) Stroke - concern is raised for embolic event

 in the setting of underlying low-ef chf and afib; if no contraindications (see 

#1), recommend continuing eliquis for secondary stroke prophylaxis, that is 

assuming that the patient has non-valvular afib (?cardiology confirmation).





3.  Hypertension - maintain normotension at all times.





4.  Chronic Left Hemiparesis - continue home regimen of motor rehab.





Miguel Ulloa MD


Neurology


[id number]

## 2022-07-29 LAB
BUN SERPL-MCNC: 31 MG/DL (ref 9–20)
BUN/CREAT SERPL: 8 %
CALCIUM SERPL-MCNC: 8.8 MG/DL (ref 8.4–10.2)
HCT VFR BLD CALC: 37.6 % (ref 35.5–45.6)
HEMOLYSIS INDEX: 5
HGB BLD-MCNC: 12.4 GM/DL (ref 11.8–15.2)
MCHC RBC AUTO-ENTMCNC: 33 % (ref 32–34)
MCV RBC AUTO: 90 FL (ref 84–94)
PLATELET # BLD: 284 K/MM3 (ref 140–440)
RBC # BLD AUTO: 4.16 M/MM3 (ref 3.65–5.03)

## 2022-07-29 RX ADMIN — OXYCODONE AND ACETAMINOPHEN PRN TAB: 5; 325 TABLET ORAL at 02:06

## 2022-07-29 RX ADMIN — HEPARIN SODIUM SCH MLS/HR: 5000 INJECTION, SOLUTION INTRAVENOUS at 00:14

## 2022-07-29 RX ADMIN — INSULIN HUMAN SCH UNITS: 100 INJECTION, SOLUTION PARENTERAL at 00:12

## 2022-07-29 RX ADMIN — AMIODARONE HYDROCHLORIDE SCH MG: 200 TABLET ORAL at 21:32

## 2022-07-29 RX ADMIN — Medication SCH ML: at 00:14

## 2022-07-29 RX ADMIN — OXYCODONE AND ACETAMINOPHEN PRN TAB: 5; 325 TABLET ORAL at 10:58

## 2022-07-29 RX ADMIN — METOPROLOL TARTRATE SCH MG: 25 TABLET, FILM COATED ORAL at 12:30

## 2022-07-29 RX ADMIN — INSULIN HUMAN SCH: 100 INJECTION, SOLUTION PARENTERAL at 06:14

## 2022-07-29 RX ADMIN — CEFTRIAXONE SODIUM SCH: 2 INJECTION, POWDER, FOR SOLUTION INTRAMUSCULAR; INTRAVENOUS at 13:21

## 2022-07-29 RX ADMIN — INSULIN HUMAN SCH: 100 INJECTION, SOLUTION PARENTERAL at 13:21

## 2022-07-29 RX ADMIN — Medication SCH ML: at 11:00

## 2022-07-29 RX ADMIN — METOPROLOL TARTRATE SCH MG: 25 TABLET, FILM COATED ORAL at 21:32

## 2022-07-29 RX ADMIN — AMIODARONE HYDROCHLORIDE SCH MG: 200 TABLET ORAL at 10:58

## 2022-07-29 RX ADMIN — INSULIN HUMAN SCH: 100 INJECTION, SOLUTION PARENTERAL at 19:17

## 2022-07-29 RX ADMIN — ASPIRIN SCH MG: 325 TABLET ORAL at 10:58

## 2022-07-29 RX ADMIN — Medication SCH ML: at 21:32

## 2022-07-29 RX ADMIN — INSULIN HUMAN SCH: 100 INJECTION, SOLUTION PARENTERAL at 21:32

## 2022-07-29 RX ADMIN — AMIODARONE HYDROCHLORIDE SCH MG: 200 TABLET ORAL at 00:12

## 2022-07-29 RX ADMIN — HEPARIN SODIUM SCH MLS/HR: 5000 INJECTION, SOLUTION INTRAVENOUS at 19:20

## 2022-07-29 NOTE — PROGRESS NOTE
Assessment and Plan





Patient is a 42-year-old male with a past medical history of HFrEF, nonischemic 

cardiomyopathy, chronic A. fib, hypertension, COPD, diabetes, history of CVA in 

January 2021 with left-sided hemiplegia and bedridden who came to the ED for 

complaint of weakness and hypoglycemia





Acute renal failure-nephrology following


Altered mental status


Acute on chronic heart failure


Hyperkalemia


Hyponatremia


Hypoglycemia


Hypertension


Cardiomyopathy


Diabetes


History of CVA


A. fib





Echo 11/8/2020-the study was technically difficult in quality.  Arrhythmia was 

noted during the study LVEF is moderately decreased 35-40%. Regional wall motion

abnormalities noted RV moderately dilated. RV systolic function is moderately 

reduced. RVSP is mildly elevated No pericardial effusion No definite or 

significant change in the EF from 10/15/2018 Consider an infiltrative 

cardiomyopathy such as amyloid


Echo 7/26/2022-EF 20 to 25% moderate concentric LVH.  Right ventricle is 

dilated.  Right ventricle hypokinetic.  Left atrium is severely dilated.  Right 

atrium dilated


Per documentation outpatient medications: Losartan 50 mg p.o. daily Lasix 40 mg 

p.o. daily, digoxin 0.25 mg p.o. daily, asa





Plan:





Telemetry reviewed patient is in A. fib rate 110s to 130s


Patient currently on amiodarone drip with no complaints.  Do not believe 

documented amiodarone allergy is a true allergy


Continue amiodarone drip and amiodarone 200 mg p.o. twice daily


Discussed with patient his allergy to metoprolol patient reports nausea.  This 

does not appear to bee a true allergy.  Will initiate metoprolol 25 mg p.o. 

twice daily for further rate control


Patient currently anticoagulated on heparin


Will defer to nephrology recommendations for volume management


No ACE/ARB due to renal function and documented allergy


Discussed plan of care with patient who verbalized understanding and 

acknowledgment





Patient seen in conjunction with Dr. Reid who agrees with plan of care


30 minutes critical care time spent care coordination.








- Patient Problems


(1) CARLEY (acute kidney injury)


Current Visit: Yes   Status: Acute   





(2) Hyperkalemia


Current Visit: Yes   Status: Acute   





(3) Hypoglycemia


Current Visit: Yes   Status: Acute   





(4) Hyponatremia


Current Visit: Yes   Status: Acute   





(5) Acute on chronic systolic CHF (congestive heart failure)


Current Visit: No   Status: Acute   





(6) Atrial fibrillation with RVR


Current Visit: No   Status: Acute   





(7) COPD (chronic obstructive pulmonary disease)


Current Visit: No   Status: Acute   





(8) History of noncompliance with medical treatment


Current Visit: No   Status: Acute   





(9) Obesity


Current Visit: No   Status: Acute   





(10) Cardiomyopathy


Current Visit: No   Status: Chronic   





Subjective


Date of service: 07/29/22


Principal diagnosis: CARLEY 


Interval history: 





Patient resting in bed in no acute distress.  Patient transferred to [facility]


A. fib rates 110s to 130s





Objective


                                   Vital Signs











  Temp Pulse Pulse Resp Resp BP Pulse Ox


 


 07/29/22 06:00   96 H   19   126/87  98


 


 07/29/22 05:00   108 H   14   112/82  95


 


 07/29/22 04:00  97.3 F L  107 H  110 H  13   128/76  93


 


 07/29/22 03:01   118 H   14   121/80  93


 


 07/29/22 02:01   111 H   16   125/85  93


 


 07/29/22 01:00   95 H   16   128/83  95


 


 07/29/22 00:00  98.8 F  107 H  110 H  17   149/91  99


 


 07/28/22 23:59   116 H   18   140/93  98


 


 07/28/22 23:00   109 H   18   140/93  97


 


 07/28/22 22:00   105 H   18   117/51  94


 


 07/28/22 21:01   121 H   15   134/99  95


 


 07/28/22 20:00  98.6 F  110 H  110 H  17   122/88  96


 


 07/28/22 19:00   110 H   16   124/82  92


 


 07/28/22 18:03   117 H   17    95


 


 07/28/22 17:00   142 H   14   130/89  93


 


 07/28/22 16:22   110 H   17    95


 


 07/28/22 16:00  97.8 F  120 H  120 H  17  14   96


 


 07/28/22 15:00   114 H   15   116/72  100


 


 07/28/22 14:00   125 H   15   108/79  97


 


 07/28/22 13:43  98.6 F  121 H   15   114/72 


 


 07/28/22 13:30   102 H     112/78 


 


 07/28/22 13:15   64     116/72 


 


 07/28/22 13:00   121 H   14   113/70  97


 


 07/28/22 12:45   96 H     120/78 


 


 07/28/22 12:30   120 H     111/80 


 


 07/28/22 12:15   96 H     115/87 


 


 07/28/22 12:00  98.8 F  120 H  120 H  18  14  120/79  95


 


 07/28/22 11:47     14   


 


 07/28/22 11:45   96 H     118/77 


 


 07/28/22 11:30   87     128/73 














  Pulse Ox


 


 07/29/22 06:00 


 


 07/29/22 05:00 


 


 07/29/22 04:00 


 


 07/29/22 03:01 


 


 07/29/22 02:01 


 


 07/29/22 01:00 


 


 07/29/22 00:00 


 


 07/28/22 23:59 


 


 07/28/22 23:00 


 


 07/28/22 22:00 


 


 07/28/22 21:01 


 


 07/28/22 20:00 


 


 07/28/22 19:00 


 


 07/28/22 18:03 


 


 07/28/22 17:00 


 


 07/28/22 16:22 


 


 07/28/22 16:00 


 


 07/28/22 15:00 


 


 07/28/22 14:00 


 


 07/28/22 13:43  93


 


 07/28/22 13:30 


 


 07/28/22 13:15 


 


 07/28/22 13:00 


 


 07/28/22 12:45 


 


 07/28/22 12:30 


 


 07/28/22 12:15 


 


 07/28/22 12:00 


 


 07/28/22 11:47 


 


 07/28/22 11:45 


 


 07/28/22 11:30 














- Physical Examination


General: No Apparent Distress


HEENT: Positive: PERRL


Neck: Positive: neck supple


Cardiac: Positive: irregularly irregular


Neuro: Positive: Grossly Intact


Abdomen: Positive: Soft


Skin: Negative: Rash, Suspicious Lesions, Ulceration


Extremities: Present: upper extr. pulses, edema





- Labs and Meds


                                       CBC











  07/29/22 Range/Units





  05:50 


 


WBC  14.8 H  (4.5-11.0)  K/mm3


 


RBC  4.16  (3.65-5.03)  M/mm3


 


Hgb  12.4  (11.8-15.2)  gm/dl


 


Hct  37.6  (35.5-45.6)  %


 


Plt Count  284  (140-440)  K/mm3








                          Comprehensive Metabolic Panel











  07/29/22 Range/Units





  05:50 


 


Sodium  138  (137-145)  mmol/L


 


Potassium  3.9  D  (3.6-5.0)  mmol/L


 


Chloride  99.1  ()  mmol/L


 


Carbon Dioxide  30  (22-30)  mmol/L


 


BUN  31 H  (9-20)  mg/dL


 


Creatinine  4.0 H D  (0.8-1.3)  mg/dL


 


Glucose  158 H  ()  mg/dL


 


Calcium  8.8  (8.4-10.2)  mg/dL














- Imaging and Cardiology


EKG: report reviewed (Atrial fibrillation with heart rate of 120)


Echo: report reviewed





- Telemetry


EKG Rhythm: Atrial Fibrillation





- EKG


Supraventricular dysrhythmia: atrial fibrillation

## 2022-07-29 NOTE — EVENT NOTE
Date: 07/29/22





The patient had an arterial duplex that demonstrated evidence of aortoiliac 

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  He is

in need of a diagnostic angiogram with possible intervention to improve flow to 

his leg and heel his right first toe wounds.  We will continue to follow and 

observe his renal function.  Once he is back to his baseline we will discuss 

with nephrology the potential for the angiogram.

## 2022-07-29 NOTE — PROGRESS NOTE
Assessment and Plan





- Patient Problems


(1) CARLEY (acute kidney injury)


Current Visit: Yes   Status: Acute   


Plan to address problem: 


given refractory hyperkalemia and metabolic acidosis, initiated HD via vascath 

on 7/26/22. s/p daily HD x 3 days for correction of solute clearance, 

hyperkalemia, metabolic acidosis.  renal US showed echogenic kidneys charact

eristic for medical renal disease, no hydronephrosis seen. 


avoid nephrotoxins, NSAIDs, IV contrast.  UOP is significantly increased over 

the last 24hrs, will hold HD for now and monitor I/Os, lytes and renal 

parameters closely, to assess signs of renal recovery. 








(2) Hyperkalemia


Current Visit: Yes   Status: Acute   


Plan to address problem: 


corrected with daily HD. cont 2g K renal diet








(3) CHF (congestive heart failure), NYHA class II


Current Visit: Yes   Status: Chronic   


Qualifiers: 


   Congestive heart failure chronicity: chronic 


Plan to address problem: 


volume control with HD. Echo showed LVEF 20 to 25% moderate concentric LVH.  

Right ventricle is dilated.  Right ventricle hypokinetic.  Left atrium is 

severely dilated.  Right atrium dilated. follow cardiology recommendations 








(4) Hyponatremia


Current Visit: Yes   Status: Acute   


Plan to address problem: 


likely hypervolemic hyponatremia, improved with volume control via HD 








(5) Atrial fibrillation with RVR


Current Visit: No   Status: Acute   


Plan to address problem: 


rate control as per cardiology recommendations. Cleared from renal stand point 

for initiation of eliquis 2.5mg po bid for anticoagulation.








Subjective


Date of service: 07/29/22


Principal diagnosis: CARLEY 


Interval history: 





patient seen in the IMCU, without acute issues, no overnight events reported. 

increased urine output > 5L/24h noted 





Objective





- Vital Signs


Vital signs: 


                               Vital Signs - 12hr











  07/28/22 07/29/22 07/29/22





  23:59 00:00 01:00


 


Temperature  98.8 F 


 


Pulse Rate 116 H 107 H 95 H


 


Pulse Rate [  110 H 





From Monitor]   


 


Respiratory 18 17 16





Rate   


 


Blood Pressure 140/93 149/91 128/83


 


O2 Sat by Pulse 98 99 95





Oximetry   














  07/29/22 07/29/22 07/29/22





  02:01 03:01 04:00


 


Temperature   97.3 F L


 


Pulse Rate 111 H 118 H 107 H


 


Pulse Rate [   110 H





From Monitor]   


 


Respiratory 16 14 13





Rate   


 


Blood Pressure 125/85 121/80 128/76


 


O2 Sat by Pulse 93 93 93





Oximetry   














  07/29/22 07/29/22





  05:00 06:00


 


Temperature  


 


Pulse Rate 108 H 96 H


 


Pulse Rate [  





From Monitor]  


 


Respiratory 14 19





Rate  


 


Blood Pressure 112/82 126/87


 


O2 Sat by Pulse 95 98





Oximetry  














- General Appearance


General appearance: well-developed, well-nourished, appears stated age, obese


EENT: ATNC, PERRL, mucous membranes moist


Neck: no JVD


Respiratory: Present: Clear to Ascultation


Cardiology: regular, S1S2


Gastrointestinal: normoactive bowel sounds


Integumentary: no rash


Neurologic: no focal deficit, alert and oriented x3, strength 5/5, CN 3-12 

intact


Psychiatric: mood/affect appropriate, cooperative





- Lab





                                 07/29/22 05:50





                                 07/29/22 05:50


                             Most recent lab results











Calcium  8.8 mg/dL (8.4-10.2)   07/29/22  05:50    


 


Phosphorus  3.60 mg/dL (2.5-4.5)   07/29/22  05:50    


 


Magnesium  1.80 mg/dL (1.7-2.3)   07/29/22  05:50    


 


Urine Creatinine  62.6 mg/dL (0.1-20.0)  H  07/28/22  07:10    


 


Urine Sodium  26 mmol/L  07/28/22  07:10    


 


Urine Total Protein  15 mg/dL (5-11.8)  H  07/28/22  07:10    














Medications & Allergies





- Medications


Allergies/Adverse Reactions: 


                                    Allergies





adhesive Allergy (Verified 07/25/22 14:07)


   Rash


ibuprofen [From Motrin] Allergy (Verified 07/25/22 14:07)


   Swelling


lisinopril Allergy (Verified 07/25/22 14:07)


   COUGH


metoprolol Allergy (Verified 07/25/22 14:07)


   Nausea


amiodarone Adverse Reaction (Verified 07/25/22 14:07)


   Nausea


Iodinated Contrast Media [Iodinated Contrast Media - IV Dye] Adverse Reaction 

(Verified 07/25/22 14:07)


   KIDNEYS SHUT DOWN








Home Medications: 


                                Home Medications











 Medication  Instructions  Recorded  Confirmed  Last Taken  Type


 


Digoxin [Lanoxin] 0.25 mg PO DAILY@1700 #30 tablet 01/05/15 06/27/17 06/26/17 Rx


 


Aspirin 325 mg PO QDAY 06/27/17 06/27/17 06/26/17 History


 


Furosemide [Lasix TAB] 40 mg PO QDAY 06/27/17 06/27/17 06/26/17 History


 


Losartan [Cozaar] 50 mg PO QDAY 06/27/17 06/27/17 06/26/17 History


 


HYDROcodone/APAP 5-325 [[place] 1 each PO Q6HR PRN #20 tablet 02/27/20  Unknown Rx





5/325]     


 


Penicillin Vk [Veetids TAB] 500 mg PO QID #21 tablet 02/27/20  Unknown Rx


 


Acetaminophen [Acetaminophen TAB] 1,000 mg PO Q6HR #30 tablet 05/27/20  Unknown 

Rx


 


cephALEXin [Keflex] 500 mg PO Q8HR #30 cap 05/27/20  Unknown Rx











Active Medications: 














Generic Name Dose Route Start Last Admin





  Trade Name Freq  PRN Reason Stop Dose Admin


 


Acetaminophen  650 mg  07/25/22 16:54  07/27/22 09:52





  Acetaminophen 325 Mg Tab  PO   650 mg





  Q4H PRN   Administration





  Pain MILD(1-3)/Fever >100.5/HA  


 


Amiodarone HCl  200 mg  07/28/22 11:00  07/29/22 10:58





  Amiodarone 200 Mg Tab  PO   200 mg





  BID LYNDA   Administration


 


Aspirin  325 mg  07/26/22 10:00  07/29/22 10:58





  Aspirin 325 Mg Tab  PO   325 mg





  QDAY LYNDA   Administration


 


Dextrose  50 ml  07/26/22 10:45 





  Dextrose 50% In Water (25gm) 50 Ml Syringe  IV  





  Q30MIN PRN  





  Hypoglycemia  





  Protocol  


 


Glucagon  1 mg  07/25/22 15:39  07/25/22 16:08





  Glucagon (Human Recombinant) 1 Mg/Ml Inj  IV   1 mg





  Q1H PRN   Administration





  Hypoglycemia  


 


Hydralazine HCl  10 mg  07/25/22 18:41 





  Hydralazine 20 Mg/1 Ml Inj  IV  





  Q2H PRN  





  Blood Pressure  


 


Sodium Chloride  100 mls @ 999 mls/hr  07/26/22 07:08 





  Nacl 0.9%  IV  





  CASSIDY PRN  





  Hypotension  


 


Amiodarone HCl 900 mg/  500 mls @ 33.333 mls/hr  07/27/22 11:15  07/28/22 10:00





  Dextrose  IV   0.5 mg/min





  AS DIRECT LYNDA   16.667 mls/hr





    Administration





  Protocol  





  1 MG/MIN  


 


Heparin Sodium/Sodium Chloride  25,000 unit in 500 mls @ 30 mls/hr  07/27/22 

12:00  07/29/22 07:36





  Heparin/ 0.45% Nacl-25,000 Unit/500 Ml  IV   1,500 units/hr





  TITR LYNDA   30 mls/hr





    Titration





  Protocol  





  1,500 UNITS/HR  


 


Ceftriaxone Sodium  2 gm in 100 mls @ 200 mls/hr  07/27/22 12:00  07/28/22 13:54





  Rocephin/Ns 2 Gm/100 Ml  IV  08/01/22 11:59  200 mls/hr





  Q24H LYNDA   Administration





  Protocol  


 


Insulin Human Regular  0 units  07/28/22 13:00  07/29/22 06:14





  Insulin Regular, Human 100 Units/1 Ml  SUB-Q   Not Given





  Q6H [facility]  





  Protocol  


 


Metoprolol Tartrate  25 mg  07/29/22 11:30 





  Metoprolol Tartrate 25 Mg Tab  PO  





  BID [facility]  


 


Morphine Sulfate  2 mg  07/25/22 16:54  07/27/22 13:48





  Morphine 2 Mg/1 Ml Inj  IV   2 mg





  Q4H PRN   Administration





  Pain, Moderate (4-6)  


 


Ondansetron HCl  4 mg  07/27/22 12:00  07/27/22 13:48





  Ondansetron 4 Mg/2 Ml Inj  IV   4 mg





  Q6H PRN   Administration





  Nausea And Vomiting  


 


Oxycodone/Acetaminophen  1 tab  07/25/22 16:54  07/29/22 10:58





  Oxycodone /Acetaminophen 5-325mg Tab  PO   1 tab





  Q6H PRN   Administration





  Pain, Moderate (4-6)  


 


Sodium Chloride  10 ml  07/25/22 22:00  07/29/22 11:00





  Sodium Chloride 0.9% 10 Ml Flush Syringe  IV   10 ml





  BID LYNDA   Administration


 


Sodium Chloride  10 ml  07/25/22 16:54 





  Sodium Chloride 0.9% 10 Ml Flush Syringe  IV  





  PRN PRN  





  LINE FLUSH

## 2022-07-29 NOTE — PROGRESS NOTE
<CHACEVENESSA TURCIOSEmily - Last Filed: 07/29/22 14:00>





Assessment and Plan


Assessment and plan: 


This is a 42-year-old male with HTN, right-sided CVA with residual left-sided 

hemiplegia and bedridden's, HFrEF, MI s/p stent placement, DM, asthma, COPD, 

current nicotine abuse, A. fib and noncompliance with A. fib with RVR, acute 

kidney injury with hypoglycemia and severe hyperkalemia





Neuro: Acute metabolic encephalopathy, h/o right-sided CVA with residual left-

sided hemiplegia and bedridden


-Reorientation as needed


-Maintain sleep-wake cycle


-As needed analgesia


-Neurology consulted, appreciate recommendations


   -consulted for guidance re anticoagulation per cards request


-PT/OT consulted





Cardiac: A. fib, Acute on Chronic Systolic HF, h/o MI s/p stent placement,  

nonischemic cardiomyopathy, chronic A. fib


-Cardiology consulted, appreciate recommendations


-Blood pressure monitoring per protocol


-Echocardiogram shows LVEF of 20 to 25%, severe hypokinesis of septal wall and 

apex


-Digoxin discontinued


-Amio bolus and gtt along with PO, Addition of BB


-Per cards: Due to low EF did not recommend diltiazem at this time, No ACE/ARB 

due to renal function


-Admit proBNP 71313





Respiratory: h/o COPD


-Currently on room air


-CCM consulted, appreciate recommendations


-Supplemental oxygen as needed


-SPO2 monitoring


-Pulmonary hygiene





GI: Morbid obesity, mild protein calorie malnutrition


-24 hours -3815 mL


-PPI


-Renal cardiac CC diet with supplement


-BR: Colace





: Acute kidney injury, hyponatremia, hypochloremia


-Nephrology consulted, appreciate recommendations


-HD catheter placed 7/26   


-HD per nephrology 


-Record intake and output


-Renally dose medications


-Serum osmo 212


-Avoid nephrotoxic medications


-FeNa indicated intrarenal cause of renal failure 


-Renal ultrasound consistent with medical renal disease


-Trend BMP





ID: SIRS, r/o Sepsis, Dry gangrene?


-Vascular surgery consulted, appreciate recommendations


-Arterial dopplar shows occlusion of Right post tib and DP artery


-Tmax 101.5, tachycardia, leukocystosis, CARLEY


-UA pending


-BC x 2 pending


-cxr shows venous congestion


-WOCN consulted for right great toe


-Started on rocephin


-f/u blood culture


-Monitor WBC and temperature curve





Endo: s/p Hypoglycemia,  h/o DM


-Avoid hypoglycemia


-s/p D10 gtt


-Accu-Cheks q.6





Heme: Leukocytosis


-Trend CBC


-Transfuse hemoglobin less than 7


-SCDs to BLE while in bed











The high probability of a clinically significant, sudden or life threatening 

deterioration of the [multi] system(s) required my full and direct attention, 

intervention and personal management. The aggregate critical care time was [60] 

minutes. This time is in addition to time spent performing reported procedures 

but includes the following: 





[x] Data Review and interpretation 





[x] Patient assessment and monitoring of vital signs 





[x] Documentation 





[x] Medication orders and management





Disposition Plan: transfer to floor


Total Time Spent with Patient (Minutes): 60





History


Interval history: 


This is a 42-year-old male with HTN, right CVA (1/2021) with residual left-sided

 hemiplegia and bedridden, heart failure with reduced EF (EF 20 to 25% in 2013),

 MI in 2016, s/p stent placement in 2017, DM, COPD, current nicotine abuse, A. 

fib and noncompliance who presented to the emergency department on 7/25 via EMS 

for generalized weakness and low blood glucose.  Per EMS patient's blood glucose

 levels were in the 40s and he complained of inability to urinate for 2 days and

 shortness of breath.  Patient was admitted to [facility] in January 2021 

and is status BUN/creatinine on 5/27/2020 was noted to be 18/1.0.  Patient 

presented to emergency department with fever of 99, , /90, lab work 

showed leukocytosis, hyponatremia at 128, hyperkalemia at 7.2, metabolic 

acidosis of 17, elevated BUN/creatinine at 107/15.7 and hyperglycemia 54.  

Patient was medically treated with calcium gluconate and bicarbonate, received 2

 L normal saline bolus and it was noted that patient refused Zamarripa catheter 

placement.  Nephrology was consulted in the emergency department.  Patient was 

admitted to the hospitalist service with hypertensive emergency, hypoglycemia, 

hyperkalemia, acute kidney injury, A. fib RVR and CHF with consults to [facility] and 

cardiology.





Hospital course to date:





7/26: Vas-Cath placed for emergent hemodialysis for which she received.  RN 

asked to BladderScan the patient for possible placement of Zamarripa catheter.  

Renal ultrasound pending.  Sodium bicarbonate drip was discontinued due to 

bicarbonate improvement into the 20s and D10 was decreased to 75.  Digoxin 

discontinued.  Neurology consulted for guidance for anticoagulation as patient 

was initially not anticoagulated for risk of hemorrhagic conversion of CVA.





7/27: Cardio will start amio as documented allergy is not a true allergy. HD 

again today. DC hydral given and decrease amlodipine re soft BPs. Bladder scan 

revealed no urine. [facility] will like to continue D10 but decrease rate and will 

start heparin gtt given afib (was on home eliquis) given possible need for 

vascath. 





7/28: Cardiology started patient on p.o. amiodarone to be given along with 

amiodarone drip, hemodialysis today.  Patient started having urine output.  

Vascular surgery consulted for great toe.  Patient will be transferred to [facility].





7/29: No acute vents reported overnight, occlusion of right posterior tib and DP

 artery on vascular ultrasound.  Continue on amiodarone drip and p.o.  Patient 

started on metoprolol also.





Hospitalist Physical





- Constitutional


Vitals: 


                                        











Temp Pulse Resp BP Pulse Ox


 


 97.3 F L  125 H  19   132/78   98 


 


 07/29/22 04:00  07/29/22 12:30  07/29/22 06:00  07/29/22 12:30  07/29/22 06:00











General appearance: Present: no acute distress





- EENT


Eyes: Present: PERRL


ENT: hearing intact, clear oral mucosa





- Neck


Neck: Present: normal ROM





- Respiratory


Respiratory effort: normal


Respiratory: bilateral: CTA, diminished





- Cardiovascular


Rhythm: regular


Heart Sounds: Present: S1 & S2.  Absent: systolic murmur, diastolic murmur





- Extremities


Extremities: no ischemia, pulses intact, pulses symmetrical, normal temperature,

 normal color


Extremity abnormal: edema


Peripheral Pulses: within normal limits





- Abdominal


General gastrointestinal: soft, non-tender, normal bowel sounds





- Integumentary


Integumentary: Present: warm, dry





- Psychiatric


Psychiatric: appropriate mood/affect, cooperative





- Neurologic


Neurologic: CNII-XII intact, no focal deficits, moves all extremities





- Allied Health


Allied health notes reviewed: nursing, RT, social work





HEART Score





- HEART Score


Age: 45-65


Risk factors: > 3 risk factors or hx of atherosclerotic disease


Troponin: 1-3x normal limit





- Critical Actions


Critical Actions: 4-6 pts:12-16.6% risk of adverse cardiac event. Should be 

admitted





Results





- Labs


CBC & Chem 7: 


                                 07/29/22 05:50





                                 07/29/22 05:50


Labs: 


                             Laboratory Last Values











WBC  14.8 K/mm3 (4.5-11.0)  H  07/29/22  05:50    


 


RBC  4.16 M/mm3 (3.65-5.03)   07/29/22  05:50    


 


Hgb  12.4 gm/dl (11.8-15.2)   07/29/22  05:50    


 


Hct  37.6 % (35.5-45.6)   07/29/22  05:50    


 


MCV  90 fl (84-94)   07/29/22  05:50    


 


MCH  30 pg (28-32)   07/29/22  05:50    


 


MCHC  33 % (32-34)   07/29/22  05:50    


 


RDW  14.6 % (13.2-15.2)   07/29/22  05:50    


 


Plt Count  284 K/mm3 (140-440)   07/29/22  05:50    


 


Add Manual Diff  Complete   07/26/22  04:16    


 


Total Counted  100   07/26/22  04:16    


 


Seg Neuts % (Manual)  78.0 % (40.0-70.0)  H  07/26/22  04:16    


 


Band Neutrophils %  0 %  07/26/22  04:16    


 


Lymphocytes % (Manual)  11.0 % (13.4-35.0)  L  07/26/22  04:16    


 


Reactive Lymphs % (Man)  0 %  07/26/22  04:16    


 


Monocytes % (Manual)  11.0 % (0.0-7.3)  H  07/26/22  04:16    


 


Eosinophils % (Manual)  0 % (0.0-4.3)   07/26/22  04:16    


 


Basophils % (Manual)  0 % (0.0-1.8)   07/26/22  04:16    


 


Metamyelocytes %  0 %  07/26/22  04:16    


 


Myelocytes %  0 %  07/26/22  04:16    


 


Promyelocytes %  0 %  07/26/22  04:16    


 


Blast Cells %  0 %  07/26/22  04:16    


 


Nucleated RBC %  Not Reportable   07/26/22  04:16    


 


Seg Neutrophils # Man  12.5 K/mm3 (1.8-7.7)  H  07/26/22  04:16    


 


Band Neutrophils #  0.0 K/mm3  07/26/22  04:16    


 


Lymphocytes # (Manual)  1.8 K/mm3 (1.2-5.4)   07/26/22  04:16    


 


Abs React Lymphs (Man)  0.0 K/mm3  07/26/22  04:16    


 


Monocytes # (Manual)  1.8 K/mm3 (0.0-0.8)  H  07/26/22  04:16    


 


Eosinophils # (Manual)  0.0 K/mm3 (0.0-0.4)   07/26/22  04:16    


 


Basophils # (Manual)  0.0 K/mm3 (0.0-0.1)   07/26/22  04:16    


 


Metamyelocytes #  0.0 K/mm3  07/26/22  04:16    


 


Myelocytes #  0.0 K/mm3  07/26/22  04:16    


 


Promyelocytes #  0.0 K/mm3  07/26/22  04:16    


 


Blast Cells #  0.0 K/mm3  07/26/22  04:16    


 


WBC Morphology  Not Reportable   07/26/22  04:16    


 


Hypersegmented Neuts  Not Reportable   07/26/22  04:16    


 


Hyposegmented Neuts  Not Reportable   07/26/22  04:16    


 


Hypogranular Neuts  Not Reportable   07/26/22  04:16    


 


Smudge Cells  Not Reportable   07/26/22  04:16    


 


Toxic Granulation  Not Reportable   07/26/22  04:16    


 


Toxic Vacuolation  Not Reportable   07/26/22  04:16    


 


Dohle Bodies  Not Reportable   07/26/22  04:16    


 


Pelger-Huet Anomaly  Not Reportable   07/26/22  04:16    


 


Cb Rods  Not Reportable   07/26/22  04:16    


 


Platelet Estimate  Consistent w auto   07/26/22  04:16    


 


Clumped Platelets  Not Reportable   07/26/22  04:16    


 


Plt Clumps, EDTA  Not Reportable   07/26/22  04:16    


 


Large Platelets  Not Reportable   07/26/22  04:16    


 


Giant Platelets  Not Reportable   07/26/22  04:16    


 


Platelet Satelliting  Not Reportable   07/26/22  04:16    


 


Plt Morphology Comment  Not Reportable   07/26/22  04:16    


 


RBC Morphology  Not Reportable   07/26/22  04:16    


 


Dimorphic RBCs  Not Reportable   07/26/22  04:16    


 


Polychromasia  Not Reportable   07/26/22  04:16    


 


Hypochromasia  Not Reportable   07/26/22  04:16    


 


Poikilocytosis  Not Reportable   07/26/22  04:16    


 


Anisocytosis  Not Reportable   07/26/22  04:16    


 


Microcytosis  Not Reportable   07/26/22  04:16    


 


Macrocytosis  Not Reportable   07/26/22  04:16    


 


Spherocytes  Not Reportable   07/26/22  04:16    


 


Pappenheimer Bodies  Not Reportable   07/26/22  04:16    


 


Sickle Cells  Not Reportable   07/26/22  04:16    


 


Target Cells  Not Reportable   07/26/22  04:16    


 


Tear Drop Cells  Not Reportable   07/26/22  04:16    


 


Ovalocytes  Not Reportable   07/26/22  04:16    


 


Helmet Cells  Not Reportable   07/26/22  04:16    


 


[place] Bodies  Not Reportable   07/26/22  04:16    


 


Cabot Rings  Not Reportable   07/26/22  04:16    


 


[place] Cells  Not Reportable   07/26/22  04:16    


 


Bite Cells  Not Reportable   07/26/22  04:16    


 


Crenated Cell  Not Reportable   07/26/22  04:16    


 


Elliptocytes  Not Reportable   07/26/22  04:16    


 


Acanthocytes (Spur)  Not Reportable   07/26/22  04:16    


 


Rouleaux  Not Reportable   07/26/22  04:16    


 


Hemoglobin C Crystals  Not Reportable   07/26/22  04:16    


 


Schistocytes  Not Reportable   07/26/22  04:16    


 


Malaria parasites  Not Reportable   07/26/22  04:16    


 


Justin Bodies  Not Reportable   07/26/22  04:16    


 


Hem Pathologist Commnt  No   07/26/22  04:16    


 


PT  15.8 Sec. (12.2-14.9)  H  07/27/22  12:54    


 


INR  1.13  (0.87-1.13)   07/27/22  12:54    


 


APTT  33.5 Sec. (24.2-36.6)   07/27/22  12:54    


 


Heparin Anti-Xa Level  0.12 U.I./ml (0.3-0.7)  L  07/29/22  05:50    


 


Sodium  138 mmol/L (137-145)   07/29/22  05:50    


 


Potassium  3.9 mmol/L (3.6-5.0)  D 07/29/22  05:50    


 


Chloride  99.1 mmol/L ()   07/29/22  05:50    


 


Carbon Dioxide  30 mmol/L (22-30)   07/29/22  05:50    


 


Anion Gap  13 mmol/L  07/29/22  05:50    


 


BUN  31 mg/dL (9-20)  H  07/29/22  05:50    


 


Creatinine  4.0 mg/dL (0.8-1.3)  H D 07/29/22  05:50    


 


Estimated GFR  20 ml/min  07/29/22  05:50    


 


BUN/Creatinine Ratio  8 %  07/29/22  05:50    


 


Glucose  158 mg/dL ()  H  07/29/22  05:50    


 


POC Glucose  134 mg/dL ()  H  07/29/22  05:54    


 


Calcium  8.8 mg/dL (8.4-10.2)   07/29/22  05:50    


 


Phosphorus  3.60 mg/dL (2.5-4.5)   07/29/22  05:50    


 


Magnesium  1.80 mg/dL (1.7-2.3)   07/29/22  05:50    


 


Total Bilirubin  0.70 mg/dL (0.1-1.2)   07/27/22  04:00    


 


AST  65 units/L (5-40)  H  07/27/22  04:00    


 


ALT  36 units/L (7-56)   07/27/22  04:00    


 


Alkaline Phosphatase  129 units/L ()   07/27/22  04:00    


 


NT-Pro-B Natriuret Pep  19128 pg/mL (0-450)  H  07/26/22  04:16    


 


Total Protein  6.5 g/dL (6.3-8.2)   07/27/22  04:00    


 


Albumin  2.6 g/dL (3.9-5)  L  07/27/22  04:00    


 


Albumin/Globulin Ratio  0.7 %  07/27/22  04:00    


 


Urine Osmolality  212 Mosm/kg  07/28/22  11:46    


 


Urine Creatinine  62.6 mg/dL (0.1-20.0)  H  07/28/22  07:10    


 


Urine Sodium  26 mmol/L  07/28/22  07:10    


 


Urine Total Protein  15 mg/dL (5-11.8)  H  07/28/22  07:10    


 


Hepatitis A IgM Ab  Non-reactive  (NonReactive)   07/26/22  11:10    


 


Hep Bs Antigen  Non-reactive  (Negative)   07/26/22  11:10    


 


Hep B Core IgM Ab  Non-reactive  (NonReactive)   07/26/22  11:10    


 


Hepatitis C Antibody  Non-reactive  (NonReactive)   07/26/22  11:10    











Microbiology: 


Microbiology





07/26/22 18:00   Peripheral/Venous   Blood Culture - Preliminary


                            NO GROWTH AFTER 48 HOURS


07/26/22 18:00   Peripheral/Venous   Blood Culture - Preliminary


                            NO GROWTH AFTER 48 HOURS








Zamarripa/IV: 


                                        





Voiding Method                   Urinal











Active Medications





- Current Medications


Current Medications: 














Generic Name Dose Route Start Last Admin





  Trade Name Freq  PRN Reason Stop Dose Admin


 


Acetaminophen  650 mg  07/25/22 16:54  07/27/22 09:52





  Acetaminophen 325 Mg Tab  PO   650 mg





  Q4H PRN   Administration





  Pain MILD(1-3)/Fever >100.5/HA  


 


Amiodarone HCl  200 mg  07/28/22 11:00  07/29/22 10:58





  Amiodarone 200 Mg Tab  PO   200 mg





  BID LYNDA   Administration


 


Aspirin  325 mg  07/26/22 10:00  07/29/22 10:58





  Aspirin 325 Mg Tab  PO   325 mg





  QDAY LYNDA   Administration


 


Dextrose  50 ml  07/26/22 10:45 





  Dextrose 50% In Water (25gm) 50 Ml Syringe  IV  





  Q30MIN PRN  





  Hypoglycemia  





  Protocol  


 


Glucagon  1 mg  07/25/22 15:39  07/25/22 16:08





  Glucagon (Human Recombinant) 1 Mg/Ml Inj  IV   1 mg





  Q1H PRN   Administration





  Hypoglycemia  


 


Hydralazine HCl  10 mg  07/25/22 18:41 





  Hydralazine 20 Mg/1 Ml Inj  IV  





  Q2H PRN  





  Blood Pressure  


 


Sodium Chloride  100 mls @ 999 mls/hr  07/26/22 07:08 





  Nacl 0.9%  IV  





  CASSIDY PRN  





  Hypotension  


 


Amiodarone HCl 900 mg/  500 mls @ 33.333 mls/hr  07/27/22 11:15  07/28/22 10:00





  Dextrose  IV   0.5 mg/min





  AS DIRECT LYNDA   16.667 mls/hr





    Administration





  Protocol  





  1 MG/MIN  


 


Heparin Sodium/Sodium Chloride  25,000 unit in 500 mls @ 30 mls/hr  07/27/22 

12:00  07/29/22 07:36





  Heparin/ 0.45% Nacl-25,000 Unit/500 Ml  IV   1,500 units/hr





  TITR LYNDA   30 mls/hr





    Titration





  Protocol  





  1,500 UNITS/HR  


 


Ceftriaxone Sodium  2 gm in 100 mls @ 200 mls/hr  07/27/22 12:00  07/29/22 13:21





  Rocephin/Ns 2 Gm/100 Ml  IV  08/01/22 11:59  Not Given





  Q24H LYNDA  





  Protocol  


 


Insulin Human Regular  0 units  07/28/22 13:00  07/29/22 13:21





  Insulin Regular, Human 100 Units/1 Ml  SUB-Q   Not Given





  Q6H [facility]  





  Protocol  


 


Metoprolol Tartrate  25 mg  07/29/22 11:30  07/29/22 12:30





  Metoprolol Tartrate 25 Mg Tab  PO   25 mg





  BID LYNDA   Administration


 


Morphine Sulfate  2 mg  07/25/22 16:54  07/27/22 13:48





  Morphine 2 Mg/1 Ml Inj  IV   2 mg





  Q4H PRN   Administration





  Pain, Moderate (4-6)  


 


Ondansetron HCl  4 mg  07/27/22 12:00  07/27/22 13:48





  Ondansetron 4 Mg/2 Ml Inj  IV   4 mg





  Q6H PRN   Administration





  Nausea And Vomiting  


 


Oxycodone/Acetaminophen  1 tab  07/25/22 16:54  07/29/22 10:58





  Oxycodone /Acetaminophen 5-325mg Tab  PO   1 tab





  Q6H PRN   Administration





  Pain, Moderate (4-6)  


 


Sodium Chloride  10 ml  07/25/22 22:00  07/29/22 11:00





  Sodium Chloride 0.9% 10 Ml Flush Syringe  IV   10 ml





  BID LYNDA   Administration


 


Sodium Chloride  10 ml  07/25/22 16:54 





  Sodium Chloride 0.9% 10 Ml Flush Syringe  IV  





  PRN PRN  





  LINE FLUSH  














<RIMMA PEARSON - Last Filed: 08/09/22 07:33>





History


Interval history: 





I saw and evaluated the patient. Discussed with the nurse practitioner and agree

 with their findings and plan as documented in this note.





Hospitalist Physical





- Constitutional


Vitals: 


                                        











Temp Pulse Resp BP Pulse Ox


 


 98.0 F   70   18   130/99   97 


 


 08/03/22 07:37  08/03/22 11:16  08/03/22 07:37  08/03/22 07:37  08/03/22 12:00














Results





- Labs


CBC & Chem 7: 


                                 08/02/22 11:33





                                 08/03/22 05:21


Labs: 


                             Laboratory Last Values











WBC  15.4 K/mm3 (4.5-11.0)  H  08/02/22  11:33    


 


RBC  4.40 M/mm3 (3.65-5.03)   08/02/22  11:33    


 


Hgb  13.0 gm/dl (11.8-15.2)   08/02/22  11:33    


 


Hct  39.8 % (35.5-45.6)   08/02/22  11:33    


 


MCV  90 fl (84-94)   08/02/22  11:33    


 


MCH  30 pg (28-32)   08/02/22  11:33    


 


MCHC  33 % (32-34)   08/02/22  11:33    


 


RDW  14.7 % (13.2-15.2)   08/02/22  11:33    


 


Plt Count  369 K/mm3 (140-440)   08/02/22  11:33    


 


Lymph % (Auto)  15.2 % (13.4-35.0)   08/02/22  11:33    


 


Mono % (Auto)  7.3 % (0.0-7.3)   08/02/22  11:33    


 


Eos % (Auto)  1.7 % (0.0-4.3)   08/02/22  11:33    


 


Baso % (Auto)  1.6 % (0.0-1.8)   08/02/22  11:33    


 


Lymph # (Auto)  2.4 K/mm3 (1.2-5.4)   08/02/22  11:33    


 


Mono # (Auto)  1.1 K/mm3 (0.0-0.8)  H  08/02/22  11:33    


 


Eos # (Auto)  0.3 K/mm3 (0.0-0.4)   08/02/22  11:33    


 


Baso # (Auto)  0.2 K/mm3 (0.0-0.1)  H  08/02/22  11:33    


 


Add Manual Diff  Complete   08/01/22  03:56    


 


Total Counted  100   08/01/22  03:56    


 


Seg Neutrophils %  74.2 % (40.0-70.0)  H  08/02/22  11:33    


 


Seg Neuts % (Manual)  81.0 % (40.0-70.0)  H  08/01/22  03:56    


 


Band Neutrophils %  0 %  08/01/22  03:56    


 


Lymphocytes % (Manual)  11.0 % (13.4-35.0)  L  08/01/22  03:56    


 


Reactive Lymphs % (Man)  0 %  08/01/22  03:56    


 


Monocytes % (Manual)  5.0 % (0.0-7.3)   08/01/22  03:56    


 


Eosinophils % (Manual)  3.0 % (0.0-4.3)   08/01/22  03:56    


 


Basophils % (Manual)  0 % (0.0-1.8)   08/01/22  03:56    


 


Metamyelocytes %  0 %  08/01/22  03:56    


 


Myelocytes %  0 %  08/01/22  03:56    


 


Promyelocytes %  0 %  08/01/22  03:56    


 


Blast Cells %  0 %  08/01/22  03:56    


 


Nucleated RBC %  Not Reportable   08/01/22  03:56    


 


Seg Neutrophils #  11.5 K/mm3 (1.8-7.7)  H  08/02/22  11:33    


 


Seg Neutrophils # Man  11.5 K/mm3 (1.8-7.7)  H  08/01/22  03:56    


 


Band Neutrophils #  0.0 K/mm3  08/01/22  03:56    


 


Lymphocytes # (Manual)  1.6 K/mm3 (1.2-5.4)   08/01/22  03:56    


 


Abs React Lymphs (Man)  0.0 K/mm3  08/01/22  03:56    


 


Monocytes # (Manual)  0.7 K/mm3 (0.0-0.8)   08/01/22  03:56    


 


Eosinophils # (Manual)  0.4 K/mm3 (0.0-0.4)   08/01/22  03:56    


 


Basophils # (Manual)  0.0 K/mm3 (0.0-0.1)   08/01/22  03:56    


 


Metamyelocytes #  0.0 K/mm3  08/01/22  03:56    


 


Myelocytes #  0.0 K/mm3  08/01/22  03:56    


 


Promyelocytes #  0.0 K/mm3  08/01/22  03:56    


 


Blast Cells #  0.0 K/mm3  08/01/22  03:56    


 


WBC Morphology  Not Reportable   08/01/22  03:56    


 


Hypersegmented Neuts  Not Reportable   08/01/22  03:56    


 


Hyposegmented Neuts  Not Reportable   08/01/22  03:56    


 


Hypogranular Neuts  Not Reportable   08/01/22  03:56    


 


Smudge Cells  Not Reportable   08/01/22  03:56    


 


Toxic Granulation  Not Reportable   08/01/22  03:56    


 


Toxic Vacuolation  Not Reportable   08/01/22  03:56    


 


Dohle Bodies  Not Reportable   08/01/22  03:56    


 


Pelger-Huet Anomaly  Not Reportable   08/01/22  03:56    


 


Cb Rods  Not Reportable   08/01/22  03:56    


 


Platelet Estimate  Consistent w auto   08/01/22  03:56    


 


Clumped Platelets  Not Reportable   08/01/22  03:56    


 


Plt Clumps, EDTA  Not Reportable   08/01/22  03:56    


 


Large Platelets  Not Reportable   08/01/22  03:56    


 


Giant Platelets  Not Reportable   08/01/22  03:56    


 


Platelet Satelliting  Not Reportable   08/01/22  03:56    


 


Plt Morphology Comment  Not Reportable   08/01/22  03:56    


 


RBC Morphology  Not Reportable   08/01/22  03:56    


 


Dimorphic RBCs  Not Reportable   08/01/22  03:56    


 


Polychromasia  Not Reportable   08/01/22  03:56    


 


Hypochromasia  Not Reportable   08/01/22  03:56    


 


Poikilocytosis  Not Reportable   08/01/22  03:56    


 


Anisocytosis  1+   08/01/22  03:56    


 


Microcytosis  Not Reportable   08/01/22  03:56    


 


Macrocytosis  Not Reportable   08/01/22  03:56    


 


Spherocytes  Not Reportable   08/01/22  03:56    


 


Pappenheimer Bodies  Not Reportable   08/01/22  03:56    


 


Sickle Cells  Not Reportable   08/01/22  03:56    


 


Target Cells  Not Reportable   08/01/22  03:56    


 


Tear Drop Cells  Not Reportable   08/01/22  03:56    


 


Ovalocytes  Not Reportable   08/01/22  03:56    


 


Helmet Cells  Not Reportable   08/01/22  03:56    


 


Ge-Yucca Bodies  Not Reportable   08/01/22  03:56    


 


Cabot Rings  Not Reportable   08/01/22  03:56    


 


Ariel Cells  Not Reportable   08/01/22  03:56    


 


Bite Cells  Not Reportable   08/01/22  03:56    


 


Crenated Cell  Not Reportable   08/01/22  03:56    


 


Elliptocytes  Not Reportable   08/01/22  03:56    


 


Acanthocytes (Spur)  Not Reportable   08/01/22  03:56    


 


Rouleaux  Not Reportable   08/01/22  03:56    


 


Hemoglobin C Crystals  Not Reportable   08/01/22  03:56    


 


Schistocytes  Not Reportable   08/01/22  03:56    


 


Malaria parasites  Not Reportable   08/01/22  03:56    


 


Justin Bodies  Not Reportable   08/01/22  03:56    


 


Hem Pathologist Commnt  No   08/01/22  03:56    


 


PT  13.0 Sec. (12.2-14.9)   08/02/22  11:33    


 


INR  0.87  (0.87-1.13)   08/02/22  11:33    


 


APTT  51.9 Sec. (24.2-36.6)  H  08/02/22  11:33    


 


Heparin Anti-Xa Level  < 0.10 U.I./ml (0.3-0.7)  L  08/03/22  05:21    


 


Sodium  142 mmol/L (137-145)   08/03/22  05:21    


 


Potassium  4.0 mmol/L (3.6-5.0)   08/03/22  05:21    


 


Chloride  105.2 mmol/L ()   08/03/22  05:21    


 


Carbon Dioxide  23 mmol/L (22-30)   08/03/22  05:21    


 


Anion Gap  18 mmol/L  08/03/22  05:21    


 


BUN  16 mg/dL (9-20)   08/03/22  05:21    


 


Creatinine  1.3 mg/dL (0.8-1.3)   08/03/22  05:21    


 


Estimated GFR  > 60 ml/min  08/03/22  05:21    


 


BUN/Creatinine Ratio  12 %  08/03/22  05:21    


 


Glucose  124 mg/dL ()  H  08/03/22  05:21    


 


POC Glucose  106 mg/dL ()  H  08/03/22  12:11    


 


Calcium  8.8 mg/dL (8.4-10.2)   08/03/22  05:21    


 


Phosphorus  3.60 mg/dL (2.5-4.5)   07/29/22  05:50    


 


Magnesium  1.80 mg/dL (1.7-2.3)   07/29/22  05:50    


 


Total Bilirubin  0.70 mg/dL (0.1-1.2)   07/27/22  04:00    


 


AST  65 units/L (5-40)  H  07/27/22  04:00    


 


ALT  36 units/L (7-56)   07/27/22  04:00    


 


Alkaline Phosphatase  129 units/L ()   07/27/22  04:00    


 


NT-Pro-B Natriuret Pep  73638 pg/mL (0-450)  H  07/26/22  04:16    


 


Total Protein  6.5 g/dL (6.3-8.2)   07/27/22  04:00    


 


Albumin  2.6 g/dL (3.9-5)  L  07/27/22  04:00    


 


Albumin/Globulin Ratio  0.7 %  07/27/22  04:00    


 


Urine Osmolality  212 Mosm/kg  07/28/22  11:46    


 


Urine Creatinine  62.6 mg/dL (0.1-20.0)  H  07/28/22  07:10    


 


Urine Sodium  26 mmol/L  07/28/22  07:10    


 


Urine Total Protein  15 mg/dL (5-11.8)  H  07/28/22  07:10    


 


Coronavirus (PCR)  Negative  (Negative)   07/30/22  11:58    


 


Hepatitis A IgM Ab  Non-reactive  (NonReactive)   07/26/22  11:10    


 


Hep Bs Antigen  Non-reactive  (Negative)   07/26/22  11:10    


 


Hep B Core IgM Ab  Non-reactive  (NonReactive)   07/26/22  11:10    


 


Hepatitis C Antibody  Non-reactive  (NonReactive)   07/26/22  11:10    











Zamarripa/IV: 


                                        





Voiding Method                   Urinal











Nutrition/Malnutrition Assess





- Dietary Evaluation


Nutrition/Malnutrition Findings: 


                                        





Nutrition Notes                                            Start:  08/01/22 

14:11


Freq:                                                      Status: Discharge    

 


Protocol:                                                                       

 


 Document     08/01/22 14:11  MOODY  (Rec: 08/01/22 14:51  MOODY  [id number])


 Nutrition Notes


     Need for Assessment generated from:         LOS


     Initial or Follow up                        Assessment


     Current Diagnosis                           Acute Kidney Injury,COPD,


                                                 Malnutrition,Stroke


     Other Pertinent Diagnosis                   PAD, R-Great Toe Gangrene,


                                                 Cardiomyopathy, Leukocytosis,


                                                 Atrial Fibrilation


     Current Diet                                Renal Diet (since D 07/25).


     Labs/Tests                                  08/01: Crea 1.5, Glu 131.


     Pertinent Medications                       08/01: Nutritionally


                                                 unremarkable.


     Height                                      6 ft


     Weight                                      138 kg


     Ideal Body Weight (kg)                      80.90


     BMI                                         41.2


     Intake Prior to Admission                   Good


     Weight change and time frame                Pt denies having loss body


                                                 weight PTA.


     Weight Status                               Morbidly Obese


     Subjective/Other Information                RD consult for LOS assessment.


                                                 No reports available on Pt's


                                                 PO intake of meals, but MD


                                                 states that dies has been well


                                                 tolerated at the time,


                                                 according to Progress notes.


                                                 Pt is on Room Air, O2


                                                 saturation @ 07%, according to


                                                 Physical Assessment History


                                                 notes.


                                                 Pt has missing teeth,


                                                 according to Physical


                                                 Assessment History notes.


                                                 Pt presents R-Toe gangrene and


                                                 a sacral area of concern for


                                                 skin risk at the time,


                                                 according to Physical


                                                 Assessment History notes.


                                                 I will prescribe dietary


                                                 supplements to support wound


                                                 healing processes during LOS.


                                                 Pt presents bilateral-LE


                                                 Pitting Edema 1+, according to


                                                 Physical Assessment History


                                                 notes.


                                                 Pt is bedridden, according to


                                                 Physical Assessment History


                                                 notes.


                                                 Pt will undergo for possible


                                                 revascularization on 08/03,


                                                 according to Progress notes.


     Percent of energy/protein needs met:        Prescribed Renal Diet provides


                                                 for energy/protein needs (2,


                                                 072 Kcal/77 g) during LOS;


                                                 additionally, Dietary


                                                 Supplements will support wound


                                                 healing processes with 190


                                                 Kcal and 5 g of protein.


     Burn                                        Absent


     Trauma                                      Absent


     GI Symptoms                                 None


     Food Allergy                                No


     Skin Integrity/Comment                      R-Toe gangrene & sacral area


                                                 of con


     Current % PO                                Good (%)


     Minimum of two criteria                     No


     Fluid Accumulation                          Mild (non-severe)


     Reduced  Strength                       N/A (non-severe)


     Protein-Calorie Malnutrition                N\A


     #1


      Nutrition Diagnosis                        Increased nutrient needs   (


                                                 specify in comment below)


      Comments:                                  Protein to support wound


                                                 healing processes.


      Etiology                                   Pt being bedridden, negligence


                                                 .


      As Evidenced by Signs and Symptoms         Pt presents R-Toe gangrene and


                                                 a sacral area of concern for


                                                 skin risk at the time,


                                                 according to Physical


                                                 Assessment History notes.


     Is patient on ventilator?                   No


     Is Patient Ambulatory and/or Out of Bed     No


     REE-([facility]-confined to bed)      2795.784


     Kcal/Kg value to use for calculation        16


     Approximate Energy Requirements Using       2208


      kcal/Kg                                    


     Calculation Used for Recommendations        Kcal/kg


     Additional Notes                            Protein: 1.25-1.5 g/Kg AdjBW;


                                                 138-165 g/day.


                                                 Fluids: 1 ml/Kcal, or as per


                                                 MD.


 Nutrition Intervention


     Change Diet Order:                          Continue Renal Diet.


     Add Supplement/Snack (indicate name/kcal    Start 28.8 g pkt Tru; BID.


      /protein )                                 


     Provides kCal:                              190


     Provides Protein (gm)                       5


     Goal #1                                     Support, through dietary


                                                 supplementation, wound healing


                                                 processes during LOS.


     Goal #2                                     Adjust the dietary


                                                 intervention to better serve


                                                 Pt's needs and clinical


                                                 conditions during LOS.


     Follow-Up By:                               08/08/22


     Additional Comments                         Continue monitoring food


                                                 tolerance, %PO intake of meals


                                                 , dietary supplements, and BM.

## 2022-07-29 NOTE — PROGRESS NOTE
2020       Chirag Danielson MD  [address] [facility] [address]  VIA Facsimile: [phone]      Patient: Robyn Donnelly   YOB: 1945   Date of Visit: 2020       Dear Dr. Danielson:    Thank you for referring Robyn Donnelly to me for evaluation. Below are my notes for this visit with her.    If you have questions, please do not hesitate to call me. I look forward to following your patient along with you.      Sincerely,        Guero Gutierrez MD        CC: No Recipients  Guero Gutierrez MD  2020 10:41 AM  Sign when Signing Visit    [facility]    2020    [address] [place] [id number]      Re: Robyn Donnelly   : 1945     MRN:  [id number]    Reason for Visit:   Chief Complaint   Patient presents with   • Hypertension         Dear Dr. Chirag Danielson MD    I had the pleasure of seeing your patient, Robyn Donnelly in my cardiovascular office today. Ms. Donnelly is a 74 year old female with the following cardiac and non cardiac pertinent problems.    Problem List:  Patient Active Problem List   Diagnosis   • Abnormal ECG   • Essential hypertension   • Preoperative evaluation of a medical condition to rule out surgical contraindications (TAR required)   • Diabetes mellitus type 2 in obese (CMS/HCC)   • Diabetes (CMS/HCC)   • Hyperlipidemia   • Precordial pain       History:  This is a follow-up visit for Mrs. Donnelly.  She is a 73 yo diabetic female with HLP and GIO.  Since I last saw her she went through knee replacement surgery.  Her echo showed preserved LV function with mild PHTN.  Her rehab was complicated by bleeding from the knee and an infection for which she was on 6 weeks of antibiotics.  Now doing better.    In the office today she complains of occasional chest pain, which is new.  Occurs usually at night for 5-10 min.  Associated SOB.  She had SOB with shoveling snow as well.  Denies orthopnea, PND, near-syncope or syncope.    In march TC  Assessment and Plan





43 y/o male with hyperkalemia, secondary to acute renal failure of unknown 

etiology with hypoglycemia likely secondary to oral medications not fully being 

cleared secondary to renal impairment.





7/29/22:  Pulm status is stable.  Will sign off.  Call if questions.





7/28/22:  Off D10 now, tolerating PO.  From a critical care standpoint, stable 

for transfer.  Continue Amio and follow up cards recs.  Vascular consulted given

toe appearance and concern for infection.  Continue IV abx therapy and follow up

cultures.  Will likely sign off once out of unit.





7/27/22:  Bladder scan showed no urine in bladder so will hold on joyce 

placement.  HD per renal for 3 days straight so again tomorrow.  Patient still 

on D10 at 75 and highest Blood sugar has been 195.  Will continue for now but 

will drop down to 50.  Has a diet.  Likely oral hypoglycemics are still in 

system.  If not improved after HD session tomorrow, may need to ask renal about 

further sessions to help with removal of active metabolites.  Given that no real

allergy to amio, cards will initiate today.  Weighed the risk and benefits and 

will start anticoagulation but with heparin as patient will likely need long 

term dialysis catheter placement.  Continue ICU care.  Prognosis still remains 

guarded.





1.  Renal-nephro already ordered HD and patient is on it.  Unsure if bladder 

scan was done but patient has no Joyce as he refused it in the ED.  Still would 

consider bladder scan and will discuss placement of joyce based on scan results.

 HD per renal.  Likely needs renal ultrasound as well.





2.  Endocrine-persistent hypoglycemia requiring D10 drip.  Once patient has 3 

consecutive sugars greater than 180 then would be ok with stopping D10.  Hopeful

HD will help to remove active metabolites of drugs that maybe adding to 

continued hypoglycemia.  If not improvement post HD, will need further work up. 

Ok with feeding patient as long as he remains alert and can protect his airway. 

Continue q1 hour finger sticks





3.  Cardiovascular-tachycardic.  Questionable afib.  Awaiting 12 lead EKG.  Hold

on therapy for right now.  Cards is following as well.  Stopped Dig given renal 

function.





4.  GI- ok with feeding patient, needs prophylaxis for ulcers.





5.  Neuro-some confusion on questioning but for the most part stable, will 

continue to monitor





6.  Guarded prognosis.





CCt 31 minutes.











Subjective


Date of service: 07/29/22


Principal diagnosis: CARLEY 


Interval history: 





Transferred to [facility] on yesterday.  Vascular evaluated on yesterday as well.





Objective





- Constitutional


Vitals: 


                               Vital Signs - 12hr











  07/29/22 07/29/22 07/29/22





  02:01 03:01 04:00


 


Temperature   97.3 F L


 


Pulse Rate 111 H 118 H 107 H


 


Pulse Rate [   110 H





From Monitor]   


 


Respiratory 16 14 13





Rate   


 


Blood Pressure 125/85 121/80 128/76


 


O2 Sat by Pulse 93 93 93





Oximetry   














  07/29/22 07/29/22





  05:00 06:00


 


Temperature  


 


Pulse Rate 108 H 96 H


 


Pulse Rate [  





From Monitor]  


 


Respiratory 14 19





Rate  


 


Blood Pressure 112/82 126/87


 


O2 Sat by Pulse 95 98





Oximetry  














- Labs


CBC & Chem 7: 


                                 07/29/22 05:50





                                 07/29/22 05:50


Labs: 


                              Abnormal lab results











  07/28/22 07/28/22 07/29/22 Range/Units





  18:14 23:48 05:50 


 


WBC    14.8 H  (4.5-11.0)  K/mm3


 


Heparin Anti-Xa Level     (0.3-0.7)  U.I./ml


 


BUN     (9-20)  mg/dL


 


Creatinine     (0.8-1.3)  mg/dL


 


Glucose     ()  mg/dL


 


POC Glucose  246 H  197 H   ()  mg/dL














  07/29/22 07/29/22 07/29/22 Range/Units





  05:50 05:50 05:54 


 


WBC     (4.5-11.0)  K/mm3


 


Heparin Anti-Xa Level  0.12 L    (0.3-0.7)  U.I./ml


 


BUN   31 H   (9-20)  mg/dL


 


Creatinine   4.0 H D   (0.8-1.3)  mg/dL


 


Glucose   158 H   ()  mg/dL


 


POC Glucose    134 H  ()  mg/dL














Medications & Allergies





- Medications


Allergies/Adverse Reactions: 


                                    Allergies





adhesive Allergy (Verified 07/25/22 14:07)


   Rash


ibuprofen [From Motrin] Allergy (Verified 07/25/22 14:07)


   Swelling


lisinopril Allergy (Verified 07/25/22 14:07)


   COUGH


metoprolol Allergy (Verified 07/25/22 14:07)


   Nausea


amiodarone Adverse Reaction (Verified 07/25/22 14:07)


   Nausea


Iodinated Contrast Media [Iodinated Contrast Media - IV Dye] Adverse Reaction 

(Verified 07/25/22 14:07)


   KIDNEYS SHUT DOWN








Home Medications: 


                                Home Medications











 Medication  Instructions  Recorded  Confirmed  Last Taken  Type


 


Digoxin [Lanoxin] 0.25 mg PO DAILY@1700 #30 tablet 01/05/15 06/27/17 06/26/17 Rx


 


Aspirin 325 mg PO QDAY 06/27/17 06/27/17 06/26/17 History


 


Furosemide [Lasix TAB] 40 mg PO QDAY 06/27/17 06/27/17 06/26/17 History


 


Losartan [Cozaar] 50 mg PO QDAY 06/27/17 06/27/17 06/26/17 History


 


HYDROcodone/APAP 5-325 [[place] 1 each PO Q6HR PRN #20 tablet 02/27/20  Unknown Rx





5/325]     


 


Penicillin Vk [Veetids TAB] 500 mg PO QID #21 tablet 02/27/20  Unknown Rx


 


Acetaminophen [Acetaminophen TAB] 1,000 mg PO Q6HR #30 tablet 05/27/20  Unknown 

Rx


 


cephALEXin [Keflex] 500 mg PO Q8HR #30 cap 05/27/20  Unknown Rx











Active Medications: 














Generic Name Dose Route Start Last Admin





  Trade Name Freq  PRN Reason Stop Dose Admin


 


Acetaminophen  650 mg  07/25/22 16:54  07/27/22 09:52





  Acetaminophen 325 Mg Tab  PO   650 mg





  Q4H PRN   Administration





  Pain MILD(1-3)/Fever >100.5/HA  


 


Amiodarone HCl  200 mg  07/28/22 11:00  07/29/22 10:58





  Amiodarone 200 Mg Tab  PO   200 mg





  BID LYNDA   Administration


 


Aspirin  325 mg  07/26/22 10:00  07/29/22 10:58





  Aspirin 325 Mg Tab  PO   325 mg





  QDAY LYNDA   Administration


 


Dextrose  50 ml  07/26/22 10:45 





  Dextrose 50% In Water (25gm) 50 Ml Syringe  IV  





  Q30MIN PRN  





  Hypoglycemia  





  Protocol  


 


Glucagon  1 mg  07/25/22 15:39  07/25/22 16:08





  Glucagon (Human Recombinant) 1 Mg/Ml Inj  IV   1 mg





  Q1H PRN   Administration





  Hypoglycemia  


 


Hydralazine HCl  10 mg  07/25/22 18:41 





  Hydralazine 20 Mg/1 Ml Inj  IV  





  Q2H PRN  





  Blood Pressure  


 


Sodium Chloride  100 mls @ 999 mls/hr  07/26/22 07:08 





  Nacl 0.9%  IV  





  CASSIDY PRN  





  Hypotension  


 


Amiodarone HCl 900 mg/  500 mls @ 33.333 mls/hr  07/27/22 11:15  07/28/22 10:00





  Dextrose  IV   0.5 mg/min





  AS DIRECT LYNDA   16.667 mls/hr





    Administration





  Protocol  





  1 MG/MIN  


 


Heparin Sodium/Sodium Chloride  25,000 unit in 500 mls @ 30 mls/hr  07/27/22 12

:00  07/29/22 07:36





  Heparin/ 0.45% Nacl-25,000 Unit/500 Ml  IV   1,500 units/hr





  TITR LYNDA   30 mls/hr





    Titration





  Protocol  





  1,500 UNITS/HR  


 


Ceftriaxone Sodium  2 gm in 100 mls @ 200 mls/hr  07/27/22 12:00  07/28/22 13:54





  Rocephin/Ns 2 Gm/100 Ml  IV  08/01/22 11:59  200 mls/hr





  Q24H LYNDA   Administration





  Protocol  


 


Insulin Human Regular  0 units  07/28/22 13:00  07/29/22 06:14





  Insulin Regular, Human 100 Units/1 Ml  SUB-Q   Not Given





  Q6H [facility]  





  Protocol  


 


Metoprolol Tartrate  25 mg  07/29/22 11:30 





  Metoprolol Tartrate 25 Mg Tab  PO  





  BID [facility]  


 


Morphine Sulfate  2 mg  07/25/22 16:54  07/27/22 13:48





  Morphine 2 Mg/1 Ml Inj  IV   2 mg





  Q4H PRN   Administration





  Pain, Moderate (4-6)  


 


Ondansetron HCl  4 mg  07/27/22 12:00  07/27/22 13:48





  Ondansetron 4 Mg/2 Ml Inj  IV   4 mg





  Q6H PRN   Administration





  Nausea And Vomiting  


 


Oxycodone/Acetaminophen  1 tab  07/25/22 16:54  07/29/22 10:58





  Oxycodone /Acetaminophen 5-325mg Tab  PO   1 tab





  Q6H PRN   Administration





  Pain, Moderate (4-6)  


 


Sodium Chloride  10 ml  07/25/22 22:00  07/29/22 11:00





  Sodium Chloride 0.9% 10 Ml Flush Syringe  IV   10 ml





  BID LYNDA   Administration


 


Sodium Chloride  10 ml  07/25/22 16:54 





  Sodium Chloride 0.9% 10 Ml Flush Syringe  IV  





  PRN PRN  





  LINE FLUSH  














HEART Score





- HEART Score


Age: 45-65


Risk factors: > 3 risk factors or hx of atherosclerotic disease


Troponin: 1-3x normal limit





- Critical Actions


Critical Actions: 4-6 pts:12-16.6% risk of adverse cardiac event. Should be 

admitted 190, HDL 69, trigs 118 and LDL 97.  HgA1c 6.1    Past History:  Past Medical History:   Diagnosis Date   • Anxiety and depression    • Diabetes (CMS/HCC)    • Hyperlipidemia    • Hypertension    • GIO (obstructive sleep apnea)     with CPAP   • Restless leg syndrome        Family History:  Family History   Problem Relation Age of Onset   • Coronary Artery Disease Father    • Myocardial Infarction Father    • Coronary Artery Disease Maternal Grandfather    • Myocardial Infarction Maternal Grandfather    • Coronary Artery Disease Paternal Grandmother         Social History:    reports that she quit smoking about 20 years ago. Her smoking use included cigarettes. She has a 39.00 pack-year smoking history. She has never used smokeless tobacco. She reports current alcohol use. She reports that she does not use drugs.    Review of Systems   Cardiovascular: Positive for chest pain. Negative for palpitations.   Respiratory: Negative for shortness of breath.    Neurological: Negative for light-headedness.     Constitutional: No weight loss.   Cardiovascular: + chest pain and SOB; no palpitations, orthopnea, PND, edema.  Respiratory: No cough.  Gastrointestinal:  No abdominal pain.  Genitourinary: No hematuria.  Musculoskeletal: No myalgia.  Skin: No rashes.  Neuro: No dizziness or syncope.  Eyes: No double vision.  ENT: No hearing loss.  Psychiatric: No depression.      Physical Exam:  Vitals:   Visit Vitals  /66   Pulse 70   Ht 5' 2.5\" (1.588 m)   Wt 120.2 kg (265 lb)   BMI 47.70 kg/m²     General:  No acute distress.  Cardiac:   JVP normal.   No carotid bruit.   Normal S1 & S2. No added sounds or murmurs. PMI normal.   No edema  Normal pedal pulses  Resp: Chest clear to auscultation.     GI: Abdomen soft, non tender.   Musculoskeletal: Gait normal. No tendon xanthoma.   Skin: No cyanosis.  Neuro: Speech is fluent.    Eyes: No jaundice or corneal arcus.   Psychiatric: Normal affect.    Impression:    1. Diabetes  mellitus type 2 in obese (CMS/HCC)    2. Essential hypertension    3. Mixed hyperlipidemia      Plan:  73 yo diabetic with CV risk factors including HTN, HLP and GIO now present with atypical CP.  Needs risk stratification with ischemic evaluation.  She is agreeable to PRECISE study - randomization to be done after blood work later today.  Usual care arm will be pharmacologic nuclear stress test.  Her LDL is not at target, will switch her to atorvastatin 80 mg daily.  Repeat lipid panel and CMP in 3 months.    I have asked the patient to return in 45 days. As always it is a pleasure to assist in the care of your patient. Please let me know if I can be of further assistance.    With warmest regards,        Guero Gutierrez MD  01/29/20    Current Outpatient Medications   Medication Sig Dispense Refill   • fluconazole (DIFLUCAN) 150 MG tablet TAKE 1 TABLET (150 MG TOTAL) BY MOUTH ONCE FOR 1 DOSE. MAY REPEAT X 1 IF SYMPTOMS STILL PRESENT  0   • traZODone (DESYREL) 50 MG tablet   0   • halobetasol (ULTRAVATE) 0.05 % cream APPLY TO AFFECTED AREA TWICE A DAY FOR UP TO 2WKS AS NEEDED FOR RASH  0   • lisinopril (ZESTRIL) 2.5 MG tablet Take 1 tablet by mouth daily. 90 tablet 3   • albuterol 108 (90 Base) MCG/ACT inhaler Inhale 2 puffs into the lungs.     • aspirin 81 MG tablet Take 81 mg by mouth.     • buPROPion (WELLBUTRIN XL) 150 MG 24 hr tablet Take 150 mg by mouth.     • Calcium 500 MG Chew Tab Chew 500 mg by mouth.     • Cholecalciferol (VITAMIN D3) 2000 units capsule Take 2,000 Units by mouth.     • clonazePAM (KLONOPIN) 1 MG tablet TAKE 1 TABLET BY MOUTH AT BEDTIME AS NEEDED     • fluticasone (FLONASE) 50 MCG/ACT nasal spray INSTILL 2 SPRAYS BY EACH NARE ROUTE DAILY.     • blood glucose (ONE TOUCH ULTRA TEST) test strip Use 1 strip once a day  E11.42  Non insulin     • ONE TOUCH LANCETS Misc Test blood sugars daily   Dx: E11.42     • levothyroxine (SYNTHROID, LEVOTHROID) 112 MCG tablet TAKE 1 TABLET BY MOUTH ONE  TIME DAILY BEFORE BREAKFAST     • metFORMIN (GLUCOPHAGE-XR) 750 MG 24 hr tablet Take 1,500 mg by mouth.     • Multiple Vitamin (TAB-A-MADELYN/BETA CAROTENE) Tab Take 1 tablet by mouth.     • omeprazole (PRILOSEC) 40 MG capsule Take 40 mg by mouth.     • pramipexole (MIRAPEX) 1.5 MG tablet Take 1.5 mg by mouth.     • ranitidine (ZANTAC) 300 MG tablet Take 300 mg by mouth.     • sertraline (ZOLOFT) 100 MG tablet TAKE 1.5 TABLETS BY MOUTH DAILY.     • simvastatin (ZOCOR) 40 MG tablet TAKE 1 TABLET BY MOUTH EVERY NIGHT     • traMADol (ULTRAM) 50 MG tablet TAKE 1 TABLET BY MOUTH EVERY 8 HOURS AS NEEDED     • cephalexin (KEFLEX) 500 MG capsule TAKE 1 CAPSULE BY MOUTH 4 TIMES DAILY UNTIL SEEN IN OFFICE  1   • doxycycline hyclate (VIBRAMYCIN) 100 MG capsule TAKE 1 TABLET BY MOUTH 2 TIMES A DAY UNTIL SEEN IN OFFICE  1   • erythromycin (ILOTYCIN) ophthalmic ointment      • HYDROcodone-acetaminophen (NORCO) 5-325 MG per tablet Take 1 tablet by mouth every 6 hours as needed.  0   • clotrimazole-betamethasone (LOTRISONE) 1-0.05 % cream      • meloxicam (MOBIC) 15 MG tablet Take 15 mg by mouth.       No current facility-administered medications for this visit.

## 2022-07-30 LAB
BUN SERPL-MCNC: 25 MG/DL (ref 9–20)
BUN/CREAT SERPL: 9 %
CALCIUM SERPL-MCNC: 8.8 MG/DL (ref 8.4–10.2)
HCT VFR BLD CALC: 40.2 % (ref 35.5–45.6)
HEMOLYSIS INDEX: 23
HGB BLD-MCNC: 13.3 GM/DL (ref 11.8–15.2)
MCHC RBC AUTO-ENTMCNC: 33 % (ref 32–34)
MCV RBC AUTO: 91 FL (ref 84–94)
PLATELET # BLD: 312 K/MM3 (ref 140–440)
RBC # BLD AUTO: 4.44 M/MM3 (ref 3.65–5.03)

## 2022-07-30 RX ADMIN — ASPIRIN SCH MG: 325 TABLET ORAL at 10:36

## 2022-07-30 RX ADMIN — HEPARIN SODIUM SCH MLS/HR: 5000 INJECTION, SOLUTION INTRAVENOUS at 10:45

## 2022-07-30 RX ADMIN — AMIODARONE HYDROCHLORIDE SCH MG: 200 TABLET ORAL at 16:59

## 2022-07-30 RX ADMIN — INSULIN HUMAN SCH UNITS: 100 INJECTION, SOLUTION PARENTERAL at 12:54

## 2022-07-30 RX ADMIN — Medication SCH ML: at 21:35

## 2022-07-30 RX ADMIN — INSULIN HUMAN SCH: 100 INJECTION, SOLUTION PARENTERAL at 07:58

## 2022-07-30 RX ADMIN — HEPARIN SODIUM SCH MLS/HR: 5000 INJECTION, SOLUTION INTRAVENOUS at 12:00

## 2022-07-30 RX ADMIN — AMIODARONE HYDROCHLORIDE SCH MG: 200 TABLET ORAL at 10:36

## 2022-07-30 RX ADMIN — METOPROLOL TARTRATE SCH MG: 25 TABLET, FILM COATED ORAL at 16:59

## 2022-07-30 RX ADMIN — METOPROLOL TARTRATE SCH MG: 25 TABLET, FILM COATED ORAL at 10:36

## 2022-07-30 RX ADMIN — AMIODARONE HYDROCHLORIDE SCH MG: 200 TABLET ORAL at 21:35

## 2022-07-30 RX ADMIN — HEPARIN SODIUM SCH MLS/HR: 5000 INJECTION, SOLUTION INTRAVENOUS at 21:39

## 2022-07-30 RX ADMIN — INSULIN HUMAN SCH: 100 INJECTION, SOLUTION PARENTERAL at 22:24

## 2022-07-30 RX ADMIN — METOPROLOL TARTRATE SCH MG: 25 TABLET, FILM COATED ORAL at 22:27

## 2022-07-30 RX ADMIN — INSULIN HUMAN SCH UNITS: 100 INJECTION, SOLUTION PARENTERAL at 17:00

## 2022-07-30 RX ADMIN — Medication SCH ML: at 10:36

## 2022-07-30 NOTE — EVENT NOTE
Date: 07/30/22


Awaiting return of renal function back to baseline for further workup of PVD. Cr

still elevated.

## 2022-07-30 NOTE — PROGRESS NOTE
Assessment and Plan


Will switch amiodarone to PO. Increase metoprolol dose.








- Patient Problems


(1) CARLEY (acute kidney injury)


Current Visit: Yes   Status: Acute   





(2) Permanent atrial fibrillation with RVR


Current Visit: Yes   Status: Acute   





(3) NSVT (nonsustained ventricular tachycardia)


Current Visit: Yes   Status: Acute   





(4) Acute on chronic HFrEF (heart failure with reduced ejection fraction)


Current Visit: Yes   Status: Acute   





(5) Cardiomyopathy


Current Visit: Yes   Status: Chronic   





(6) PAD (peripheral artery disease)


Current Visit: Yes   Status: Acute   





(7) Hyperkalemia


Current Visit: Yes   Status: Acute   





(8) Elevated troponin


Current Visit: Yes   Status: Acute   





(9) Hypertension


Current Visit: Yes   Status: Chronic   


Qualifiers: 


   Hypertension type: primary hypertension   Qualified Code(s): I10 - Essential 

(primary) hypertension   





(10) COPD (chronic obstructive pulmonary disease)


Current Visit: Yes   Status: Chronic   





(11) Diabetes mellitus


Current Visit: Yes   Status: Chronic   





Subjective


Date of service: 07/30/22


Principal diagnosis: CARLEY, Acute on chronic HFrEF, OPAD, NICMP, Permanent AF with

RVR


Interval history: 


No complaint. In AF at 122 bpm With brief intermittent runs of nonsustained VT.








Objective


                                   Vital Signs











  Temp Pulse Pulse Resp BP Pulse Ox


 


 07/30/22 15:35  98.9 F  100 H    142/98  96


 


 07/30/22 12:36   100 H    


 


 07/30/22 12:00    100 H    98


 


 07/30/22 11:25  98.3 F  70    131/98  97


 


 07/30/22 11:21  98.3 F  131 H    89/46  97


 


 07/30/22 08:28  98.3 F  109 H    137/91  96


 


 07/30/22 03:36  99.5 F  69   18  129/94  93


 


 07/30/22 01:06       97


 


 07/29/22 20:30   128 H    


 


 07/29/22 20:20  99.5 F  54 L   20  135/82  97


 


 07/29/22 19:56      126/86 


 


 07/29/22 19:01   110 H   15   98


 


 07/29/22 18:01   121 H   20   100


 


 07/29/22 17:01   98 H   12   97














- Physical Examination


General: No Apparent Distress


HEENT: Positive: EOMI, Normocephaly, Mucus Membranes Moist


Neck: Positive: neck supple, trachea midline


Cardiac: Positive: Irregularly Regular, S1/S2


Lungs: Positive: clear to auscultation


Neuro: Positive: Grossly Intact


Abdomen: Positive: Soft


Skin: Positive: Other (Right great toe gangrene)


Musculoskeletal: Normal Range of Motion


Extremities: Present: edema (Trace pitting bilateral leg edema)





- Labs and Meds


                                       CBC











  07/30/22 Range/Units





  03:54 


 


WBC  15.1 H  (4.5-11.0)  K/mm3


 


RBC  4.44  (3.65-5.03)  M/mm3


 


Hgb  13.3  (11.8-15.2)  gm/dl


 


Hct  40.2  (35.5-45.6)  %


 


Plt Count  312  (140-440)  K/mm3








                          Comprehensive Metabolic Panel











  07/30/22 Range/Units





  03:54 


 


Sodium  140  (137-145)  mmol/L


 


Potassium  4.8  D  (3.6-5.0)  mmol/L


 


Chloride  99.3  ()  mmol/L


 


Carbon Dioxide  25  (22-30)  mmol/L


 


BUN  25 H  (9-20)  mg/dL


 


Creatinine  2.9 H  (0.8-1.3)  mg/dL


 


Glucose  96  ()  mg/dL


 


Calcium  8.8  (8.4-10.2)  mg/dL














- Telemetry


EKG Rhythm: Atrial Fibrillation (with RVR; brief runs of nonsustained VT.)

## 2022-07-30 NOTE — PROGRESS NOTE
Assessment and Plan


Assessment and plan: 





This is a 42-year-old male with HTN, right-sided CVA with residual left-sided 

hemiplegia and bedridden's, HFrEF, MI s/p stent placement, DM, asthma, COPD, 

current nicotine abuse, A. fib and noncompliance with A. fib with RVR, acute 

kidney injury with hypoglycemia and severe hyperkalemia. 








#CARLEY secondary to vasomotor nephropathyimproving


 Creatinine 1.7 (previously 2.9)


 Renally dose medications and avoid nephrotoxic drugs.  Currently holding ACE 

inhibitors and ARB's.


 Nephrology consulted; appreciate recs


 Continue to monitor





#Peripheral arterial disease


#Dry gangrene of right great toe


Vascular surgery consulted; appreciate recs


 Pending angiogram and revascularization once patient's renal status has 

returned back to baseline.


 Continue heparin drip





#Atrial fibrillation


#History of MI status post stent placement


#Nonischemic cardiomyopathy


 Continue amiodarone 400 mg twice daily, heparin drip, and metoprolol tartrate 

25 mg every 6 hours


 Cardiology consulted; appreciate recs


 Continue to monitor





#Leukocytosis


 Continue to monitor.





#History of right sided CVA with residual left-sided hemiplegia


#Bedridden


 Continue goal-directed therapy





#COPD


 Continue DuoNebs and albuterol nebs as needed





#Mild protein caloric malnutrition


 Continue dietary supplementation





#Morbid obesity


#Weight loss counseling


#Exercise counseling


- BMI 41.4


- Counseled patient on the importance of weight loss, incorporating exercise, 

and dietary changes (lean meats, fresh fruits and vegetables, and water intake).

Patient expresses understanding. 


- Time: +15 min





#Tobacco dependence


#Tobacco/Smoking cessation counseling


- Counseled patient about the importance of smoking cessation and the possible 

sequelae as a result of continued tobacco consumption. The patient expresses 

understanding. 


-Time: +15 mins








Resolved diagnoses:


Hyponatremia


Hypochloremia


SIRS


Hypoglycemia


Acute metabolic encephalopathy


Acute on chronic systolic heart failure


Sepsis





#Advanced care planning


-Disease education conducted, care plan discussed, diagnoses discussed, 

prognosis discussed, and patient acknowledges understanding with care plan


-Time: +30 min








History


Interval history: 


This is a 42-year-old male with HTN, right CVA (1/2021) with residual left-sided

hemiplegia and bedridden, heart failure with reduced EF (EF 20 to 25% in 2013), 

MI in 2016, s/p stent placement in 2017, DM, COPD, current nicotine abuse, A. 

fib and noncompliance who presented to the emergency department on 7/25 via EMS 

for generalized weakness and low blood glucose.  Per EMS patient's blood glucose

levels were in the 40s and he complained of inability to urinate for 2 days and 

shortness of breath.  Patient was admitted to [facility] in January 2021 

and is status BUN/creatinine on 5/27/2020 was noted to be 18/1.0.  Patient 

presented to emergency department with fever of 99, , /90, lab work 

showed leukocytosis, hyponatremia at 128, hyperkalemia at 7.2, metabolic 

acidosis of 17, elevated BUN/creatinine at 107/15.7 and hyperglycemia 54.  

Patient was medically treated with calcium gluconate and bicarbonate, received 2

L normal saline bolus and it was noted that patient refused Zamarripa catheter 

placement.  Nephrology was consulted in the emergency department.  Patient was 

admitted to the hospitalist service with hypertensive emergency, hypoglycemia, 

hyperkalemia, acute kidney injury, A. fib RVR and CHF with consults to [facility] and 

cardiology.





Hospital course to date:





7/26: Vas-Cath placed for emergent hemodialysis for which she received.  RN aske

d to BladderScan the patient for possible placement of Zamarripa catheter.  Renal 

ultrasound pending.  Sodium bicarbonate drip was discontinued due to bicarbonate

improvement into the 20s and D10 was decreased to 75.  Digoxin discontinued.  

Neurology consulted for guidance for anticoagulation as patient was initially 

not anticoagulated for risk of hemorrhagic conversion of CVA.





7/27: Cardio will start amio as documented allergy is not a true allergy. HD 

again today. DC hydral given and decrease amlodipine re soft BPs. Bladder scan 

revealed no urine. [facility] will like to continue D10 but decrease rate and will 

start heparin gtt given afib (was on home eliquis) given possible need for 

vascath. 





7/28: Cardiology started patient on p.o. amiodarone to be given along with 

amiodarone drip, hemodialysis today.  Patient started having urine output.  

Vascular surgery consulted for great toe.  Patient will be transferred to [facility].





7/29: No acute vents reported overnight, occlusion of right posterior tib and DP

artery on vascular ultrasound.  Continue on amiodarone drip and p.o.  Patient 

started on metoprolol also.





7/30: Patient has been transferred to the floor.  Patient having clinical 

improvement with creatinine decreasing from 4.0 to 2.9.  Vascular surgery 

documented the following: "The patient had an arterial duplex that demonstrated 

evidence of aortoiliac occlusive disease as well as tibial disease in his right 

lower extremity.  He is in need of a diagnostic angiogram with possible 

intervention to improve flow to his leg and heel his right first toe wounds. We 

will continue to follow and observe his renal function.  Once he is back to his 

baseline we will discuss with nephrology the potential for the angiogram."  














Disposition Plan: Continue medical management


Total Time Spent with Patient (Minutes): 45 minutes





History


Interval history: 





No acute events overnight.





Hospitalist Physical





- Constitutional


Vitals: 


                                        











Temp Pulse Resp BP Pulse Ox


 


 98.3 F   109 H  18   137/91   96 


 


 07/30/22 08:28  07/30/22 08:28  07/30/22 03:36  07/30/22 08:28  07/30/22 08:28











General appearance: Present: no acute distress





HEART Score





- HEART Score


Age: 45-65


Risk factors: > 3 risk factors or hx of atherosclerotic disease


Troponin: 1-3x normal limit





- Critical Actions


Critical Actions: 4-6 pts:12-16.6% risk of adverse cardiac event. Should be 

admitted





Results





- Labs


CBC & Chem 7: 


                                 07/31/22 03:59





                                 07/31/22 03:59


Labs: 


                             Laboratory Last Values











WBC  15.1 K/mm3 (4.5-11.0)  H  07/30/22  03:54    


 


RBC  4.44 M/mm3 (3.65-5.03)   07/30/22  03:54    


 


Hgb  13.3 gm/dl (11.8-15.2)   07/30/22  03:54    


 


Hct  40.2 % (35.5-45.6)   07/30/22  03:54    


 


MCV  91 fl (84-94)   07/30/22  03:54    


 


MCH  30 pg (28-32)   07/30/22  03:54    


 


MCHC  33 % (32-34)   07/30/22  03:54    


 


RDW  14.3 % (13.2-15.2)   07/30/22  03:54    


 


Plt Count  312 K/mm3 (140-440)   07/30/22  03:54    


 


Add Manual Diff  Complete   07/26/22  04:16    


 


Total Counted  100   07/26/22  04:16    


 


Seg Neuts % (Manual)  78.0 % (40.0-70.0)  H  07/26/22  04:16    


 


Band Neutrophils %  0 %  07/26/22  04:16    


 


Lymphocytes % (Manual)  11.0 % (13.4-35.0)  L  07/26/22  04:16    


 


Reactive Lymphs % (Man)  0 %  07/26/22  04:16    


 


Monocytes % (Manual)  11.0 % (0.0-7.3)  H  07/26/22  04:16    


 


Eosinophils % (Manual)  0 % (0.0-4.3)   07/26/22  04:16    


 


Basophils % (Manual)  0 % (0.0-1.8)   07/26/22  04:16    


 


Metamyelocytes %  0 %  07/26/22  04:16    


 


Myelocytes %  0 %  07/26/22  04:16    


 


Promyelocytes %  0 %  07/26/22  04:16    


 


Blast Cells %  0 %  07/26/22  04:16    


 


Nucleated RBC %  Not Reportable   07/26/22  04:16    


 


Seg Neutrophils # Man  12.5 K/mm3 (1.8-7.7)  H  07/26/22  04:16    


 


Band Neutrophils #  0.0 K/mm3  07/26/22  04:16    


 


Lymphocytes # (Manual)  1.8 K/mm3 (1.2-5.4)   07/26/22  04:16    


 


Abs React Lymphs (Man)  0.0 K/mm3  07/26/22  04:16    


 


Monocytes # (Manual)  1.8 K/mm3 (0.0-0.8)  H  07/26/22  04:16    


 


Eosinophils # (Manual)  0.0 K/mm3 (0.0-0.4)   07/26/22  04:16    


 


Basophils # (Manual)  0.0 K/mm3 (0.0-0.1)   07/26/22  04:16    


 


Metamyelocytes #  0.0 K/mm3  07/26/22  04:16    


 


Myelocytes #  0.0 K/mm3  07/26/22  04:16    


 


Promyelocytes #  0.0 K/mm3  07/26/22  04:16    


 


Blast Cells #  0.0 K/mm3  07/26/22  04:16    


 


WBC Morphology  Not Reportable   07/26/22  04:16    


 


Hypersegmented Neuts  Not Reportable   07/26/22  04:16    


 


Hyposegmented Neuts  Not Reportable   07/26/22  04:16    


 


Hypogranular Neuts  Not Reportable   07/26/22  04:16    


 


Smudge Cells  Not Reportable   07/26/22  04:16    


 


Toxic Granulation  Not Reportable   07/26/22  04:16    


 


Toxic Vacuolation  Not Reportable   07/26/22  04:16    


 


Dohle Bodies  Not Reportable   07/26/22  04:16    


 


Pelger-Huet Anomaly  Not Reportable   07/26/22  04:16    


 


Cb Rods  Not Reportable   07/26/22  04:16    


 


Platelet Estimate  Consistent w auto   07/26/22  04:16    


 


Clumped Platelets  Not Reportable   07/26/22  04:16    


 


Plt Clumps, EDTA  Not Reportable   07/26/22  04:16    


 


Large Platelets  Not Reportable   07/26/22  04:16    


 


Giant Platelets  Not Reportable   07/26/22  04:16    


 


Platelet Satelliting  Not Reportable   07/26/22  04:16    


 


Plt Morphology Comment  Not Reportable   07/26/22  04:16    


 


RBC Morphology  Not Reportable   07/26/22  04:16    


 


Dimorphic RBCs  Not Reportable   07/26/22  04:16    


 


Polychromasia  Not Reportable   07/26/22  04:16    


 


Hypochromasia  Not Reportable   07/26/22  04:16    


 


Poikilocytosis  Not Reportable   07/26/22  04:16    


 


Anisocytosis  Not Reportable   07/26/22  04:16    


 


Microcytosis  Not Reportable   07/26/22  04:16    


 


Macrocytosis  Not Reportable   07/26/22  04:16    


 


Spherocytes  Not Reportable   07/26/22  04:16    


 


Pappenheimer Bodies  Not Reportable   07/26/22  04:16    


 


Sickle Cells  Not Reportable   07/26/22  04:16    


 


Target Cells  Not Reportable   07/26/22  04:16    


 


Tear Drop Cells  Not Reportable   07/26/22  04:16    


 


Ovalocytes  Not Reportable   07/26/22  04:16    


 


Helmet Cells  Not Reportable   07/26/22  04:16    


 


Ge-Thermalito Bodies  Not Reportable   07/26/22  04:16    


 


Cabot Rings  Not Reportable   07/26/22  04:16    


 


Ariel Cells  Not Reportable   07/26/22  04:16    


 


Bite Cells  Not Reportable   07/26/22  04:16    


 


Crenated Cell  Not Reportable   07/26/22  04:16    


 


Elliptocytes  Not Reportable   07/26/22  04:16    


 


Acanthocytes (Spur)  Not Reportable   07/26/22  04:16    


 


Rouleaux  Not Reportable   07/26/22  04:16    


 


Hemoglobin C Crystals  Not Reportable   07/26/22  04:16    


 


Schistocytes  Not Reportable   07/26/22  04:16    


 


Malaria parasites  Not Reportable   07/26/22  04:16    


 


Justin Bodies  Not Reportable   07/26/22  04:16    


 


Hem Pathologist Commnt  No   07/26/22  04:16    


 


PT  15.8 Sec. (12.2-14.9)  H  07/27/22  12:54    


 


INR  1.13  (0.87-1.13)   07/27/22  12:54    


 


APTT  33.5 Sec. (24.2-36.6)   07/27/22  12:54    


 


Heparin Anti-Xa Level  < 0.10 U.I./ml (0.3-0.7)  L  07/29/22  22:48    


 


Sodium  140 mmol/L (137-145)   07/30/22  03:54    


 


Potassium  4.8 mmol/L (3.6-5.0)  D 07/30/22  03:54    


 


Chloride  99.3 mmol/L ()   07/30/22  03:54    


 


Carbon Dioxide  25 mmol/L (22-30)   07/30/22  03:54    


 


Anion Gap  21 mmol/L  07/30/22  03:54    


 


BUN  25 mg/dL (9-20)  H  07/30/22  03:54    


 


Creatinine  2.9 mg/dL (0.8-1.3)  H  07/30/22  03:54    


 


Estimated GFR  29 ml/min  07/30/22  03:54    


 


BUN/Creatinine Ratio  9 %  07/30/22  03:54    


 


Glucose  96 mg/dL ()   07/30/22  03:54    


 


POC Glucose  124 mg/dL ()  H  07/30/22  07:22    


 


Calcium  8.8 mg/dL (8.4-10.2)   07/30/22  03:54    


 


Phosphorus  3.60 mg/dL (2.5-4.5)   07/29/22  05:50    


 


Magnesium  1.80 mg/dL (1.7-2.3)   07/29/22  05:50    


 


Total Bilirubin  0.70 mg/dL (0.1-1.2)   07/27/22  04:00    


 


AST  65 units/L (5-40)  H  07/27/22  04:00    


 


ALT  36 units/L (7-56)   07/27/22  04:00    


 


Alkaline Phosphatase  129 units/L ()   07/27/22  04:00    


 


NT-Pro-B Natriuret Pep  45702 pg/mL (0-450)  H  07/26/22  04:16    


 


Total Protein  6.5 g/dL (6.3-8.2)   07/27/22  04:00    


 


Albumin  2.6 g/dL (3.9-5)  L  07/27/22  04:00    


 


Albumin/Globulin Ratio  0.7 %  07/27/22  04:00    


 


Urine Osmolality  212 Mosm/kg  07/28/22  11:46    


 


Urine Creatinine  62.6 mg/dL (0.1-20.0)  H  07/28/22  07:10    


 


Urine Sodium  26 mmol/L  07/28/22  07:10    


 


Urine Total Protein  15 mg/dL (5-11.8)  H  07/28/22  07:10    


 


Hepatitis A IgM Ab  Non-reactive  (NonReactive)   07/26/22  11:10    


 


Hep Bs Antigen  Non-reactive  (Negative)   07/26/22  11:10    


 


Hep B Core IgM Ab  Non-reactive  (NonReactive)   07/26/22  11:10    


 


Hepatitis C Antibody  Non-reactive  (NonReactive)   07/26/22  11:10    











Microbiology: 


Microbiology





07/26/22 18:00   Peripheral/Venous   Blood Culture - Preliminary


                            NO GROWTH AFTER 72 HOURS


07/26/22 18:00   Peripheral/Venous   Blood Culture - Preliminary


                            NO GROWTH AFTER 72 HOURS








Zamarripa/IV: 


                                        





Voiding Method                   Urinal











Active Medications





- Current Medications


Current Medications: 














Generic Name Dose Route Start Last Admin





  Trade Name Freq  PRN Reason Stop Dose Admin


 


Acetaminophen  650 mg  07/25/22 16:54  07/27/22 09:52





  Acetaminophen 325 Mg Tab  PO   650 mg





  Q4H PRN   Administration





  Pain MILD(1-3)/Fever >100.5/HA  


 


Amiodarone HCl  200 mg  07/28/22 11:00  07/29/22 21:32





  Amiodarone 200 Mg Tab  PO   200 mg





  BID LYNDA   Administration


 


Aspirin  325 mg  07/26/22 10:00  07/29/22 10:58





  Aspirin 325 Mg Tab  PO   325 mg





  QDAY LYNDA   Administration


 


Dextrose  50 ml  07/26/22 10:45 





  Dextrose 50% In Water (25gm) 50 Ml Syringe  IV  





  Q30MIN PRN  





  Hypoglycemia  





  Protocol  


 


Glucagon  1 mg  07/25/22 15:39  07/25/22 16:08





  Glucagon (Human Recombinant) 1 Mg/Ml Inj  IV   1 mg





  Q1H PRN   Administration





  Hypoglycemia  


 


Sodium Chloride  100 mls @ 999 mls/hr  07/26/22 07:08 





  Nacl 0.9%  IV  





  CASSIDY PRN  





  Hypotension  


 


Amiodarone HCl 900 mg/  500 mls @ 33.333 mls/hr  07/27/22 11:15  07/28/22 10:00





  Dextrose  IV   0.5 mg/min





  AS DIRECT LYNDA   16.667 mls/hr





    Administration





  Protocol  





  1 MG/MIN  


 


Heparin Sodium/Sodium Chloride  25,000 unit in 500 mls @ 30 mls/hr  07/27/22 

12:00  07/30/22 00:27





  Heparin/ 0.45% Nacl-25,000 Unit/500 Ml  IV   1,800 units/hr





  TITR LYNDA   36 mls/hr





    Titration





  Protocol  





  1,500 UNITS/HR  


 


Ceftriaxone Sodium  2 gm in 100 mls @ 200 mls/hr  07/27/22 12:00  07/29/22 13:21





  Rocephin/Ns 2 Gm/100 Ml  IV  08/01/22 11:59  Not Given





  Q24H LYNDA  





  Protocol  


 


Amiodarone HCl 900 mg/  500 mls @ 33.333 mls/hr  07/29/22 17:00  07/29/22 16:27





  Dextrose  IV   1 mg/min





  AS DIRECT LYNDA   33.333 mls/hr





    Administration





  Protocol  





  1 MG/MIN  


 


Insulin Human Regular  0 units  07/29/22 16:30  07/30/22 07:58





  Insulin Regular, Human 100 Units/1 Ml  SUB-Q   Not Given





  [facility]  





  Protocol  


 


Metoprolol Tartrate  25 mg  07/29/22 11:30  07/29/22 21:32





  Metoprolol Tartrate 25 Mg Tab  PO   25 mg





  BID LYNDA   Administration


 


Morphine Sulfate  2 mg  07/25/22 16:54  07/27/22 13:48





  Morphine 2 Mg/1 Ml Inj  IV   2 mg





  Q4H PRN   Administration





  Pain, Moderate (4-6)  


 


Ondansetron HCl  4 mg  07/27/22 12:00  07/27/22 13:48





  Ondansetron 4 Mg/2 Ml Inj  IV   4 mg





  Q6H PRN   Administration





  Nausea And Vomiting  


 


Oxycodone/Acetaminophen  1 tab  07/25/22 16:54  07/29/22 10:58





  Oxycodone /Acetaminophen 5-325mg Tab  PO   1 tab





  Q6H PRN   Administration





  Pain, Moderate (4-6)  


 


Sodium Chloride  10 ml  07/25/22 22:00  07/29/22 21:32





  Sodium Chloride 0.9% 10 Ml Flush Syringe  IV   10 ml





  BID LYNDA   Administration


 


Sodium Chloride  10 ml  07/25/22 16:54 





  Sodium Chloride 0.9% 10 Ml Flush Syringe  IV  





  PRN PRN  





  LINE FLUSH

## 2022-07-31 LAB
BAND NEUTROPHILS # (MANUAL): 0.3 K/MM3
BUN SERPL-MCNC: 20 MG/DL (ref 9–20)
BUN/CREAT SERPL: 12 %
CALCIUM SERPL-MCNC: 8.7 MG/DL (ref 8.4–10.2)
HCT VFR BLD CALC: 38.7 % (ref 35.5–45.6)
HEMOLYSIS INDEX: 18
HGB BLD-MCNC: 12.9 GM/DL (ref 11.8–15.2)
MCHC RBC AUTO-ENTMCNC: 33 % (ref 32–34)
MCV RBC AUTO: 91 FL (ref 84–94)
MYELOCYTES # (MANUAL): 0 K/MM3
PLATELET # BLD: 336 K/MM3 (ref 140–440)
PROMYELOCYTES # (MANUAL): 0 K/MM3
RBC # BLD AUTO: 4.24 M/MM3 (ref 3.65–5.03)
TOTAL CELLS COUNTED BLD: 100

## 2022-07-31 RX ADMIN — Medication SCH ML: at 10:38

## 2022-07-31 RX ADMIN — OXYCODONE AND ACETAMINOPHEN PRN TAB: 5; 325 TABLET ORAL at 22:03

## 2022-07-31 RX ADMIN — HEPARIN SODIUM SCH MLS/HR: 5000 INJECTION, SOLUTION INTRAVENOUS at 22:04

## 2022-07-31 RX ADMIN — AMIODARONE HYDROCHLORIDE SCH MG: 200 TABLET ORAL at 10:38

## 2022-07-31 RX ADMIN — INSULIN HUMAN SCH: 100 INJECTION, SOLUTION PARENTERAL at 22:11

## 2022-07-31 RX ADMIN — Medication SCH ML: at 22:45

## 2022-07-31 RX ADMIN — METOPROLOL TARTRATE SCH MG: 25 TABLET, FILM COATED ORAL at 10:40

## 2022-07-31 RX ADMIN — AMIODARONE HYDROCHLORIDE SCH MG: 200 TABLET ORAL at 20:54

## 2022-07-31 RX ADMIN — OXYCODONE AND ACETAMINOPHEN PRN TAB: 5; 325 TABLET ORAL at 15:24

## 2022-07-31 RX ADMIN — INSULIN HUMAN SCH: 100 INJECTION, SOLUTION PARENTERAL at 12:13

## 2022-07-31 RX ADMIN — ASPIRIN SCH MG: 325 TABLET ORAL at 10:38

## 2022-07-31 RX ADMIN — INSULIN HUMAN SCH: 100 INJECTION, SOLUTION PARENTERAL at 16:41

## 2022-07-31 RX ADMIN — METOPROLOL TARTRATE SCH MG: 25 TABLET, FILM COATED ORAL at 05:50

## 2022-07-31 RX ADMIN — INSULIN HUMAN SCH: 100 INJECTION, SOLUTION PARENTERAL at 07:38

## 2022-07-31 RX ADMIN — AMIODARONE HYDROCHLORIDE SCH MG: 200 TABLET ORAL at 13:55

## 2022-07-31 RX ADMIN — HEPARIN SODIUM SCH MLS/HR: 5000 INJECTION, SOLUTION INTRAVENOUS at 10:38

## 2022-07-31 RX ADMIN — METOPROLOL TARTRATE SCH MG: 25 TABLET, FILM COATED ORAL at 17:24

## 2022-07-31 NOTE — PROGRESS NOTE
Assessment and Plan


Continue loading doses of amiodarone for now. Increase metoprolol to 50 mg Q8 

hours.








- Patient Problems


(1) CARLEY (acute kidney injury)


Current Visit: Yes   Status: Acute   





(2) Permanent atrial fibrillation with RVR


Current Visit: Yes   Status: Acute   





(3) NSVT (nonsustained ventricular tachycardia)


Current Visit: Yes   Status: Acute   





(4) Acute on chronic HFrEF (heart failure with reduced ejection fraction)


Current Visit: Yes   Status: Acute   





(5) Cardiomyopathy


Current Visit: Yes   Status: Chronic   





(6) PAD (peripheral artery disease)


Current Visit: Yes   Status: Acute   





(7) Hyperkalemia


Current Visit: Yes   Status: Acute   





(8) Elevated troponin


Current Visit: Yes   Status: Acute   





(9) Hypertension


Current Visit: Yes   Status: Chronic   


Qualifiers: 


   Hypertension type: primary hypertension   Qualified Code(s): I10 - Essential 

(primary) hypertension   





(10) COPD (chronic obstructive pulmonary disease)


Current Visit: Yes   Status: Chronic   





(11) Diabetes mellitus


Current Visit: Yes   Status: Chronic   





Subjective


Date of service: 07/31/22


Principal diagnosis: CARLEY, Acute on chronic HFrEF, OPAD, NICMP, Permanent AF with

RVR


Interval history: 


No complaint. Remains in AF with mildly RVR.








Objective


                                   Vital Signs











  Temp Pulse Resp BP Pulse Ox


 


 07/31/22 12:19   98 H   


 


 07/31/22 12:18  98.0 F  96 H   138/106  100


 


 07/31/22 12:00      97


 


 07/31/22 08:04  98.2 F  100 H   138/91  99


 


 07/31/22 05:47  98.5 F  96 H  18  150/94  98


 


 07/30/22 23:17  98.9 F  88  18  117/96  96


 


 07/30/22 22:00   114 H   


 


 07/30/22 20:00      98


 


 07/30/22 19:29  98.6 F  83  19  138/95  98














- Physical Examination


General: No Apparent Distress


HEENT: Positive: EOMI, Normocephaly, Mucus Membranes Moist


Neck: Positive: neck supple, trachea midline


Cardiac: Positive: irregularly irregular, S1/S2


Lungs: Positive: clear to auscultation


Neuro: Positive: Grossly Intact


Abdomen: Positive: Soft, Active Bowel Sounds.  Negative: Tender


Skin: Positive: Other (Right great toe gangrene)


Musculoskeletal: Normal Range of Motion


Extremities: Present: edema (Trace pitting bilateral leg edema)





- Labs and Meds


                                       CBC











  07/31/22 Range/Units





  03:59 


 


WBC  13.2 H  (4.5-11.0)  K/mm3


 


RBC  4.24  (3.65-5.03)  M/mm3


 


Hgb  12.9  (11.8-15.2)  gm/dl


 


Hct  38.7  (35.5-45.6)  %


 


Plt Count  336  (140-440)  K/mm3








                          Comprehensive Metabolic Panel











  07/31/22 Range/Units





  03:59 


 


Sodium  134 L  (137-145)  mmol/L


 


Potassium  4.0  (3.6-5.0)  mmol/L


 


Chloride  101.5  ()  mmol/L


 


Carbon Dioxide  25  (22-30)  mmol/L


 


BUN  20  (9-20)  mg/dL


 


Creatinine  1.7 H  (0.8-1.3)  mg/dL


 


Glucose  171 H  ()  mg/dL


 


Calcium  8.7  (8.4-10.2)  mg/dL














- Telemetry


EKG Rhythm: Atrial Fibrillation (with RVR)





- Allied health notes


Allied health notes reviewed: nursing

## 2022-07-31 NOTE — PROGRESS NOTE
Assessment and Plan





- Patient Problems


(1) CARLEY (acute kidney injury)


Current Visit: Yes   Status: Acute   


Plan to address problem: 


Patient has not required any further dialysis at this time and renal function 

continues to improve.  He has excellent urine output at this time.  We will plan

to have the dialysis nurses remove his Vas-Cath tomorrow.








(2) Acute on chronic HFrEF (heart failure with reduced ejection fraction)


Current Visit: Yes   Status: Acute   


Plan to address problem: 


Cardiology recommendations reviewed.  Diuretics as recommended.  Counseled on im

portance of fluid and sodium restrictions.  Currently patient seems euvolemic on

examination.  Overall respiratory status is stable.  We will continue to monitor

closely.








(3) Hyperkalemia


Current Visit: Yes   Status: Acute   


Plan to address problem: 


Potassium levels have improved with dialysis.  We will continue to monitor 

closely.  Counseled patient on the importance of a low potassium diet in 

general.








(4) Hyponatremia


Current Visit: Yes   Status: Acute   


Plan to address problem: 


Likely in the setting of volume overload.  Improvement since admission.








(5) Diabetes mellitus


Current Visit: Yes   Status: Chronic   


Plan to address problem: 


Diabetes management per primary attending.








Subjective


Date of service: 07/31/22


Principal diagnosis: CARLEY, Acute on chronic HFrEF, OPAD, NICMP, Permanent AF with

RVR


Interval history: 





No acute issues this morning.  Labs indicate further improvement of his renal 

function.  We will plan to remove his Vas-Cath at present time.  Urine output 

continues to improve.





Objective





- Vital Signs


Vital signs: 


                               Vital Signs - 12hr











  07/30/22 07/31/22 07/31/22





  23:17 05:47 08:04


 


Temperature 98.9 F 98.5 F 98.2 F


 


Pulse Rate 88 96 H 100 H


 


Respiratory 18 18 





Rate   


 


Blood Pressure 117/96 150/94 138/91


 


O2 Sat by Pulse 96 98 99





Oximetry   














- General Appearance


General appearance: well-developed, appears stated age, obese


EENT: ATNC


Neck: no JVD


Respiratory: Present: Clear to Ascultation


Cardiology: regular


Gastrointestinal: normal


Integumentary: no rash


Neurologic: no focal deficit


Musculoskeletal: deferred


Psychiatric: cooperative





- Lab





                                 07/31/22 03:59





                                 07/31/22 03:59


                             Most recent lab results











Calcium  8.7 mg/dL (8.4-10.2)   07/31/22  03:59    


 


Phosphorus  3.60 mg/dL (2.5-4.5)   07/29/22  05:50    


 


Magnesium  1.80 mg/dL (1.7-2.3)   07/29/22  05:50    


 


Urine Creatinine  62.6 mg/dL (0.1-20.0)  H  07/28/22  07:10    


 


Urine Sodium  26 mmol/L  07/28/22  07:10    


 


Urine Total Protein  15 mg/dL (5-11.8)  H  07/28/22  07:10    














- Imaging


Chest x-ray: pending





- Allied health notes


Allied health notes reviewed: nursing





Medications & Allergies





- Medications


Allergies/Adverse Reactions: 


                                    Allergies





adhesive Allergy (Verified 07/25/22 14:07)


   Rash


ibuprofen [From Motrin] Allergy (Verified 07/25/22 14:07)


   Swelling


lisinopril Allergy (Verified 07/25/22 14:07)


   COUGH


metoprolol Allergy (Verified 07/25/22 14:07)


   Nausea


amiodarone Adverse Reaction (Verified 07/25/22 14:07)


   Nausea


Iodinated Contrast Media [Iodinated Contrast Media - IV Dye] Adverse Reaction 

(Verified 07/25/22 14:07)


   KIDNEYS SHUT DOWN








Home Medications: 


                                Home Medications











 Medication  Instructions  Recorded  Confirmed  Last Taken  Type


 


Digoxin [Lanoxin] 0.25 mg PO DAILY@1700 #30 tablet 01/05/15 06/27/17 06/26/17 Rx


 


Aspirin 325 mg PO QDAY 06/27/17 06/27/17 06/26/17 History


 


Furosemide [Lasix TAB] 40 mg PO QDAY 06/27/17 06/27/17 06/26/17 History


 


Losartan [Cozaar] 50 mg PO QDAY 06/27/17 06/27/17 06/26/17 History


 


HYDROcodone/APAP 5-325 [[place] 1 each PO Q6HR PRN #20 tablet 02/27/20  Unknown Rx





5/325]     


 


Penicillin Vk [Veetids TAB] 500 mg PO QID #21 tablet 02/27/20  Unknown Rx


 


Acetaminophen [Acetaminophen TAB] 1,000 mg PO Q6HR #30 tablet 05/27/20  Unknown 

Rx


 


cephALEXin [Keflex] 500 mg PO Q8HR #30 cap 05/27/20  Unknown Rx











Active Medications: 














Generic Name Dose Route Start Last Admin





  Trade Name Freq  PRN Reason Stop Dose Admin


 


Acetaminophen  650 mg  07/25/22 16:54  07/27/22 09:52





  Acetaminophen 325 Mg Tab  PO   650 mg





  Q4H PRN   Administration





  Pain MILD(1-3)/Fever >100.5/HA  


 


Amiodarone HCl  400 mg  07/30/22 17:00  07/30/22 21:35





  Amiodarone 200 Mg Tab  PO   400 mg





  TID LYNDA   Administration


 


Aspirin  325 mg  07/26/22 10:00  07/30/22 10:36





  Aspirin 325 Mg Tab  PO   325 mg





  QDAY LYNDA   Administration


 


Dextrose  50 ml  07/26/22 10:45 





  Dextrose 50% In Water (25gm) 50 Ml Syringe  IV  





  Q30MIN PRN  





  Hypoglycemia  





  Protocol  


 


Glucagon  1 mg  07/25/22 15:39  07/25/22 16:08





  Glucagon (Human Recombinant) 1 Mg/Ml Inj  IV   1 mg





  Q1H PRN   Administration





  Hypoglycemia  


 


Sodium Chloride  100 mls @ 999 mls/hr  07/26/22 07:08 





  Nacl 0.9%  IV  





  CASSIDY PRN  





  Hypotension  


 


Heparin Sodium/Sodium Chloride  25,000 unit in 500 mls @ 30 mls/hr  07/27/22 12

:00  07/31/22 03:29





  Heparin/ 0.45% Nacl-25,000 Unit/500 Ml  IV   2,300 units/hr





  TITR LYNDA   46 mls/hr





    Titration





  Protocol  





  1,500 UNITS/HR  


 


Insulin Human Regular  0 units  07/29/22 16:30  07/31/22 07:38





  Insulin Regular, Human 100 Units/1 Ml  SUB-Q   Not Given





  [facility]  





  Protocol  


 


Metoprolol Tartrate  25 mg  07/30/22 17:00  07/31/22 05:50





  Metoprolol Tartrate 25 Mg Tab  PO   25 mg





  Q6H LYNDA   Administration


 


Morphine Sulfate  2 mg  07/25/22 16:54  07/27/22 13:48





  Morphine 2 Mg/1 Ml Inj  IV   2 mg





  Q4H PRN   Administration





  Pain, Moderate (4-6)  


 


Ondansetron HCl  4 mg  07/27/22 12:00  07/27/22 13:48





  Ondansetron 4 Mg/2 Ml Inj  IV   4 mg





  Q6H PRN   Administration





  Nausea And Vomiting  


 


Oxycodone/Acetaminophen  1 tab  07/25/22 16:54  07/29/22 10:58





  Oxycodone /Acetaminophen 5-325mg Tab  PO   1 tab





  Q6H PRN   Administration





  Pain, Moderate (4-6)  


 


Sodium Chloride  10 ml  07/25/22 22:00  07/30/22 21:35





  Sodium Chloride 0.9% 10 Ml Flush Syringe  IV   10 ml





  BID LYNDA   Administration


 


Sodium Chloride  10 ml  07/25/22 16:54 





  Sodium Chloride 0.9% 10 Ml Flush Syringe  IV  





  PRN PRN  





  LINE FLUSH

## 2022-07-31 NOTE — EVENT NOTE
Date: 07/31/22


Awaiting return of renal function back to baseline for further workup of PVD. Cr

still elevated above baseline.





Maintain on heparin drip. Once arterial disease addressed, then can convert to 

long term anticoagulation.

## 2022-07-31 NOTE — PROGRESS NOTE
Assessment and Plan


Assessment and plan: 





This is a 42-year-old male with HTN, right-sided CVA with residual left-sided 

hemiplegia and bedridden's, HFrEF, MI s/p stent placement, DM, asthma, COPD, 

current nicotine abuse, A. fib and noncompliance with A. fib with RVR, acute 

kidney injury with hypoglycemia and severe hyperkalemia. 








#CARLEY secondary to vasomotor nephropathyimproving


 Creatinine 1.7 (previously 2.9)


 Renally dose medications and avoid nephrotoxic drugs.  Currently holding ACE 

inhibitors and ARB's.


 Nephrology consulted; appreciate recs


 Continue to monitor





#Peripheral arterial disease


#Dry gangrene of right great toe


Vascular surgery consulted; appreciate recs


 Pending angiogram and revascularization once patient's renal status has 

returned back to baseline.


 Continue heparin drip





#Atrial fibrillation


#History of MI status post stent placement


#Nonischemic cardiomyopathy


 Continue amiodarone 400 mg twice daily, heparin drip, and metoprolol tartrate 

25 mg every 6 hours


 Cardiology consulted; appreciate recs


 Continue to monitor





#Leukocytosis


 Continue to monitor.





#History of right sided CVA with residual left-sided hemiplegia


#Bedridden


 Continue goal-directed therapy





#COPD


 Continue DuoNebs and albuterol nebs as needed





#Mild protein caloric malnutrition


 Continue dietary supplementation





#Morbid obesity


#Weight loss counseling


#Exercise counseling


- BMI 41.4


- Counseled patient on the importance of weight loss, incorporating exercise, 

and dietary changes (lean meats, fresh fruits and vegetables, and water intake).

Patient expresses understanding. 


- Time: +15 min





#Tobacco dependence


#Tobacco/Smoking cessation counseling


- Counseled patient about the importance of smoking cessation and the possible 

sequelae as a result of continued tobacco consumption. The patient expresses 

understanding. 


-Time: +15 mins








Resolved diagnoses:


Hyponatremia


Hypochloremia


SIRS


Hypoglycemia


Acute metabolic encephalopathy


Acute on chronic systolic heart failure


Sepsis





#Advanced care planning


-Disease education conducted, care plan discussed, diagnoses discussed, 

prognosis discussed, and patient acknowledges understanding with care plan


-Time: +30 min








History


Interval history: 


This is a 42-year-old male with HTN, right CVA (1/2021) with residual left-sided

hemiplegia and bedridden, heart failure with reduced EF (EF 20 to 25% in 2013), 

MI in 2016, s/p stent placement in 2017, DM, COPD, current nicotine abuse, A. 

fib and noncompliance who presented to the emergency department on 7/25 via EMS 

for generalized weakness and low blood glucose.  Per EMS patient's blood glucose

levels were in the 40s and he complained of inability to urinate for 2 days and 

shortness of breath.  Patient was admitted to [facility] in January 2021 

and is status BUN/creatinine on 5/27/2020 was noted to be 18/1.0.  Patient 

presented to emergency department with fever of 99, , /90, lab work 

showed leukocytosis, hyponatremia at 128, hyperkalemia at 7.2, metabolic 

acidosis of 17, elevated BUN/creatinine at 107/15.7 and hyperglycemia 54.  

Patient was medically treated with calcium gluconate and bicarbonate, received 2

L normal saline bolus and it was noted that patient refused Zamarripa catheter 

placement.  Nephrology was consulted in the emergency department.  Patient was 

admitted to the hospitalist service with hypertensive emergency, hypoglycemia, 

hyperkalemia, acute kidney injury, A. fib RVR and CHF with consults to [facility] and 

cardiology.





Hospital course to date:





7/26: Vas-Cath placed for emergent hemodialysis for which she received.  RN aske

d to BladderScan the patient for possible placement of Zamarripa catheter.  Renal 

ultrasound pending.  Sodium bicarbonate drip was discontinued due to bicarbonate

improvement into the 20s and D10 was decreased to 75.  Digoxin discontinued.  

Neurology consulted for guidance for anticoagulation as patient was initially 

not anticoagulated for risk of hemorrhagic conversion of CVA.





7/27: Cardio will start amio as documented allergy is not a true allergy. HD 

again today. DC hydral given and decrease amlodipine re soft BPs. Bladder scan 

revealed no urine. [facility] will like to continue D10 but decrease rate and will 

start heparin gtt given afib (was on home eliquis) given possible need for 

vascath. 





7/28: Cardiology started patient on p.o. amiodarone to be given along with 

amiodarone drip, hemodialysis today.  Patient started having urine output.  

Vascular surgery consulted for great toe.  Patient will be transferred to [facility].





7/29: No acute vents reported overnight, occlusion of right posterior tib and DP

artery on vascular ultrasound.  Continue on amiodarone drip and p.o.  Patient 

started on metoprolol also.





7/30: Patient has been transferred to the floor.  Patient having clinical 

improvement with creatinine decreasing from 4.0 to 2.9.  Vascular surgery 

documented the following: "The patient had an arterial duplex that demonstrated 

evidence of aortoiliac occlusive disease as well as tibial disease in his right 

lower extremity.  He is in need of a diagnostic angiogram with possible 

intervention to improve flow to his leg and heel his right first toe wounds. We 

will continue to follow and observe his renal function.  Once he is back to his 

baseline we will discuss with nephrology the potential for the angiogram."  





7/31: Patient continued to have improvement of renal function with creatinine of

1.7.  Continue with medical management.








Disposition Plan:  continue medical management


Total Time Spent with Patient (Minutes): 45 minutes





History


Interval history: 





No acute events overnight.





Hospitalist Physical





- Constitutional


Vitals: 


                                        











Temp Pulse Resp BP Pulse Ox


 


 98.0 F   98 H  18   138/106   100 


 


 07/31/22 12:18  07/31/22 12:19  07/31/22 05:47  07/31/22 12:18  07/31/22 12:18











General appearance: Present: no acute distress, well-nourished, obese





- EENT


Eyes: Present: PERRL, EOM intact


ENT: hearing intact, clear oral mucosa, dentition normal





- Neck


Neck: Present: supple, normal ROM





- Respiratory


Respiratory effort: normal


Respiratory: bilateral: CTA





- Cardiovascular


Rhythm: irregularly irregular


Heart Sounds: Present: S1 & S2





- Extremities


Extremities: no ischemia, pulses intact, pulses symmetrical, No edema, normal 

temperature, normal color


Extremity abnormal: black (Dry gangrene of right great toe)


Peripheral Pulses: within normal limits





- Abdominal


General gastrointestinal: soft, non-tender, non-distended, normal bowel sounds





- Integumentary


Integumentary: Present: clear, warm, dry





- Psychiatric


Psychiatric: appropriate mood/affect, intact judgment & insight, cooperative





- Neurologic


Neurologic: CNII-XII intact





- Allied Health


Allied health notes reviewed: nursing





HEART Score





- HEART Score


Age: 45-65


Risk factors: > 3 risk factors or hx of atherosclerotic disease


Troponin: 1-3x normal limit





- Critical Actions


Critical Actions: 4-6 pts:12-16.6% risk of adverse cardiac event. Should be 

admitted





Results





- Labs


CBC & Chem 7: 


                                 07/31/22 03:59





                                 07/31/22 03:59


Labs: 


                             Laboratory Last Values











WBC  13.2 K/mm3 (4.5-11.0)  H  07/31/22  03:59    


 


RBC  4.24 M/mm3 (3.65-5.03)   07/31/22  03:59    


 


Hgb  12.9 gm/dl (11.8-15.2)   07/31/22  03:59    


 


Hct  38.7 % (35.5-45.6)   07/31/22  03:59    


 


MCV  91 fl (84-94)   07/31/22  03:59    


 


MCH  30 pg (28-32)   07/31/22  03:59    


 


MCHC  33 % (32-34)   07/31/22  03:59    


 


RDW  14.4 % (13.2-15.2)   07/31/22  03:59    


 


Plt Count  336 K/mm3 (140-440)   07/31/22  03:59    


 


Add Manual Diff  Complete   07/31/22  03:59    


 


Total Counted  100   07/31/22  03:59    


 


Seg Neuts % (Manual)  74.0 % (40.0-70.0)  H  07/31/22  03:59    


 


Band Neutrophils %  2.0 %  07/31/22  03:59    


 


Lymphocytes % (Manual)  12.0 % (13.4-35.0)  L  07/31/22  03:59    


 


Reactive Lymphs % (Man)  0 %  07/31/22  03:59    


 


Monocytes % (Manual)  6.0 % (0.0-7.3)   07/31/22  03:59    


 


Eosinophils % (Manual)  4.0 % (0.0-4.3)   07/31/22  03:59    


 


Basophils % (Manual)  2.0 % (0.0-1.8)  H  07/31/22  03:59    


 


Metamyelocytes %  0 %  07/31/22  03:59    


 


Myelocytes %  0 %  07/31/22  03:59    


 


Promyelocytes %  0 %  07/31/22  03:59    


 


Blast Cells %  0 %  07/31/22  03:59    


 


Nucleated RBC %  Not Reportable   07/31/22  03:59    


 


Seg Neutrophils # Man  9.8 K/mm3 (1.8-7.7)  H  07/31/22  03:59    


 


Band Neutrophils #  0.3 K/mm3  07/31/22  03:59    


 


Lymphocytes # (Manual)  1.6 K/mm3 (1.2-5.4)   07/31/22  03:59    


 


Abs React Lymphs (Man)  0.0 K/mm3  07/31/22  03:59    


 


Monocytes # (Manual)  0.8 K/mm3 (0.0-0.8)   07/31/22  03:59    


 


Eosinophils # (Manual)  0.5 K/mm3 (0.0-0.4)  H  07/31/22  03:59    


 


Basophils # (Manual)  0.3 K/mm3 (0.0-0.1)  H  07/31/22  03:59    


 


Metamyelocytes #  0.0 K/mm3  07/31/22  03:59    


 


Myelocytes #  0.0 K/mm3  07/31/22  03:59    


 


Promyelocytes #  0.0 K/mm3  07/31/22  03:59    


 


Blast Cells #  0.0 K/mm3  07/31/22  03:59    


 


WBC Morphology  Not Reportable   07/31/22  03:59    


 


Hypersegmented Neuts  Not Reportable   07/31/22  03:59    


 


Hyposegmented Neuts  Not Reportable   07/31/22  03:59    


 


Hypogranular Neuts  Not Reportable   07/31/22  03:59    


 


Smudge Cells  Not Reportable   07/31/22  03:59    


 


Toxic Granulation  Not Reportable   07/31/22  03:59    


 


Toxic Vacuolation  Not Reportable   07/31/22  03:59    


 


Dohle Bodies  Not Reportable   07/31/22  03:59    


 


Pelger-Huet Anomaly  Not Reportable   07/31/22  03:59    


 


Cb Rods  Not Reportable   07/31/22  03:59    


 


Platelet Estimate  Consistent w auto   07/31/22  03:59    


 


Clumped Platelets  Not Reportable   07/31/22  03:59    


 


Plt Clumps, EDTA  Not Reportable   07/31/22  03:59    


 


Large Platelets  Not Reportable   07/31/22  03:59    


 


Giant Platelets  Not Reportable   07/31/22  03:59    


 


Platelet Satelliting  Not Reportable   07/31/22  03:59    


 


Plt Morphology Comment  Not Reportable   07/31/22  03:59    


 


RBC Morphology  Normal   07/31/22  03:59    


 


Dimorphic RBCs  Not Reportable   07/31/22  03:59    


 


Polychromasia  Not Reportable   07/31/22  03:59    


 


Hypochromasia  Not Reportable   07/31/22  03:59    


 


Poikilocytosis  Not Reportable   07/31/22  03:59    


 


Anisocytosis  Not Reportable   07/31/22  03:59    


 


Microcytosis  Not Reportable   07/31/22  03:59    


 


Macrocytosis  Not Reportable   07/31/22  03:59    


 


Spherocytes  Not Reportable   07/31/22  03:59    


 


Pappenheimer Bodies  Not Reportable   07/31/22  03:59    


 


Sickle Cells  Not Reportable   07/31/22  03:59    


 


Target Cells  Not Reportable   07/31/22  03:59    


 


Tear Drop Cells  Not Reportable   07/31/22  03:59    


 


Ovalocytes  Not Reportable   07/31/22  03:59    


 


Helmet Cells  Not Reportable   07/31/22  03:59    


 


[place] Bodies  Not Reportable   07/31/22  03:59    


 


Cabot Rings  Not Reportable   07/31/22  03:59    


 


[place] Cells  Not Reportable   07/31/22  03:59    


 


Bite Cells  Not Reportable   07/31/22  03:59    


 


Crenated Cell  Not Reportable   07/31/22  03:59    


 


Elliptocytes  Not Reportable   07/31/22  03:59    


 


Acanthocytes (Spur)  Not Reportable   07/31/22  03:59    


 


Rouleaux  Not Reportable   07/31/22  03:59    


 


Hemoglobin C Crystals  Not Reportable   07/31/22  03:59    


 


Schistocytes  Not Reportable   07/31/22  03:59    


 


Malaria parasites  Not Reportable   07/31/22  03:59    


 


Justin Bodies  Not Reportable   07/31/22  03:59    


 


Hem Pathologist Commnt  No   07/31/22  03:59    


 


PT  15.8 Sec. (12.2-14.9)  H  07/27/22  12:54    


 


INR  1.13  (0.87-1.13)   07/27/22  12:54    


 


APTT  33.5 Sec. (24.2-36.6)   07/27/22  12:54    


 


Heparin Anti-Xa Level  0.16 U.I./ml (0.3-0.7)  L  07/31/22  02:02    


 


Sodium  134 mmol/L (137-145)  L  07/31/22  03:59    


 


Potassium  4.0 mmol/L (3.6-5.0)   07/31/22  03:59    


 


Chloride  101.5 mmol/L ()   07/31/22  03:59    


 


Carbon Dioxide  25 mmol/L (22-30)   07/31/22  03:59    


 


Anion Gap  12 mmol/L  07/31/22  03:59    


 


BUN  20 mg/dL (9-20)   07/31/22  03:59    


 


Creatinine  1.7 mg/dL (0.8-1.3)  H  07/31/22  03:59    


 


Estimated GFR  54 ml/min  07/31/22  03:59    


 


BUN/Creatinine Ratio  12 %  07/31/22  03:59    


 


Glucose  171 mg/dL ()  H  07/31/22  03:59    


 


POC Glucose  140 mg/dL ()  H  07/31/22  12:05    


 


Calcium  8.7 mg/dL (8.4-10.2)   07/31/22  03:59    


 


Phosphorus  3.60 mg/dL (2.5-4.5)   07/29/22  05:50    


 


Magnesium  1.80 mg/dL (1.7-2.3)   07/29/22  05:50    


 


Total Bilirubin  0.70 mg/dL (0.1-1.2)   07/27/22  04:00    


 


AST  65 units/L (5-40)  H  07/27/22  04:00    


 


ALT  36 units/L (7-56)   07/27/22  04:00    


 


Alkaline Phosphatase  129 units/L ()   07/27/22  04:00    


 


NT-Pro-B Natriuret Pep  12340 pg/mL (0-450)  H  07/26/22  04:16    


 


Total Protein  6.5 g/dL (6.3-8.2)   07/27/22  04:00    


 


Albumin  2.6 g/dL (3.9-5)  L  07/27/22  04:00    


 


Albumin/Globulin Ratio  0.7 %  07/27/22  04:00    


 


Urine Osmolality  212 Mosm/kg  07/28/22  11:46    


 


Urine Creatinine  62.6 mg/dL (0.1-20.0)  H  07/28/22  07:10    


 


Urine Sodium  26 mmol/L  07/28/22  07:10    


 


Urine Total Protein  15 mg/dL (5-11.8)  H  07/28/22  07:10    


 


Coronavirus (PCR)  Negative  (Negative)   07/30/22  11:58    


 


Hepatitis A IgM Ab  Non-reactive  (NonReactive)   07/26/22  11:10    


 


Hep Bs Antigen  Non-reactive  (Negative)   07/26/22  11:10    


 


Hep B Core IgM Ab  Non-reactive  (NonReactive)   07/26/22  11:10    


 


Hepatitis C Antibody  Non-reactive  (NonReactive)   07/26/22  11:10    











Microbiology: 


Microbiology





07/26/22 18:00   Peripheral/Venous   Blood Culture - Preliminary


                            NO GROWTH AFTER 4 DAYS


07/26/22 18:00   Peripheral/Venous   Blood Culture - Preliminary


                            NO GROWTH AFTER 4 DAYS








Zamarripa/IV: 


                                        





Voiding Method                   Urinal











Active Medications





- Current Medications


Current Medications: 














Generic Name Dose Route Start Last Admin





  Trade Name Freq  PRN Reason Stop Dose Admin


 


Acetaminophen  650 mg  07/25/22 16:54  07/27/22 09:52





  Acetaminophen 325 Mg Tab  PO   650 mg





  Q4H PRN   Administration





  Pain MILD(1-3)/Fever >100.5/HA  


 


Amiodarone HCl  400 mg  07/30/22 17:00  07/31/22 10:38





  Amiodarone 200 Mg Tab  PO   400 mg





  TID LYNDA   Administration


 


Aspirin  325 mg  07/26/22 10:00  07/31/22 10:38





  Aspirin 325 Mg Tab  PO   325 mg





  QDAY LYNDA   Administration


 


Dextrose  50 ml  07/26/22 10:45 





  Dextrose 50% In Water (25gm) 50 Ml Syringe  IV  





  Q30MIN PRN  





  Hypoglycemia  





  Protocol  


 


Glucagon  1 mg  07/25/22 15:39  07/25/22 16:08





  Glucagon (Human Recombinant) 1 Mg/Ml Inj  IV   1 mg





  Q1H PRN   Administration





  Hypoglycemia  


 


Sodium Chloride  100 mls @ 999 mls/hr  07/26/22 07:08 





  Nacl 0.9%  IV  





  CASSIDY PRN  





  Hypotension  


 


Heparin Sodium/Sodium Chloride  25,000 unit in 500 mls @ 30 mls/hr  07/27/22 

12:00  07/31/22 10:38





  Heparin/ 0.45% Nacl-25,000 Unit/500 Ml  IV   2,300 units/hr





  TITR LYNDA   46 mls/hr





    Administration





  Protocol  





  1,500 UNITS/HR  


 


Insulin Human Regular  0 units  07/29/22 16:30  07/31/22 12:13





  Insulin Regular, Human 100 Units/1 Ml  SUB-Q   Not Given





  [facility]  





  Protocol  


 


Metoprolol Tartrate  25 mg  07/30/22 17:00  07/31/22 10:40





  Metoprolol Tartrate 25 Mg Tab  PO   25 mg





  Q6H LYNDA   Administration


 


Morphine Sulfate  2 mg  07/25/22 16:54  07/27/22 13:48





  Morphine 2 Mg/1 Ml Inj  IV   2 mg





  Q4H PRN   Administration





  Pain, Moderate (4-6)  


 


Ondansetron HCl  4 mg  07/27/22 12:00  07/27/22 13:48





  Ondansetron 4 Mg/2 Ml Inj  IV   4 mg





  Q6H PRN   Administration





  Nausea And Vomiting  


 


Oxycodone/Acetaminophen  1 tab  07/25/22 16:54  07/29/22 10:58





  Oxycodone /Acetaminophen 5-325mg Tab  PO   1 tab





  Q6H PRN   Administration





  Pain, Moderate (4-6)  


 


Sodium Chloride  10 ml  07/25/22 22:00  07/31/22 10:38





  Sodium Chloride 0.9% 10 Ml Flush Syringe  IV   10 ml





  BID LYNDA   Administration


 


Sodium Chloride  10 ml  07/25/22 16:54 





  Sodium Chloride 0.9% 10 Ml Flush Syringe  IV  





  PRN PRN  





  LINE FLUSH

## 2022-08-01 LAB
ANISOCYTOSIS BLD QL SMEAR: (no result)
BAND NEUTROPHILS # (MANUAL): 0 K/MM3
BUN SERPL-MCNC: 19 MG/DL (ref 9–20)
BUN/CREAT SERPL: 13 %
CALCIUM SERPL-MCNC: 9.1 MG/DL (ref 8.4–10.2)
HCT VFR BLD CALC: 37.4 % (ref 35.5–45.6)
HEMOLYSIS INDEX: 2
HGB BLD-MCNC: 12.2 GM/DL (ref 11.8–15.2)
MCHC RBC AUTO-ENTMCNC: 33 % (ref 32–34)
MCV RBC AUTO: 92 FL (ref 84–94)
MYELOCYTES # (MANUAL): 0 K/MM3
PLATELET # BLD: 367 K/MM3 (ref 140–440)
PROMYELOCYTES # (MANUAL): 0 K/MM3
RBC # BLD AUTO: 4.09 M/MM3 (ref 3.65–5.03)
TOTAL CELLS COUNTED BLD: 100

## 2022-08-01 RX ADMIN — ASPIRIN SCH MG: 325 TABLET ORAL at 09:52

## 2022-08-01 RX ADMIN — INSULIN HUMAN SCH: 100 INJECTION, SOLUTION PARENTERAL at 12:10

## 2022-08-01 RX ADMIN — METOPROLOL TARTRATE SCH MG: 25 TABLET, FILM COATED ORAL at 09:52

## 2022-08-01 RX ADMIN — AMIODARONE HYDROCHLORIDE SCH MG: 200 TABLET ORAL at 08:23

## 2022-08-01 RX ADMIN — HEPARIN SODIUM SCH MLS/HR: 5000 INJECTION, SOLUTION INTRAVENOUS at 09:52

## 2022-08-01 RX ADMIN — HEPARIN SODIUM SCH MLS/HR: 5000 INJECTION, SOLUTION INTRAVENOUS at 21:41

## 2022-08-01 RX ADMIN — AMIODARONE HYDROCHLORIDE SCH MG: 200 TABLET ORAL at 21:40

## 2022-08-01 RX ADMIN — OXYCODONE AND ACETAMINOPHEN PRN TAB: 5; 325 TABLET ORAL at 21:40

## 2022-08-01 RX ADMIN — METOPROLOL TARTRATE SCH: 25 TABLET, FILM COATED ORAL at 18:34

## 2022-08-01 RX ADMIN — METOPROLOL TARTRATE SCH MG: 25 TABLET, FILM COATED ORAL at 01:45

## 2022-08-01 RX ADMIN — Medication SCH ML: at 09:52

## 2022-08-01 RX ADMIN — INSULIN HUMAN SCH: 100 INJECTION, SOLUTION PARENTERAL at 07:57

## 2022-08-01 RX ADMIN — INSULIN HUMAN SCH: 100 INJECTION, SOLUTION PARENTERAL at 18:33

## 2022-08-01 RX ADMIN — Medication SCH ML: at 21:41

## 2022-08-01 NOTE — PROGRESS NOTE
Assessment and Plan





- Patient Problems


(1) CARLEY (acute kidney injury)


Current Visit: Yes   Status: Acute   


Plan to address problem: 


Patient has not required any further dialysis at this time and renal function 

continues to improve.  He has excellent urine output at this time.  We will plan

to have the dialysis nurses remove his Vas-Cath today.








(2) Acute on chronic HFrEF (heart failure with reduced ejection fraction)


Current Visit: Yes   Status: Acute   


Plan to address problem: 


Cardiology recommendations reviewed.  Diuretics as recommended.  Counseled on 

importance of fluid and sodium restrictions.  Currently patient seems euvolemic 

on examination.  Overall respiratory status is stable.  We will continue to 

monitor closely.








(3) Hyperkalemia


Current Visit: Yes   Status: Acute   


Plan to address problem: 


Potassium levels have improved with dialysis.  We will continue to monitor 

closely.  Counseled patient on the importance of a low potassium diet in 

general.








(4) Hyponatremia


Current Visit: Yes   Status: Acute   


Plan to address problem: 


Likely in the setting of volume overload.  Improvement since admission.








(5) Diabetes mellitus


Current Visit: Yes   Status: Chronic   


Plan to address problem: 


Diabetes management per primary attending.








Subjective


Date of service: 08/01/22


Principal diagnosis: CARLEY, Acute on chronic HFrEF, OPAD, NICMP, Permanent AF with

RVR


Interval history: 





No acute issues overnight and renal function continues to show improvement at 

this time.  I have discussed with the dialysis team and we will plan to remove 

Vas-Cath today.





Objective





- Vital Signs


Vital signs: 


                               Vital Signs - 12hr











  08/01/22 08/01/22 08/01/22





  00:14 00:58 04:19


 


Temperature 98.1 F  98.0 F


 


Pulse Rate 73 93 H 91 H


 


Respiratory 18  18





Rate   


 


Blood Pressure 142/96  147/94


 


O2 Sat by Pulse 97  99





Oximetry   














  08/01/22





  04:24


 


Temperature 97.8 F


 


Pulse Rate 72


 


Respiratory 22





Rate 


 


Blood Pressure 116/67


 


O2 Sat by Pulse 99





Oximetry 














- General Appearance


General appearance: well-developed, obese


EENT: ATNC


Neck: no JVD


Respiratory: Present: Clear to Ascultation


Cardiology: regular


Gastrointestinal: normal


Integumentary: no rash


Neurologic: no focal deficit


Musculoskeletal: deferred


Psychiatric: cooperative





- Lab





                                 08/01/22 03:56





                                 08/01/22 03:56


                             Most recent lab results











Calcium  9.1 mg/dL (8.4-10.2)   08/01/22  03:56    


 


Phosphorus  3.60 mg/dL (2.5-4.5)   07/29/22  05:50    


 


Magnesium  1.80 mg/dL (1.7-2.3)   07/29/22  05:50    


 


Urine Creatinine  62.6 mg/dL (0.1-20.0)  H  07/28/22  07:10    


 


Urine Sodium  26 mmol/L  07/28/22  07:10    


 


Urine Total Protein  15 mg/dL (5-11.8)  H  07/28/22  07:10    














- Allied health notes


Allied health notes reviewed: nursing





Medications & Allergies





- Medications


Allergies/Adverse Reactions: 


                                    Allergies





adhesive Allergy (Verified 07/25/22 14:07)


   Rash


ibuprofen [From Motrin] Allergy (Verified 07/25/22 14:07)


   Swelling


lisinopril Allergy (Verified 07/25/22 14:07)


   COUGH


Iodinated Contrast Media [Iodinated Contrast Media - IV Dye] Adverse Reaction 

(Verified 07/25/22 14:07)


   KIDNEYS SHUT DOWN








Home Medications: 


                                Home Medications











 Medication  Instructions  Recorded  Confirmed  Last Taken  Type


 


Digoxin [Lanoxin] 0.25 mg PO DAILY@1700 #30 tablet 01/05/15 08/01/22 07/25/22 

10:00 Rx


 


Aspirin 325 mg PO QDAY 06/27/17 08/01/22 07/25/22 10:00 History





    325 


 


Furosemide [Lasix TAB] 40 mg PO QDAY 06/27/17 08/01/22 07/25/22 History


 


Losartan [Cozaar] 50 mg PO QDAY 06/27/17 08/01/22 07/25/22 10:00 History


 


HYDROcodone/APAP 5-325 [[place] 1 each PO Q6HR PRN #20 tablet 02/27/20 08/01/22 07/25/22 10:00 Rx





5/325]    1 


 


Penicillin Vk [Veetids TAB] 500 mg PO QID #21 tablet 02/27/20 08/01/22 07/25/22 

10:00 Rx


 


Acetaminophen [Acetaminophen TAB] 1,000 mg PO Q6HR #30 tablet 05/27/20 08/01/22 07/25/22 10:00 Rx





    1000 


 


cephALEXin [Keflex] 500 mg PO Q8HR #30 cap 05/27/20 08/01/22 07/24/22 10:00 Rx











Active Medications: 














Generic Name Dose Route Start Last Admin





  Trade Name Freq  PRN Reason Stop Dose Admin


 


Acetaminophen  650 mg  07/25/22 16:54  07/27/22 09:52





  Acetaminophen 325 Mg Tab  PO   650 mg





  Q4H PRN   Administration





  Pain MILD(1-3)/Fever >100.5/HA  


 


Amiodarone HCl  400 mg  07/30/22 17:00  08/01/22 08:23





  Amiodarone 200 Mg Tab  PO   400 mg





  TID LYNDA   Administration


 


Aspirin  325 mg  07/26/22 10:00  07/31/22 10:38





  Aspirin 325 Mg Tab  PO   325 mg





  QDAY LYNDA   Administration


 


Dextrose  50 ml  07/26/22 10:45 





  Dextrose 50% In Water (25gm) 50 Ml Syringe  IV  





  Q30MIN PRN  





  Hypoglycemia  





  Protocol  


 


Glucagon  1 mg  07/25/22 15:39  07/25/22 16:08





  Glucagon (Human Recombinant) 1 Mg/Ml Inj  IV   1 mg





  Q1H PRN   Administration





  Hypoglycemia  


 


Sodium Chloride  100 mls @ 999 mls/hr  07/26/22 07:08 





  Nacl 0.9%  IV  





  CASSIDY PRN  





  Hypotension  


 


Heparin Sodium/Sodium Chloride  25,000 unit in 500 mls @ 30 mls/hr  07/27/22 

12:00  08/01/22 02:10





  Heparin/ 0.45% Nacl-25,000 Unit/500 Ml  IV   2,300 units/hr





  TITR LYNDA   46 mls/hr





    Titration





  Protocol  





  1,500 UNITS/HR  


 


Insulin Human Regular  0 units  07/29/22 16:30  08/01/22 07:57





  Insulin Regular, Human 100 Units/1 Ml  SUB-Q   Not Given





  [facility]  





  Protocol  


 


Metoprolol Tartrate  50 mg  07/31/22 17:00  08/01/22 01:45





  Metoprolol Tartrate 25 Mg Tab  PO   50 mg





  Q8H LYNDA   Administration


 


Morphine Sulfate  2 mg  07/25/22 16:54  07/27/22 13:48





  Morphine 2 Mg/1 Ml Inj  IV   2 mg





  Q4H PRN   Administration





  Pain, Moderate (4-6)  


 


Ondansetron HCl  4 mg  07/27/22 12:00  07/27/22 13:48





  Ondansetron 4 Mg/2 Ml Inj  IV   4 mg





  Q6H PRN   Administration





  Nausea And Vomiting  


 


Oxycodone/Acetaminophen  1 tab  07/25/22 16:54  07/31/22 22:03





  Oxycodone /Acetaminophen 5-325mg Tab  PO   1 tab





  Q6H PRN   Administration





  Pain, Moderate (4-6)  


 


Sodium Chloride  10 ml  07/25/22 22:00  07/31/22 22:45





  Sodium Chloride 0.9% 10 Ml Flush Syringe  IV   10 ml





  BID LYNDA   Administration


 


Sodium Chloride  10 ml  07/25/22 16:54 





  Sodium Chloride 0.9% 10 Ml Flush Syringe  IV  





  PRN PRN  





  LINE FLUSH

## 2022-08-01 NOTE — PROGRESS NOTE
Assessment and Plan


Assessment and plan: 





This is a 42-year-old male with HTN, right-sided CVA with residual left-sided 

hemiplegia and bedridden's, HFrEF, MI s/p stent placement, DM, asthma, COPD, 

current nicotine abuse, A. fib and noncompliance with A. fib with RVR, acute 

kidney injury with hypoglycemia and severe hyperkalemia. 








#CARLEY secondary to vasomotor nephropathyimproving


 Creatinine 1.7 (previously 2.9)-->1.5


 Renally dose medications and avoid nephrotoxic drugs.  Currently holding ACE 

inhibitors and ARB's.


 Nephrology consulted; appreciate recs


 Continue to monitor





#Peripheral arterial disease


#Dry gangrene of right great toe


Vascular surgery consulted; appreciate recs


 Pending angiogram and revascularization once patient's renal status has 

returned back to baseline.  Possibly as early as 8/3/2022.


 Continue heparin drip





#Atrial fibrillationstable


#History of MI status post stent placement


#Nonischemic cardiomyopathy


 Continue amiodarone 400 mg twice daily, heparin drip, and metoprolol tartrate 

25 mg every 6 hours


 Cardiology consulted; appreciate recs


 Continue to monitor





#Leukocytosis


 Continue to monitor.





#History of right sided CVA with residual left-sided hemiplegia


#Bedridden


 Continue goal-directed therapy





#COPD


 Continue DuoNebs and albuterol nebs as needed





#Mild protein caloric malnutrition


 Continue dietary supplementation





#Morbid obesity


#Weight loss counseling


#Exercise counseling


- BMI 41.4


- Counseled patient on the importance of weight loss, incorporating exercise, 

and dietary changes (lean meats, fresh fruits and vegetables, and water intake).

Patient expresses understanding. 


- Time: +15 min





#Tobacco dependence


#Tobacco/Smoking cessation counseling


- Counseled patient about the importance of smoking cessation and the possible 

sequelae as a result of continued tobacco consumption. The patient expresses 

understanding. 


-Time: +15 mins








Resolved diagnoses:


Hyponatremia


Hypochloremia


SIRS


Hypoglycemia


Acute metabolic encephalopathy


Acute on chronic systolic heart failure


Sepsis





#Advanced care planning


-Disease education conducted, care plan discussed, diagnoses discussed, 

prognosis discussed, and patient acknowledges understanding with care plan


-Time: +30 min








History


Interval history: 


This is a 42-year-old male with HTN, right CVA (1/2021) with residual left-sided

hemiplegia and bedridden, heart failure with reduced EF (EF 20 to 25% in 2013), 

MI in 2016, s/p stent placement in 2017, DM, COPD, current nicotine abuse, A. 

fib and noncompliance who presented to the emergency department on 7/25 via EMS 

for generalized weakness and low blood glucose.  Per EMS patient's blood glucose

levels were in the 40s and he complained of inability to urinate for 2 days and 

shortness of breath.  Patient was admitted to [facility] in January 2021 

and is status BUN/creatinine on 5/27/2020 was noted to be 18/1.0.  Patient 

presented to emergency department with fever of 99, , /90, lab work 

showed leukocytosis, hyponatremia at 128, hyperkalemia at 7.2, metabolic 

acidosis of 17, elevated BUN/creatinine at 107/15.7 and hyperglycemia 54.  

Patient was medically treated with calcium gluconate and bicarbonate, received 2

L normal saline bolus and it was noted that patient refused Zamarripa catheter 

placement.  Nephrology was consulted in the emergency department.  Patient was 

admitted to the hospitalist service with hypertensive emergency, hypoglycemia, 

hyperkalemia, acute kidney injury, A. fib RVR and CHF with consults to [facility] and 

cardiology.





Hospital course to date:





7/26: Vas-Cath placed for emergent hemodialysis for which she received.  RN 

asked to BladderScan the patient for possible placement of Zamarripa catheter.  

Renal ultrasound pending.  Sodium bicarbonate drip was discontinued due to 

bicarbonate improvement into the 20s and D10 was decreased to 75.  Digoxin 

discontinued.  Neurology consulted for guidance for anticoagulation as patient 

was initially not anticoagulated for risk of hemorrhagic conversion of CVA.





7/27: Cardio will start amio as documented allergy is not a true allergy. HD 

again today. DC hydral given and decrease amlodipine re soft BPs. Bladder scan 

revealed no urine. CCM will like to continue D10 but decrease rate and will 

start heparin gtt given afib (was on home eliquis) given possible need for 

vascath. 





7/28: Cardiology started patient on p.o. amiodarone to be given along with 

amiodarone drip, hemodialysis today.  Patient started having urine output.  

Vascular surgery consulted for great toe.  Patient will be transferred to [facility].





7/29: No acute vents reported overnight, occlusion of right posterior tib and DP

artery on vascular ultrasound.  Continue on amiodarone drip and p.o.  Patient 

started on metoprolol also.





7/30: Patient has been transferred to the floor.  Patient having clinical 

improvement with creatinine decreasing from 4.0 to 2.9.  Vascular surgery 

documented the following: "The patient had an arterial duplex that demonstrated 

evidence of aortoiliac occlusive disease as well as tibial disease in his right 

lower extremity.  He is in need of a diagnostic angiogram with possible 

intervention to improve flow to his leg and heel his right first toe wounds. We 

will continue to follow and observe his renal function.  Once he is back to his 

baseline we will discuss with nephrology the potential for the angiogram."  





7/31: Patient continued to have improvement of renal function with creatinine of

1.7.  Continue with medical management.





8/1: Patient continues have improvement in renal function with creatinine of 

1.5.  Continue medical management.  Possible revascularization of right lower 

extremity by vascular surgery on 8/3/2022.








Disposition Plan: Continue medical management


Total Time Spent with Patient (Minutes): 45 minutes





History


Interval history: 





No acute events overnight.





Hospitalist Physical





- Constitutional


Vitals: 


                                        











Temp Pulse Resp BP Pulse Ox


 


 97.8 F   72   22   116/67   97 


 


 08/01/22 04:24  08/01/22 04:24  08/01/22 04:24  08/01/22 04:24 08/01/22 10:00











General appearance: Present: no acute distress, well-nourished, obese, other 

(Bedridden)





- EENT


Eyes: Present: PERRL, EOM intact


ENT: hearing intact, clear oral mucosa, dentition normal





- Neck


Neck: Present: supple, normal ROM





- Respiratory


Respiratory effort: normal


Respiratory: bilateral: CTA





- Cardiovascular


Rhythm: regular


Heart Sounds: Present: S1 & S2





- Extremities


Extremities: no ischemia, pulses intact, pulses symmetrical, No edema, normal 

temperature, normal color


Extremity abnormal: ulceration (Dry gangrene of right great toe)


Peripheral Pulses: within normal limits





- Abdominal


General gastrointestinal: soft, non-tender, non-distended, normal bowel sounds





- Integumentary


Integumentary: Present: clear, warm, dry





- Psychiatric


Psychiatric: appropriate mood/affect, intact judgment & insight, memory intact, 

cooperative





- Neurologic


Neurologic: CNII-XII intact





- Allied Health


Allied health notes reviewed: nursing





HEART Score





- HEART Score


Age: 45-65


Risk factors: > 3 risk factors or hx of atherosclerotic disease


Troponin: 1-3x normal limit





- Critical Actions


Critical Actions: 4-6 pts:12-16.6% risk of adverse cardiac event. Should be 

admitted





Results





- Labs


CBC & Chem 7: 


                                 08/01/22 03:56





                                 08/01/22 03:56


Labs: 


                             Laboratory Last Values











WBC  14.2 K/mm3 (4.5-11.0)  H  08/01/22  03:56    


 


RBC  4.09 M/mm3 (3.65-5.03)   08/01/22  03:56    


 


Hgb  12.2 gm/dl (11.8-15.2)   08/01/22  03:56    


 


Hct  37.4 % (35.5-45.6)   08/01/22  03:56    


 


MCV  92 fl (84-94)   08/01/22  03:56    


 


MCH  30 pg (28-32)   08/01/22  03:56    


 


MCHC  33 % (32-34)   08/01/22  03:56    


 


RDW  14.5 % (13.2-15.2)   08/01/22  03:56    


 


Plt Count  367 K/mm3 (140-440)   08/01/22  03:56    


 


Add Manual Diff  Complete   08/01/22  03:56    


 


Total Counted  100   08/01/22  03:56    


 


Seg Neuts % (Manual)  81.0 % (40.0-70.0)  H  08/01/22  03:56    


 


Band Neutrophils %  0 %  08/01/22  03:56    


 


Lymphocytes % (Manual)  11.0 % (13.4-35.0)  L  08/01/22  03:56    


 


Reactive Lymphs % (Man)  0 %  08/01/22  03:56    


 


Monocytes % (Manual)  5.0 % (0.0-7.3)   08/01/22  03:56    


 


Eosinophils % (Manual)  3.0 % (0.0-4.3)   08/01/22  03:56    


 


Basophils % (Manual)  0 % (0.0-1.8)   08/01/22  03:56    


 


Metamyelocytes %  0 %  08/01/22  03:56    


 


Myelocytes %  0 %  08/01/22  03:56    


 


Promyelocytes %  0 %  08/01/22  03:56    


 


Blast Cells %  0 %  08/01/22  03:56    


 


Nucleated RBC %  Not Reportable   08/01/22  03:56    


 


Seg Neutrophils # Man  11.5 K/mm3 (1.8-7.7)  H  08/01/22  03:56    


 


Band Neutrophils #  0.0 K/mm3  08/01/22  03:56    


 


Lymphocytes # (Manual)  1.6 K/mm3 (1.2-5.4)   08/01/22  03:56    


 


Abs React Lymphs (Man)  0.0 K/mm3  08/01/22  03:56    


 


Monocytes # (Manual)  0.7 K/mm3 (0.0-0.8)   08/01/22  03:56    


 


Eosinophils # (Manual)  0.4 K/mm3 (0.0-0.4)   08/01/22  03:56    


 


Basophils # (Manual)  0.0 K/mm3 (0.0-0.1)   08/01/22  03:56    


 


Metamyelocytes #  0.0 K/mm3  08/01/22  03:56    


 


Myelocytes #  0.0 K/mm3  08/01/22  03:56    


 


Promyelocytes #  0.0 K/mm3  08/01/22  03:56    


 


Blast Cells #  0.0 K/mm3  08/01/22  03:56    


 


WBC Morphology  Not Reportable   08/01/22  03:56    


 


Hypersegmented Neuts  Not Reportable   08/01/22  03:56    


 


Hyposegmented Neuts  Not Reportable   08/01/22  03:56    


 


Hypogranular Neuts  Not Reportable   08/01/22  03:56    


 


Smudge Cells  Not Reportable   08/01/22  03:56    


 


Toxic Granulation  Not Reportable   08/01/22  03:56    


 


Toxic Vacuolation  Not Reportable   08/01/22  03:56    


 


Dohle Bodies  Not Reportable   08/01/22  03:56    


 


Pelger-Huet Anomaly  Not Reportable   08/01/22  03:56    


 


Cb Rods  Not Reportable   08/01/22  03:56    


 


Platelet Estimate  Consistent w auto   08/01/22  03:56    


 


Clumped Platelets  Not Reportable   08/01/22  03:56    


 


Plt Clumps, EDTA  Not Reportable   08/01/22  03:56    


 


Large Platelets  Not Reportable   08/01/22  03:56    


 


Giant Platelets  Not Reportable   08/01/22  03:56    


 


Platelet Satelliting  Not Reportable   08/01/22  03:56    


 


Plt Morphology Comment  Not Reportable   08/01/22  03:56    


 


RBC Morphology  Not Reportable   08/01/22  03:56    


 


Dimorphic RBCs  Not Reportable   08/01/22  03:56    


 


Polychromasia  Not Reportable   08/01/22  03:56    


 


Hypochromasia  Not Reportable   08/01/22  03:56    


 


Poikilocytosis  Not Reportable   08/01/22  03:56    


 


Anisocytosis  1+   08/01/22  03:56    


 


Microcytosis  Not Reportable   08/01/22  03:56    


 


Macrocytosis  Not Reportable   08/01/22  03:56    


 


Spherocytes  Not Reportable   08/01/22  03:56    


 


Pappenheimer Bodies  Not Reportable   08/01/22  03:56    


 


Sickle Cells  Not Reportable   08/01/22  03:56    


 


Target Cells  Not Reportable   08/01/22  03:56    


 


Tear Drop Cells  Not Reportable   08/01/22  03:56    


 


Ovalocytes  Not Reportable   08/01/22  03:56    


 


Helmet Cells  Not Reportable   08/01/22  03:56    


 


[place] Bodies  Not Reportable   08/01/22  03:56    


 


Cabot Rings  Not Reportable   08/01/22  03:56    


 


Mellette Cells  Not Reportable   08/01/22  03:56    


 


Bite Cells  Not Reportable   08/01/22  03:56    


 


Crenated Cell  Not Reportable   08/01/22  03:56    


 


Elliptocytes  Not Reportable   08/01/22  03:56    


 


Acanthocytes (Spur)  Not Reportable   08/01/22  03:56    


 


Rouleaux  Not Reportable   08/01/22  03:56    


 


Hemoglobin C Crystals  Not Reportable   08/01/22  03:56    


 


Schistocytes  Not Reportable   08/01/22  03:56    


 


Malaria parasites  Not Reportable   08/01/22  03:56    


 


Justin Bodies  Not Reportable   08/01/22  03:56    


 


Hem Pathologist Commnt  No   08/01/22  03:56    


 


PT  15.8 Sec. (12.2-14.9)  H  07/27/22  12:54    


 


INR  1.13  (0.87-1.13)   07/27/22  12:54    


 


APTT  33.5 Sec. (24.2-36.6)   07/27/22  12:54    


 


Heparin Anti-Xa Level  0.29 U.I./ml (0.3-0.7)  L  08/01/22  02:02    


 


Sodium  139 mmol/L (137-145)   08/01/22  03:56    


 


Potassium  3.8 mmol/L (3.6-5.0)   08/01/22  03:56    


 


Chloride  103.5 mmol/L ()   08/01/22  03:56    


 


Carbon Dioxide  26 mmol/L (22-30)   08/01/22  03:56    


 


Anion Gap  13 mmol/L  08/01/22  03:56    


 


BUN  19 mg/dL (9-20)   08/01/22  03:56    


 


Creatinine  1.5 mg/dL (0.8-1.3)  H  08/01/22  03:56    


 


Estimated GFR  > 60 ml/min  08/01/22  03:56    


 


BUN/Creatinine Ratio  13 %  08/01/22  03:56    


 


Glucose  131 mg/dL ()  H  08/01/22  03:56    


 


POC Glucose  132 mg/dL ()  H  08/01/22  11:59    


 


Calcium  9.1 mg/dL (8.4-10.2)   08/01/22  03:56    


 


Phosphorus  3.60 mg/dL (2.5-4.5)   07/29/22  05:50    


 


Magnesium  1.80 mg/dL (1.7-2.3)   07/29/22  05:50    


 


Total Bilirubin  0.70 mg/dL (0.1-1.2)   07/27/22  04:00    


 


AST  65 units/L (5-40)  H  07/27/22  04:00    


 


ALT  36 units/L (7-56)   07/27/22  04:00    


 


Alkaline Phosphatase  129 units/L ()   07/27/22  04:00    


 


NT-Pro-B Natriuret Pep  20028 pg/mL (0-450)  H  07/26/22  04:16    


 


Total Protein  6.5 g/dL (6.3-8.2)   07/27/22  04:00    


 


Albumin  2.6 g/dL (3.9-5)  L  07/27/22  04:00    


 


Albumin/Globulin Ratio  0.7 %  07/27/22  04:00    


 


Urine Osmolality  212 Mosm/kg  07/28/22  11:46    


 


Urine Creatinine  62.6 mg/dL (0.1-20.0)  H  07/28/22  07:10    


 


Urine Sodium  26 mmol/L  07/28/22  07:10    


 


Urine Total Protein  15 mg/dL (5-11.8)  H  07/28/22  07:10    


 


Coronavirus (PCR)  Negative  (Negative)   07/30/22  11:58    


 


Hepatitis A IgM Ab  Non-reactive  (NonReactive)   07/26/22  11:10    


 


Hep Bs Antigen  Non-reactive  (Negative)   07/26/22  11:10    


 


Hep B Core IgM Ab  Non-reactive  (NonReactive)   07/26/22  11:10    


 


Hepatitis C Antibody  Non-reactive  (NonReactive)   07/26/22  11:10    











Microbiology: 


Microbiology





07/26/22 18:00   Peripheral/Venous   Blood Culture - Final


                            NO GROWTH AFTER 5 DAYS


07/26/22 18:00   Peripheral/Venous   Blood Culture - Final


                            NO GROWTH AFTER 5 DAYS








Zamarripa/IV: 


                                        





Voiding Method                   Toilet











Active Medications





- Current Medications


Current Medications: 














Generic Name Dose Route Start Last Admin





  Trade Name Freq  PRN Reason Stop Dose Admin


 


Acetaminophen  650 mg  07/25/22 16:54  07/27/22 09:52





  Acetaminophen 325 Mg Tab  PO   650 mg





  Q4H PRN   Administration





  Pain MILD(1-3)/Fever >100.5/HA  


 


Amiodarone HCl  200 mg  08/01/22 22:00 





  Amiodarone 200 Mg Tab  PO  





  BID LYNDA  


 


Aspirin  325 mg  07/26/22 10:00  08/01/22 09:52





  Aspirin 325 Mg Tab  PO   325 mg





  QDAY LYNDA   Administration


 


Dextrose  50 ml  07/26/22 10:45 





  Dextrose 50% In Water (25gm) 50 Ml Syringe  IV  





  Q30MIN PRN  





  Hypoglycemia  





  Protocol  


 


Glucagon  1 mg  07/25/22 15:39  07/25/22 16:08





  Glucagon (Human Recombinant) 1 Mg/Ml Inj  IV   1 mg





  Q1H PRN   Administration





  Hypoglycemia  


 


Sodium Chloride  100 mls @ 999 mls/hr  07/26/22 07:08 





  Nacl 0.9%  IV  





  CASSIDY PRN  





  Hypotension  


 


Heparin Sodium/Sodium Chloride  25,000 unit in 500 mls @ 30 mls/hr  07/27/22 

12:00  08/01/22 09:52





  Heparin/ 0.45% Nacl-25,000 Unit/500 Ml  IV   2,300 units/hr





  TITR LYNDA   46 mls/hr





    Administration





  Protocol  





  1,500 UNITS/HR  


 


Insulin Human Regular  0 units  07/29/22 16:30  08/01/22 12:10





  Insulin Regular, Human 100 Units/1 Ml  SUB-Q   Not Given





  [facility]  





  Protocol  


 


Metoprolol Tartrate  50 mg  07/31/22 17:00  08/01/22 09:52





  Metoprolol Tartrate 25 Mg Tab  PO   50 mg





  Q8H LYNDA   Administration


 


Morphine Sulfate  2 mg  07/25/22 16:54  07/27/22 13:48





  Morphine 2 Mg/1 Ml Inj  IV   2 mg





  Q4H PRN   Administration





  Pain, Moderate (4-6)  


 


Ondansetron HCl  4 mg  07/27/22 12:00  07/27/22 13:48





  Ondansetron 4 Mg/2 Ml Inj  IV   4 mg





  Q6H PRN   Administration





  Nausea And Vomiting  


 


Oxycodone/Acetaminophen  1 tab  07/25/22 16:54  07/31/22 22:03





  Oxycodone /Acetaminophen 5-325mg Tab  PO   1 tab





  Q6H PRN   Administration





  Pain, Moderate (4-6)  


 


Sodium Chloride  10 ml  07/25/22 22:00  08/01/22 09:52





  Sodium Chloride 0.9% 10 Ml Flush Syringe  IV   10 ml





  BID LYNDA   Administration


 


Sodium Chloride  10 ml  07/25/22 16:54 





  Sodium Chloride 0.9% 10 Ml Flush Syringe  IV  





  PRN PRN  





  LINE FLUSH

## 2022-08-01 NOTE — PROGRESS NOTE
Assessment and Plan





Patient is a 42-year-old male with a past medical history of HFrEF, nonischemic 

cardiomyopathy, chronic A. fib, hypertension, COPD, diabetes, history of CVA in 

January 2021 with left-sided hemiplegia and bedridden who came to the ED for 

complaint of weakness and hypoglycemia





Acute renal failure-nephrology following


Altered mental status


Acute on chronic heart failure


Hyperkalemia


Hyponatremia


Hypoglycemia


Hypertension


Cardiomyopathy


Diabetes


History of CVA


A. fib





Echo 11/8/2020-the study was technically difficult in quality.  Arrhythmia was 

noted during the study LVEF is moderately decreased 35-40%. Regional wall motion

abnormalities noted RV moderately dilated. RV systolic function is moderately 

reduced. RVSP is mildly elevated No pericardial effusion No definite or 

significant change in the EF from 10/15/2018 Consider an infiltrative 

cardiomyopathy such as amyloid


Echo 7/26/2022-EF 20 to 25% moderate concentric LVH.  Right ventricle is 

dilated.  Right ventricle hypokinetic.  Left atrium is severely dilated.  Right 

atrium dilated


Per documentation outpatient medications: Losartan 50 mg p.o. daily Lasix 40 mg 

p.o. daily, digoxin 0.25 mg p.o. daily, asa





Plan:





Telemetry reviewed patient is in A. fib rate 90s to low 100


Decreased to amiodarone 200 mg p.o. twice daily


Continue metoprolol 50 mg p.o. every 8 hourly.


Patient currently anticoagulated on heparin.  Recommend transitioning to Eliquis

however will wait until cleared by vascular


Will defer to nephrology recommendations for volume management


No ACE/ARB due to renal function and documented allergy


Discussed plan of care with patient who verbalized understanding and 

acknowledgment





Patient seen in conjunction with Dr. Reid who agrees with plan of care











- Patient Problems


(1) CARLEY (acute kidney injury)


Current Visit: Yes   Status: Acute   





(2) Hyperkalemia


Current Visit: Yes   Status: Acute   





(3) Hypoglycemia


Current Visit: Yes   Status: Acute   





(4) Hyponatremia


Current Visit: Yes   Status: Acute   





(5) Acute on chronic systolic CHF (congestive heart failure)


Current Visit: No   Status: Acute   





(6) Atrial fibrillation with RVR


Current Visit: No   Status: Acute   





(7) COPD (chronic obstructive pulmonary disease)


Current Visit: Yes   Status: Chronic   





(8) History of noncompliance with medical treatment


Current Visit: No   Status: Acute   





(9) Obesity


Current Visit: No   Status: Acute   





(10) Cardiomyopathy


Current Visit: Yes   Status: Chronic   





Subjective


Date of service: 08/01/22


Principal diagnosis: CARLEY, Acute on chronic HFrEF, OPAD, NICMP, Permanent AF with

RVR


Interval history: 





Patient resting in bed in no acute distress. 


A. fib rates 90s to low 100s





Objective


                                   Vital Signs











  Temp Pulse Resp Resp BP Pulse Ox


 


 08/01/22 04:24  97.8 F  72  22   116/67  99


 


 08/01/22 04:19  98.0 F  91 H  18   147/94  99


 


 08/01/22 00:58   93 H    


 


 08/01/22 00:14  98.1 F  73  18   142/96  97


 


 07/31/22 20:15     20   94


 


 07/31/22 19:49  97.9 F  109 H  20   142/90  97


 


 07/31/22 15:29  98.3 F  99 H    147/110  99


 


 07/31/22 12:19   98 H    


 


 07/31/22 12:18  98.0 F  96 H    138/106  100


 


 07/31/22 12:00       97














- Physical Examination


General: No Apparent Distress


HEENT: Positive: EOMI, Normocephaly, Mucus Membranes Moist


Neck: Positive: neck supple, trachea midline


Cardiac: Positive: irregularly irregular


Lungs: Positive: Normal Breath Sounds


Neuro: Positive: Grossly Intact


Abdomen: Positive: Soft, Active Bowel Sounds.  Negative: Tender


Skin: Positive: Other (Right great toe gangrene)


Musculoskeletal: Normal Range of Motion


Extremities: Present: edema (Trace pitting bilateral leg edema)





- Labs and Meds


                                       CBC











  08/01/22 Range/Units





  03:56 


 


WBC  14.2 H  (4.5-11.0)  K/mm3


 


RBC  4.09  (3.65-5.03)  M/mm3


 


Hgb  12.2  (11.8-15.2)  gm/dl


 


Hct  37.4  (35.5-45.6)  %


 


Plt Count  367  (140-440)  K/mm3








                          Comprehensive Metabolic Panel











  08/01/22 Range/Units





  03:56 


 


Sodium  139  (137-145)  mmol/L


 


Potassium  3.8  (3.6-5.0)  mmol/L


 


Chloride  103.5  ()  mmol/L


 


Carbon Dioxide  26  (22-30)  mmol/L


 


BUN  19  (9-20)  mg/dL


 


Creatinine  1.5 H  (0.8-1.3)  mg/dL


 


Glucose  131 H  ()  mg/dL


 


Calcium  9.1  (8.4-10.2)  mg/dL














- Imaging and Cardiology


EKG: report reviewed (Atrial fibrillation with heart rate of 120)


Echo: report reviewed





- Telemetry


EKG Rhythm: Atrial Fibrillation





- EKG


Supraventricular dysrhythmia: atrial fibrillation





- Allied health notes


Allied health notes reviewed: nursing

## 2022-08-01 NOTE — PROGRESS NOTE
Assessment and Plan





Patient continues to improve from a renal point of view.  Creatinine today 1.5. 

Would anticipate patient may be able to undergo interventional procedure on 

Wednesday if this trend continues.  





Subjective


Date of service: 08/01/22


Principal diagnosis: CARLEY, Acute on chronic HFrEF, OPAD, NICMP, Permanent AF with

RVR


Interval history: 





Patient with a history of severe peripheral vascular disease and right first toe

gangrene as well as acute renal failure.  Patient resting comfortably on 

examination.  Nonpalpable right pedal pulses.  Right first toe painted with 

Betadine.





Objective





- Constitutional


Vitals: 


                               Vital Signs - 12hr











  08/01/22 08/01/22 08/01/22





  00:58 04:19 04:24


 


Temperature  98.0 F 97.8 F


 


Pulse Rate 93 H 91 H 72


 


Respiratory  18 22





Rate   


 


Blood Pressure  147/94 116/67


 


O2 Sat by Pulse  99 99





Oximetry   














  08/01/22





  10:00


 


Temperature 


 


Pulse Rate 


 


Respiratory 





Rate 


 


Blood Pressure 


 


O2 Sat by Pulse 97





Oximetry 











General appearance: Present: no acute distress





- EENT


Eyes: EOM intact


ENT: hearing intact





- Neck


Neck: supple, normal ROM





- Respiratory


Respiratory effort: normal


Extremities: abnormal





- Gastrointestinal


General gastrointestinal: Present: deferred


Rectal Exam: deferred





- Psychiatric


Psychiatric: appropriate mood/affect, cooperative





- Labs


CBC & Chem 7: 


                                 08/01/22 03:56





                                 08/01/22 03:56


Labs: 


                              Abnormal lab results











  07/31/22 07/31/22 08/01/22 Range/Units





  15:39 20:42 02:02 


 


WBC     (4.5-11.0)  K/mm3


 


Seg Neuts % (Manual)     (40.0-70.0)  %


 


Lymphocytes % (Manual)     (13.4-35.0)  %


 


Seg Neutrophils # Man     (1.8-7.7)  K/mm3


 


Heparin Anti-Xa Level    0.29 L  (0.3-0.7)  U.I./ml


 


Creatinine     (0.8-1.3)  mg/dL


 


Glucose     ()  mg/dL


 


POC Glucose  149 H  143 H   ()  mg/dL














  08/01/22 08/01/22 08/01/22 Range/Units





  03:56 03:56 07:49 


 


WBC  14.2 H    (4.5-11.0)  K/mm3


 


Seg Neuts % (Manual)  81.0 H    (40.0-70.0)  %


 


Lymphocytes % (Manual)  11.0 L    (13.4-35.0)  %


 


Seg Neutrophils # Man  11.5 H    (1.8-7.7)  K/mm3


 


Heparin Anti-Xa Level     (0.3-0.7)  U.I./ml


 


Creatinine   1.5 H   (0.8-1.3)  mg/dL


 


Glucose   131 H   ()  mg/dL


 


POC Glucose    128 H  ()  mg/dL














  08/01/22 Range/Units





  11:59 


 


WBC   (4.5-11.0)  K/mm3


 


Seg Neuts % (Manual)   (40.0-70.0)  %


 


Lymphocytes % (Manual)   (13.4-35.0)  %


 


Seg Neutrophils # Man   (1.8-7.7)  K/mm3


 


Heparin Anti-Xa Level   (0.3-0.7)  U.I./ml


 


Creatinine   (0.8-1.3)  mg/dL


 


Glucose   ()  mg/dL


 


POC Glucose  132 H  ()  mg/dL














Medications & Allergies





- Medications


Allergies/Adverse Reactions: 


                                    Allergies





adhesive Allergy (Verified 07/25/22 14:07)


   Rash


ibuprofen [From Motrin] Allergy (Verified 07/25/22 14:07)


   Swelling


lisinopril Allergy (Verified 07/25/22 14:07)


   COUGH


Iodinated Contrast Media [Iodinated Contrast Media - IV Dye] Adverse Reaction 

(Verified 07/25/22 14:07)


   KIDNEYS SHUT DOWN








Home Medications: 


                                Home Medications











 Medication  Instructions  Recorded  Confirmed  Last Taken  Type


 


Digoxin [Lanoxin] 0.25 mg PO DAILY@1700 #30 tablet 01/05/15 08/01/22 07/25/22 

10:00 Rx


 


Aspirin 325 mg PO QDAY 06/27/17 08/01/22 07/25/22 10:00 History





    325 


 


Furosemide [Lasix TAB] 40 mg PO QDAY 06/27/17 08/01/22 07/25/22 History


 


Losartan [Cozaar] 50 mg PO QDAY 06/27/17 08/01/22 07/25/22 10:00 History


 


HYDROcodone/APAP 5-325 [[place] 1 each PO Q6HR PRN #20 tablet 02/27/20 08/01/22 07/25/22 10:00 Rx





5/325]    1 


 


Penicillin Vk [Veetids TAB] 500 mg PO QID #21 tablet 02/27/20 08/01/22 07/25/22 

10:00 Rx


 


Acetaminophen [Acetaminophen TAB] 1,000 mg PO Q6HR #30 tablet 05/27/20 08/01/22 07/25/22 10:00 Rx





    1000 


 


cephALEXin [Keflex] 500 mg PO Q8HR #30 cap 05/27/20 08/01/22 07/24/22 10:00 Rx











Active Medications: 














Generic Name Dose Route Start Last Admin





  Trade Name Freq  PRN Reason Stop Dose Admin


 


Acetaminophen  650 mg  07/25/22 16:54  07/27/22 09:52





  Acetaminophen 325 Mg Tab  PO   650 mg





  Q4H PRN   Administration





  Pain MILD(1-3)/Fever >100.5/HA  


 


Amiodarone HCl  200 mg  08/01/22 22:00 





  Amiodarone 200 Mg Tab  PO  





  BID LYNDA  


 


Aspirin  325 mg  07/26/22 10:00  08/01/22 09:52





  Aspirin 325 Mg Tab  PO   325 mg





  QDAY LYNDA   Administration


 


Dextrose  50 ml  07/26/22 10:45 





  Dextrose 50% In Water (25gm) 50 Ml Syringe  IV  





  Q30MIN PRN  





  Hypoglycemia  





  Protocol  


 


Glucagon  1 mg  07/25/22 15:39  07/25/22 16:08





  Glucagon (Human Recombinant) 1 Mg/Ml Inj  IV   1 mg





  Q1H PRN   Administration





  Hypoglycemia  


 


Sodium Chloride  100 mls @ 999 mls/hr  07/26/22 07:08 





  Nacl 0.9%  IV  





  CASSIDY PRN  





  Hypotension  


 


Heparin Sodium/Sodium Chloride  25,000 unit in 500 mls @ 30 mls/hr  07/27/22 

12:00  08/01/22 09:52





  Heparin/ 0.45% Nacl-25,000 Unit/500 Ml  IV   2,300 units/hr





  TITR LYNDA   46 mls/hr





    Administration





  Protocol  





  1,500 UNITS/HR  


 


Insulin Human Regular  0 units  07/29/22 16:30  08/01/22 12:10





  Insulin Regular, Human 100 Units/1 Ml  SUB-Q   Not Given





  [facility]  





  Protocol  


 


Metoprolol Tartrate  50 mg  07/31/22 17:00  08/01/22 09:52





  Metoprolol Tartrate 25 Mg Tab  PO   50 mg





  Q8H LYNDA   Administration


 


Morphine Sulfate  2 mg  07/25/22 16:54  07/27/22 13:48





  Morphine 2 Mg/1 Ml Inj  IV   2 mg





  Q4H PRN   Administration





  Pain, Moderate (4-6)  


 


Ondansetron HCl  4 mg  07/27/22 12:00  07/27/22 13:48





  Ondansetron 4 Mg/2 Ml Inj  IV   4 mg





  Q6H PRN   Administration





  Nausea And Vomiting  


 


Oxycodone/Acetaminophen  1 tab  07/25/22 16:54  07/31/22 22:03





  Oxycodone /Acetaminophen 5-325mg Tab  PO   1 tab





  Q6H PRN   Administration





  Pain, Moderate (4-6)  


 


Sodium Chloride  10 ml  07/25/22 22:00  08/01/22 09:52





  Sodium Chloride 0.9% 10 Ml Flush Syringe  IV   10 ml





  BID LYNDA   Administration


 


Sodium Chloride  10 ml  07/25/22 16:54 





  Sodium Chloride 0.9% 10 Ml Flush Syringe  IV  





  PRN PRN  





  LINE FLUSH  














HEART Score





- HEART Score


Age: 45-65


Risk factors: > 3 risk factors or hx of atherosclerotic disease


Troponin: 1-3x normal limit





- Critical Actions


Critical Actions: 4-6 pts:12-16.6% risk of adverse cardiac event. Should be 

admitted

## 2022-08-02 LAB
APTT BLD: 51.9 SEC. (ref 24.2–36.6)
BASOPHILS # (AUTO): 0.2 K/MM3 (ref 0–0.1)
BASOPHILS NFR BLD AUTO: 1.6 % (ref 0–1.8)
BUN SERPL-MCNC: 16 MG/DL (ref 9–20)
BUN/CREAT SERPL: 11 %
CALCIUM SERPL-MCNC: 9.1 MG/DL (ref 8.4–10.2)
EOSINOPHIL # BLD AUTO: 0.3 K/MM3 (ref 0–0.4)
EOSINOPHIL NFR BLD AUTO: 1.7 % (ref 0–4.3)
HCT VFR BLD CALC: 39.8 % (ref 35.5–45.6)
HEMOLYSIS INDEX: 15
HGB BLD-MCNC: 13 GM/DL (ref 11.8–15.2)
INR PPP: 0.87 (ref 0.87–1.13)
LYMPHOCYTES # BLD AUTO: 2.4 K/MM3 (ref 1.2–5.4)
LYMPHOCYTES NFR BLD AUTO: 15.2 % (ref 13.4–35)
MCHC RBC AUTO-ENTMCNC: 33 % (ref 32–34)
MCV RBC AUTO: 90 FL (ref 84–94)
MONOCYTES # (AUTO): 1.1 K/MM3 (ref 0–0.8)
MONOCYTES % (AUTO): 7.3 % (ref 0–7.3)
PLATELET # BLD: 369 K/MM3 (ref 140–440)
RBC # BLD AUTO: 4.4 M/MM3 (ref 3.65–5.03)

## 2022-08-02 RX ADMIN — METOPROLOL TARTRATE SCH MG: 25 TABLET, FILM COATED ORAL at 18:09

## 2022-08-02 RX ADMIN — HEPARIN SODIUM SCH MLS/HR: 5000 INJECTION, SOLUTION INTRAVENOUS at 18:11

## 2022-08-02 RX ADMIN — INSULIN HUMAN SCH: 100 INJECTION, SOLUTION PARENTERAL at 12:26

## 2022-08-02 RX ADMIN — INSULIN HUMAN SCH: 100 INJECTION, SOLUTION PARENTERAL at 21:18

## 2022-08-02 RX ADMIN — INSULIN HUMAN SCH: 100 INJECTION, SOLUTION PARENTERAL at 08:25

## 2022-08-02 RX ADMIN — Medication SCH ML: at 09:07

## 2022-08-02 RX ADMIN — METOPROLOL TARTRATE SCH MG: 25 TABLET, FILM COATED ORAL at 11:15

## 2022-08-02 RX ADMIN — INSULIN HUMAN SCH: 100 INJECTION, SOLUTION PARENTERAL at 08:26

## 2022-08-02 RX ADMIN — AMIODARONE HYDROCHLORIDE SCH MG: 200 TABLET ORAL at 21:20

## 2022-08-02 RX ADMIN — ASPIRIN SCH MG: 325 TABLET ORAL at 09:06

## 2022-08-02 RX ADMIN — AMIODARONE HYDROCHLORIDE SCH MG: 200 TABLET ORAL at 09:07

## 2022-08-02 RX ADMIN — INSULIN HUMAN SCH: 100 INJECTION, SOLUTION PARENTERAL at 17:20

## 2022-08-02 RX ADMIN — OXYCODONE AND ACETAMINOPHEN PRN TAB: 5; 325 TABLET ORAL at 13:29

## 2022-08-02 RX ADMIN — HEPARIN SODIUM SCH MLS/HR: 5000 INJECTION, SOLUTION INTRAVENOUS at 07:36

## 2022-08-02 RX ADMIN — Medication SCH ML: at 21:19

## 2022-08-02 RX ADMIN — METOPROLOL TARTRATE SCH MG: 25 TABLET, FILM COATED ORAL at 09:06

## 2022-08-02 NOTE — PROGRESS NOTE
Assessment and Plan





- Patient Problems


(1) CARLEY (acute kidney injury)


Current Visit: Yes   Status: Acute   


Plan to address problem: 


Patient has not required any further dialysis at this time and renal function 

continues to improve.  He has excellent urine output at this time.  Vascath has 

been removed.








(2) Acute on chronic HFrEF (heart failure with reduced ejection fraction)


Current Visit: Yes   Status: Acute   


Plan to address problem: 


Cardiology recommendations reviewed.  Diuretics as recommended.  Counseled on 

importance of fluid and sodium restrictions.  Currently patient seems euvolemic 

on examination.  Overall respiratory status is stable.  We will continue to 

monitor closely.








(3) Hyperkalemia


Current Visit: Yes   Status: Acute   


Plan to address problem: 


Potassium levels have improved with dialysis.  We will continue to monitor 

closely.  Counseled patient on the importance of a low potassium diet in 

general.








(4) Hyponatremia


Current Visit: Yes   Status: Acute   


Plan to address problem: 


Likely in the setting of volume overload.  Improvement since admission.








(5) Diabetes mellitus


Current Visit: Yes   Status: Chronic   


Plan to address problem: 


Diabetes management per primary attending.








Subjective


Date of service: 08/02/22


Principal diagnosis: CARLEY, Acute on chronic HFrEF, OPAD, NICMP, Permanent AF with

RVR


Interval history: 





No acute complaints. He had vascath removed yesterday. Pending am labs. 





Objective





- Vital Signs


Vital signs: 


                               Vital Signs - 12hr











  08/01/22 08/01/22 08/02/22





  22:00 23:25 01:00


 


Temperature  98.7 F 


 


Pulse Rate  97 H 95 H


 


Respiratory  20 





Rate   


 


Blood Pressure  146/93 


 


O2 Sat by Pulse 96 96 





Oximetry   














  08/02/22 08/02/22





  04:26 08:04


 


Temperature 97.9 F 98.6 F


 


Pulse Rate 79 116 H


 


Respiratory 18 18





Rate  


 


Blood Pressure 141/99 140/98


 


O2 Sat by Pulse 97 96





Oximetry  














- General Appearance


General appearance: well-developed, obese


EENT: ATNC


Neck: no JVD


Respiratory: Present: Clear to Ascultation


Cardiology: regular


Gastrointestinal: normal


Integumentary: no rash


Neurologic: no focal deficit


Musculoskeletal: deferred


Psychiatric: cooperative





- Lab





                                 08/01/22 03:56





                                 08/01/22 03:56


                             Most recent lab results











Calcium  9.1 mg/dL (8.4-10.2)   08/01/22  03:56    


 


Phosphorus  3.60 mg/dL (2.5-4.5)   07/29/22  05:50    


 


Magnesium  1.80 mg/dL (1.7-2.3)   07/29/22  05:50    


 


Urine Creatinine  62.6 mg/dL (0.1-20.0)  H  07/28/22  07:10    


 


Urine Sodium  26 mmol/L  07/28/22  07:10    


 


Urine Total Protein  15 mg/dL (5-11.8)  H  07/28/22  07:10    














- Allied health notes


Allied health notes reviewed: nursing





Medications & Allergies





- Medications


Allergies/Adverse Reactions: 


                                    Allergies





adhesive Allergy (Verified 07/25/22 14:07)


   Rash


ibuprofen [From Motrin] Allergy (Verified 07/25/22 14:07)


   Swelling


lisinopril Allergy (Verified 07/25/22 14:07)


   COUGH


Iodinated Contrast Media [Iodinated Contrast Media - IV Dye] Adverse Reaction 

(Verified 07/25/22 14:07)


   KIDNEYS SHUT DOWN








Home Medications: 


                                Home Medications











 Medication  Instructions  Recorded  Confirmed  Last Taken  Type


 


Digoxin [Lanoxin] 0.25 mg PO DAILY@1700 #30 tablet 01/05/15 08/01/22 07/25/22 

10:00 Rx


 


Aspirin 325 mg PO QDAY 06/27/17 08/01/22 07/25/22 10:00 History





    325 


 


Furosemide [Lasix TAB] 40 mg PO QDAY 06/27/17 08/01/22 07/25/22 History


 


Losartan [Cozaar] 50 mg PO QDAY 06/27/17 08/01/22 07/25/22 10:00 History


 


HYDROcodone/APAP 5-325 [[place] 1 each PO Q6HR PRN #20 tablet 02/27/20 08/01/22 07/25/22 10:00 Rx





5/325]    1 


 


Penicillin Vk [Veetids TAB] 500 mg PO QID #21 tablet 02/27/20 08/01/22 07/25/22 

10:00 Rx


 


Acetaminophen [Acetaminophen TAB] 1,000 mg PO Q6HR #30 tablet 05/27/20 08/01/22 07/25/22 10:00 Rx





    1000 


 


cephALEXin [Keflex] 500 mg PO Q8HR #30 cap 05/27/20 08/01/22 07/24/22 10:00 Rx











Active Medications: 














Generic Name Dose Route Start Last Admin





  Trade Name Freq  PRN Reason Stop Dose Admin


 


Acetaminophen  650 mg  07/25/22 16:54  07/27/22 09:52





  Acetaminophen 325 Mg Tab  PO   650 mg





  Q4H PRN   Administration





  Pain MILD(1-3)/Fever >100.5/HA  


 


Amiodarone HCl  200 mg  08/01/22 22:00  08/02/22 09:07





  Amiodarone 200 Mg Tab  PO   200 mg





  BID LYNDA   Administration


 


Aspirin  325 mg  07/26/22 10:00  08/02/22 09:06





  Aspirin 325 Mg Tab  PO   325 mg





  QDAY LYNDA   Administration


 


Dextrose  50 ml  07/26/22 10:45 





  Dextrose 50% In Water (25gm) 50 Ml Syringe  IV  





  Q30MIN PRN  





  Hypoglycemia  





  Protocol  


 


Glucagon  1 mg  07/25/22 15:39  07/25/22 16:08





  Glucagon (Human Recombinant) 1 Mg/Ml Inj  IV   1 mg





  Q1H PRN   Administration





  Hypoglycemia  


 


Heparin Sodium/Sodium Chloride  25,000 unit in 500 mls @ 30 mls/hr  07/27/22 

12:00  08/02/22 07:36





  Heparin/ 0.45% Nacl-25,000 Unit/500 Ml  IV   2,300 units/hr





  TITR LYNDA   46 mls/hr





    Administration





  Protocol  





  1,500 UNITS/HR  


 


Insulin Human Regular  0 units  07/29/22 16:30  08/02/22 08:26





  Insulin Regular, Human 100 Units/1 Ml  SUB-Q   Not Given





  [facility]  





  Protocol  


 


Metoprolol Tartrate  50 mg  07/31/22 17:00  08/02/22 09:06





  Metoprolol Tartrate 25 Mg Tab  PO   50 mg





  Q8H LYNDA   Administration


 


Morphine Sulfate  2 mg  07/25/22 16:54  07/27/22 13:48





  Morphine 2 Mg/1 Ml Inj  IV   2 mg





  Q4H PRN   Administration





  Pain, Moderate (4-6)  


 


Ondansetron HCl  4 mg  07/27/22 12:00  07/27/22 13:48





  Ondansetron 4 Mg/2 Ml Inj  IV   4 mg





  Q6H PRN   Administration





  Nausea And Vomiting  


 


Oxycodone/Acetaminophen  1 tab  07/25/22 16:54  08/01/22 21:40





  Oxycodone /Acetaminophen 5-325mg Tab  PO   1 tab





  Q6H PRN   Administration





  Pain, Moderate (4-6)  


 


Sodium Chloride  10 ml  07/25/22 22:00  08/02/22 09:07





  Sodium Chloride 0.9% 10 Ml Flush Syringe  IV   10 ml





  BID LYNDA   Administration


 


Sodium Chloride  10 ml  07/25/22 16:54 





  Sodium Chloride 0.9% 10 Ml Flush Syringe  IV  





  PRN PRN  





  LINE FLUSH

## 2022-08-02 NOTE — PROGRESS NOTE
Assessment and Plan


Assessment and plan: 





This is a 42-year-old male with HTN, right-sided CVA with residual left-sided 

hemiplegia and bedridden's, HFrEF, MI s/p stent placement, DM, asthma, COPD, 

current nicotine abuse, A. fib and noncompliance with A. fib with RVR, acute 

kidney injury with hypoglycemia and severe hyperkalemia. 








#CARLEY secondary to vasomotor nephropathyimproving


 Creatinine now 1.4


 Renally dose medications and avoid nephrotoxic drugs.  Currently holding ACE 

inhibitors and ARB's.


- vasc cath removed


 Nephrology consulted; appreciate recs


 Continue to monitor





#Peripheral arterial disease


#Dry gangrene of right great toe


 Vascular surgery consulted; appreciate recs


 Pending angiogram and revascularization once patient's renal status has 

returned back to baseline.  Possibly as early as 8/3/2022.


 Continue heparin drip for now





#Atrial fibrillationstable


#History of MI status post stent placement


#Nonischemic cardiomyopathy


 Continue amiodarone 400 mg twice daily, heparin drip, and metoprolol tartrate 

25 mg every 6 hours


 Cardiology consulted; appreciate recs


 Continue to monitor





#Leukocytosis-stable


 Continue to monitor





#History of right sided CVA with residual left-sided hemiplegia


#Bedridden


 Continue goal-directed therapy





#COPD


 Continue DuoNebs and albuterol nebs as needed





#Mild protein caloric malnutrition


 Continue dietary supplementation





#Morbid obesity


#Weight loss counseling


#Exercise counseling


- BMI 41.4


- Counseled patient on the importance of weight loss, incorporating exercise, 

and dietary changes (lean meats, fresh fruits and vegetables, and water intake).

Patient expresses understanding. 


- Time: +15 min





#Tobacco dependence


#Tobacco/Smoking cessation counseling


- Counseled patient about the importance of smoking cessation and the possible 

sequelae as a result of continued tobacco consumption. The patient expresses 

understanding. 


-Time: +15 mins








Resolved diagnoses:


Hyponatremia


Hypochloremia


SIRS


Hypoglycemia


Acute metabolic encephalopathy


Acute on chronic systolic heart failure


Sepsis





#Advanced care planning


-Disease education conducted, care plan discussed, diagnoses discussed, 

prognosis discussed, and patient acknowledges understanding with care plan


-Time: +30 min








History


Interval history: 





No acute events overnight.  Patient reports pain in right great toe not 

different from baseline pain.  He has no complaints at this time.  We discussed 

the current care plan, patient agreeable.





Hospitalist Physical





- Physical exam


Narrative exam: 





GENERAL: Well-developed well-nourished. In no acute distress.


HEENT: Normocephalic. Atraumatic. 


NECK: Supple.  


CHEST/LUNGS: CTAB on room air


HEART/CARDIOVASCULAR: RRR. No murmur, rubs or gallops appreciated.


ABDOMEN: +BS. NT/ND.


SKIN: No rashes noted.


NEURO:  No focal motor deficit.  Follows all commands.


MUSCULOSKELETAL: No joint effusion 


EXTREMITIES: No cyanosis, clubbing or edema.


PSYCH: Cooperative.





- Constitutional


Vitals: 


                                        











Temp Pulse Resp BP Pulse Ox


 


 98.6 F   116 H  18   140/98   96 


 


 08/02/22 08:04  08/02/22 08:04  08/02/22 08:04  08/02/22 08:04  08/02/22 08:04











General appearance: Present: no acute distress, well-nourished, obese, other 

(Bedridden)





HEART Score





- HEART Score


Age: 45-65


Risk factors: > 3 risk factors or hx of atherosclerotic disease


Troponin: 1-3x normal limit





- Critical Actions


Critical Actions: 4-6 pts:12-16.6% risk of adverse cardiac event. Should be 

admitted





Results





- Labs


CBC & Chem 7: 


                                 08/02/22 11:33





                                 08/02/22 11:33


Labs: 


                             Laboratory Last Values











WBC  15.4 K/mm3 (4.5-11.0)  H  08/02/22  11:33    


 


RBC  4.40 M/mm3 (3.65-5.03)   08/02/22  11:33    


 


Hgb  13.0 gm/dl (11.8-15.2)   08/02/22  11:33    


 


Hct  39.8 % (35.5-45.6)   08/02/22  11:33    


 


MCV  90 fl (84-94)   08/02/22  11:33    


 


MCH  30 pg (28-32)   08/02/22  11:33    


 


MCHC  33 % (32-34)   08/02/22  11:33    


 


RDW  14.7 % (13.2-15.2)   08/02/22  11:33    


 


Plt Count  369 K/mm3 (140-440)   08/02/22  11:33    


 


Lymph % (Auto)  15.2 % (13.4-35.0)   08/02/22  11:33    


 


Mono % (Auto)  7.3 % (0.0-7.3)   08/02/22  11:33    


 


Eos % (Auto)  1.7 % (0.0-4.3)   08/02/22  11:33    


 


Baso % (Auto)  1.6 % (0.0-1.8)   08/02/22  11:33    


 


Lymph # (Auto)  2.4 K/mm3 (1.2-5.4)   08/02/22  11:33    


 


Mono # (Auto)  1.1 K/mm3 (0.0-0.8)  H  08/02/22  11:33    


 


Eos # (Auto)  0.3 K/mm3 (0.0-0.4)   08/02/22  11:33    


 


Baso # (Auto)  0.2 K/mm3 (0.0-0.1)  H  08/02/22  11:33    


 


Add Manual Diff  Complete   08/01/22  03:56    


 


Total Counted  100   08/01/22  03:56    


 


Seg Neutrophils %  74.2 % (40.0-70.0)  H  08/02/22  11:33    


 


Seg Neuts % (Manual)  81.0 % (40.0-70.0)  H  08/01/22  03:56    


 


Band Neutrophils %  0 %  08/01/22  03:56    


 


Lymphocytes % (Manual)  11.0 % (13.4-35.0)  L  08/01/22  03:56    


 


Reactive Lymphs % (Man)  0 %  08/01/22  03:56    


 


Monocytes % (Manual)  5.0 % (0.0-7.3)   08/01/22  03:56    


 


Eosinophils % (Manual)  3.0 % (0.0-4.3)   08/01/22  03:56    


 


Basophils % (Manual)  0 % (0.0-1.8)   08/01/22  03:56    


 


Metamyelocytes %  0 %  08/01/22  03:56    


 


Myelocytes %  0 %  08/01/22  03:56    


 


Promyelocytes %  0 %  08/01/22  03:56    


 


Blast Cells %  0 %  08/01/22  03:56    


 


Nucleated RBC %  Not Reportable   08/01/22  03:56    


 


Seg Neutrophils #  11.5 K/mm3 (1.8-7.7)  H  08/02/22  11:33    


 


Seg Neutrophils # Man  11.5 K/mm3 (1.8-7.7)  H  08/01/22  03:56    


 


Band Neutrophils #  0.0 K/mm3  08/01/22  03:56    


 


Lymphocytes # (Manual)  1.6 K/mm3 (1.2-5.4)   08/01/22  03:56    


 


Abs React Lymphs (Man)  0.0 K/mm3  08/01/22  03:56    


 


Monocytes # (Manual)  0.7 K/mm3 (0.0-0.8)   08/01/22  03:56    


 


Eosinophils # (Manual)  0.4 K/mm3 (0.0-0.4)   08/01/22  03:56    


 


Basophils # (Manual)  0.0 K/mm3 (0.0-0.1)   08/01/22  03:56    


 


Metamyelocytes #  0.0 K/mm3  08/01/22  03:56    


 


Myelocytes #  0.0 K/mm3  08/01/22  03:56    


 


Promyelocytes #  0.0 K/mm3  08/01/22  03:56    


 


Blast Cells #  0.0 K/mm3  08/01/22  03:56    


 


WBC Morphology  Not Reportable   08/01/22  03:56    


 


Hypersegmented Neuts  Not Reportable   08/01/22  03:56    


 


Hyposegmented Neuts  Not Reportable   08/01/22  03:56    


 


Hypogranular Neuts  Not Reportable   08/01/22  03:56    


 


Smudge Cells  Not Reportable   08/01/22  03:56    


 


Toxic Granulation  Not Reportable   08/01/22  03:56    


 


Toxic Vacuolation  Not Reportable   08/01/22  03:56    


 


Dohle Bodies  Not Reportable   08/01/22  03:56    


 


Pelger-Huet Anomaly  Not Reportable   08/01/22  03:56    


 


Cb Rods  Not Reportable   08/01/22  03:56    


 


Platelet Estimate  Consistent w auto   08/01/22  03:56    


 


Clumped Platelets  Not Reportable   08/01/22  03:56    


 


Plt Clumps, EDTA  Not Reportable   08/01/22  03:56    


 


Large Platelets  Not Reportable   08/01/22  03:56    


 


Giant Platelets  Not Reportable   08/01/22  03:56    


 


Platelet Satelliting  Not Reportable   08/01/22  03:56    


 


Plt Morphology Comment  Not Reportable   08/01/22  03:56    


 


RBC Morphology  Not Reportable   08/01/22  03:56    


 


Dimorphic RBCs  Not Reportable   08/01/22  03:56    


 


Polychromasia  Not Reportable   08/01/22  03:56    


 


Hypochromasia  Not Reportable   08/01/22  03:56    


 


Poikilocytosis  Not Reportable   08/01/22  03:56    


 


Anisocytosis  1+   08/01/22  03:56    


 


Microcytosis  Not Reportable   08/01/22  03:56    


 


Macrocytosis  Not Reportable   08/01/22  03:56    


 


Spherocytes  Not Reportable   08/01/22  03:56    


 


Pappenheimer Bodies  Not Reportable   08/01/22  03:56    


 


Sickle Cells  Not Reportable   08/01/22  03:56    


 


Target Cells  Not Reportable   08/01/22  03:56    


 


Tear Drop Cells  Not Reportable   08/01/22  03:56    


 


Ovalocytes  Not Reportable   08/01/22  03:56    


 


Helmet Cells  Not Reportable   08/01/22  03:56    


 


Ge-Ideal Bodies  Not Reportable   08/01/22  03:56    


 


Cabot Rings  Not Reportable   08/01/22  03:56    


 


Woolford Cells  Not Reportable   08/01/22  03:56    


 


Bite Cells  Not Reportable   08/01/22  03:56    


 


Crenated Cell  Not Reportable   08/01/22  03:56    


 


Elliptocytes  Not Reportable   08/01/22  03:56    


 


Acanthocytes (Spur)  Not Reportable   08/01/22  03:56    


 


Rouleaux  Not Reportable   08/01/22  03:56    


 


Hemoglobin C Crystals  Not Reportable   08/01/22  03:56    


 


Schistocytes  Not Reportable   08/01/22  03:56    


 


Malaria parasites  Not Reportable   08/01/22  03:56    


 


Justin Bodies  Not Reportable   08/01/22  03:56    


 


Hem Pathologist Commnt  No   08/01/22  03:56    


 


PT  13.0 Sec. (12.2-14.9)   08/02/22  11:33    


 


INR  0.87  (0.87-1.13)   08/02/22  11:33    


 


APTT  51.9 Sec. (24.2-36.6)  H  08/02/22  11:33    


 


Heparin Anti-Xa Level  0.14 U.I./ml (0.3-0.7)  L  08/02/22  11:33    


 


Sodium  139 mmol/L (137-145)   08/01/22  03:56    


 


Potassium  3.8 mmol/L (3.6-5.0)   08/01/22  03:56    


 


Chloride  103.5 mmol/L ()   08/01/22  03:56    


 


Carbon Dioxide  26 mmol/L (22-30)   08/01/22  03:56    


 


Anion Gap  13 mmol/L  08/01/22  03:56    


 


BUN  19 mg/dL (9-20)   08/01/22  03:56    


 


Creatinine  1.5 mg/dL (0.8-1.3)  H  08/01/22  03:56    


 


Estimated GFR  > 60 ml/min  08/01/22  03:56    


 


BUN/Creatinine Ratio  13 %  08/01/22  03:56    


 


Glucose  131 mg/dL ()  H  08/01/22  03:56    


 


POC Glucose  135 mg/dL ()  H  08/02/22  11:21    


 


Calcium  9.1 mg/dL (8.4-10.2)   08/01/22  03:56    


 


Phosphorus  3.60 mg/dL (2.5-4.5)   07/29/22  05:50    


 


Magnesium  1.80 mg/dL (1.7-2.3)   07/29/22  05:50    


 


Total Bilirubin  0.70 mg/dL (0.1-1.2)   07/27/22  04:00    


 


AST  65 units/L (5-40)  H  07/27/22  04:00    


 


ALT  36 units/L (7-56)   07/27/22  04:00    


 


Alkaline Phosphatase  129 units/L ()   07/27/22  04:00    


 


NT-Pro-B Natriuret Pep  45404 pg/mL (0-450)  H  07/26/22  04:16    


 


Total Protein  6.5 g/dL (6.3-8.2)   07/27/22  04:00    


 


Albumin  2.6 g/dL (3.9-5)  L  07/27/22  04:00    


 


Albumin/Globulin Ratio  0.7 %  07/27/22  04:00    


 


Urine Osmolality  212 Mosm/kg  07/28/22  11:46    


 


Urine Creatinine  62.6 mg/dL (0.1-20.0)  H  07/28/22  07:10    


 


Urine Sodium  26 mmol/L  07/28/22  07:10    


 


Urine Total Protein  15 mg/dL (5-11.8)  H  07/28/22  07:10    


 


Coronavirus (PCR)  Negative  (Negative)   07/30/22  11:58    


 


Hepatitis A IgM Ab  Non-reactive  (NonReactive)   07/26/22  11:10    


 


Hep Bs Antigen  Non-reactive  (Negative)   07/26/22  11:10    


 


Hep B Core IgM Ab  Non-reactive  (NonReactive)   07/26/22  11:10    


 


Hepatitis C Antibody  Non-reactive  (NonReactive)   07/26/22  11:10    











Zamarripa/IV: 


                                        





Voiding Method                   Urinal











Active Medications





- Current Medications


Current Medications: 














Generic Name Dose Route Start Last Admin





  Trade Name Freq  PRN Reason Stop Dose Admin


 


Acetaminophen  650 mg  07/25/22 16:54  07/27/22 09:52





  Acetaminophen 325 Mg Tab  PO   650 mg





  Q4H PRN   Administration





  Pain MILD(1-3)/Fever >100.5/HA  


 


Amiodarone HCl  200 mg  08/01/22 22:00  08/02/22 09:07





  Amiodarone 200 Mg Tab  PO   200 mg





  BID LYNDA   Administration


 


Aspirin  325 mg  07/26/22 10:00  08/02/22 09:06





  Aspirin 325 Mg Tab  PO   325 mg





  QDAY LYNDA   Administration


 


Dextrose  50 ml  07/26/22 10:45 





  Dextrose 50% In Water (25gm) 50 Ml Syringe  IV  





  Q30MIN PRN  





  Hypoglycemia  





  Protocol  


 


Glucagon  1 mg  07/25/22 15:39  07/25/22 16:08





  Glucagon (Human Recombinant) 1 Mg/Ml Inj  IV   1 mg





  Q1H PRN   Administration





  Hypoglycemia  


 


Heparin Sodium/Sodium Chloride  25,000 unit in 500 mls @ 30 mls/hr  07/27/22 

12:00  08/02/22 07:36





  Heparin/ 0.45% Nacl-25,000 Unit/500 Ml  IV   2,300 units/hr





  TITR LYNDA   46 mls/hr





    Administration





  Protocol  





  1,500 UNITS/HR  


 


Insulin Human Regular  0 units  07/29/22 16:30  08/02/22 08:26





  Insulin Regular, Human 100 Units/1 Ml  SUB-Q   Not Given





  [facility]  





  Protocol  


 


Metoprolol Tartrate  75 mg  08/02/22 10:17  08/02/22 11:15





  Metoprolol Tartrate 25 Mg Tab  PO   25 mg





  Q8H LYNDA   Administration


 


Morphine Sulfate  2 mg  07/25/22 16:54  07/27/22 13:48





  Morphine 2 Mg/1 Ml Inj  IV   2 mg





  Q4H PRN   Administration





  Pain, Moderate (4-6)  


 


Ondansetron HCl  4 mg  07/27/22 12:00  07/27/22 13:48





  Ondansetron 4 Mg/2 Ml Inj  IV   4 mg





  Q6H PRN   Administration





  Nausea And Vomiting  


 


Oxycodone/Acetaminophen  1 tab  07/25/22 16:54  08/01/22 21:40





  Oxycodone /Acetaminophen 5-325mg Tab  PO   1 tab





  Q6H PRN   Administration





  Pain, Moderate (4-6)  


 


Sodium Chloride  10 ml  07/25/22 22:00  08/02/22 09:07





  Sodium Chloride 0.9% 10 Ml Flush Syringe  IV   10 ml





  BID LYNDA   Administration


 


Sodium Chloride  10 ml  07/25/22 16:54 





  Sodium Chloride 0.9% 10 Ml Flush Syringe  IV  





  PRN PRN  





  LINE FLUSH  














Nutrition/Malnutrition Assess





- Dietary Evaluation


Nutrition/Malnutrition Findings: 


                                        





Nutrition Notes                                            Start:  08/01/22 

14:11


Freq:                                                      Status: Active       




Protocol:                                                                       




 Document     08/01/22 14:11  MOODY  (Rec: 08/01/22 14:51  MOODY  [id number])


 Nutrition Notes


     Need for Assessment generated from:         LOS


     Initial or Follow up                        Assessment


     Current Diagnosis                           Acute Kidney Injury,COPD,


                                                 Malnutrition,Stroke


     Other Pertinent Diagnosis                   PAD, R-Great Toe Gangrene,


                                                 Cardiomyopathy, Leukocytosis,


                                                 Atrial Fibrilation


     Current Diet                                Renal Diet (since D 07/25).


     Labs/Tests                                  08/01: Crea 1.5, Glu 131.


     Pertinent Medications                       08/01: Nutritionally


                                                 unremarkable.


     Height                                      6 ft


     Weight                                      138 kg


     Ideal Body Weight (kg)                      80.90


     BMI                                         41.2


     Intake Prior to Admission                   Good


     Weight change and time frame                Pt denies having loss body


                                                 weight PTA.


     Weight Status                               Morbidly Obese


     Subjective/Other Information                RD consult for LOS assessment.


                                                 No reports available on Pt's


                                                 PO intake of meals, but MD


                                                 states that dies has been well


                                                 tolerated at the time,


                                                 according to Progress notes.


                                                 Pt is on Room Air, O2


                                                 saturation @ 07%, according to


                                                 Physical Assessment History


                                                 notes.


                                                 Pt has missing teeth,


                                                 according to Physical


                                                 Assessment History notes.


                                                 Pt presents R-Toe gangrene and


                                                 a sacral area of concern for


                                                 skin risk at the time,


                                                 according to Physical


                                                 Assessment History notes.


                                                 I will prescribe dietary


                                                 supplements to support wound


                                                 healing processes during LOS.


                                                 Pt presents bilateral-LE


                                                 Pitting Edema 1+, according to


                                                 Physical Assessment History


                                                 notes.


                                                 Pt is bedridden, according to


                                                 Physical Assessment History


                                                 notes.


                                                 Pt will undergo for possible


                                                 revascularization on 08/03,


                                                 according to [facility] notes.


     Percent of energy/protein needs met:        Prescribed Renal Diet provides


                                                 for energy/protein needs (2,


                                                 072 Kcal/77 g) during LOS;


                                                 additionally, Dietary


                                                 Supplements will support wound


                                                 healing processes with 190


                                                 Kcal and 5 g of protein.


     Burn                                        Absent


     Trauma                                      Absent


     GI Symptoms                                 None


     Food Allergy                                No


     Skin Integrity/Comment                      R-Toe gangrene & sacral area


                                                 of con


     Current % PO                                Good (%)


     Minimum of two criteria                     No


     Fluid Accumulation                          Mild (non-severe)


     Reduced  Strength                       N/A (non-severe)


     Protein-Calorie Malnutrition                N\A


     #1


      Nutrition Diagnosis                        Increased nutrient needs   (


                                                 specify in comment below)


      Comments:                                  Protein to support wound


                                                 healing processes.


      Etiology                                   Pt being bedridden, negligence


                                                 .


      As Evidenced by Signs and Symptoms         Pt presents R-Toe gangrene and


                                                 a sacral area of concern for


                                                 skin risk at the time,


                                                 according to Physical


                                                 Assessment History notes.


     Is patient on ventilator?                   No


     Is Patient Ambulatory and/or Out of Bed     No


     REE-([facility]-confined to bed)      2795.784


     Kcal/Kg value to use for calculation        16


     Approximate Energy Requirements Using       2208


      kcal/Kg                                    


     Calculation Used for Recommendations        Kcal/kg


     Additional Notes                            Protein: 1.25-1.5 g/Kg AdjBW;


                                                 138-165 g/day.


                                                 Fluids: 1 ml/Kcal, or as per


                                                 MD.


 Nutrition Intervention


     Change Diet Order:                          Continue Renal Diet.


     Add Supplement/Snack (indicate name/kcal    Start 28.8 g pkt Tru; BID.


      /protein )                                 


     Provides kCal:                              190


     Provides Protein (gm)                       5


     Goal #1                                     Support, through dietary


                                                 supplementation, wound healing


                                                 processes during LOS.


     Goal #2                                     Adjust the dietary


                                                 intervention to better serve


                                                 Pt's needs and clinical


                                                 conditions during LOS.


     Follow-Up By:                               08/08/22


     Additional Comments                         Continue monitoring food


                                                 tolerance, %PO intake of meals


                                                 , dietary supplements, and BM.

## 2022-08-02 NOTE — PROGRESS NOTE
Assessment and Plan





Patient is a 42-year-old male with a past medical history of HFrEF, nonischemic 

cardiomyopathy, chronic A. fib, hypertension, COPD, diabetes, history of CVA in 

January 2021 with left-sided hemiplegia and bedridden who came to the ED for 

complaint of weakness and hypoglycemia





Acute renal failure-nephrology following


Altered mental status


Acute on chronic heart failure


Hyperkalemia


Hyponatremia


Hypoglycemia


Hypertension


Cardiomyopathy


Diabetes


History of CVA


A. fib





Echo 11/8/2020-the study was technically difficult in quality.  Arrhythmia was 

noted during the study LVEF is moderately decreased 35-40%. Regional wall motion

abnormalities noted RV moderately dilated. RV systolic function is moderately 

reduced. RVSP is mildly elevated No pericardial effusion No definite or 

significant change in the EF from 10/15/2018 Consider an infiltrative 

cardiomyopathy such as amyloid


Echo 7/26/2022-EF 20 to 25% moderate concentric LVH.  Right ventricle is 

dilated.  Right ventricle hypokinetic.  Left atrium is severely dilated.  Right 

atrium dilated


Per documentation outpatient medications: Losartan 50 mg p.o. daily Lasix 40 mg 

p.o. daily, digoxin 0.25 mg p.o. daily, asa





Plan:





Telemetry reviewed patient is in A. fib rate 90s to low 110s


Continue amiodarone 200 mg p.o. twice daily


Increase to  metoprolol 75 mg p.o. every 8 hourly for further rate control


Patient currently anticoagulated on heparin.  Recommend transitioning to Eliquis

however will wait until cleared by vascular


Will defer to nephrology recommendations for volume management


No ACE/ARB due to renal function and documented allergy


Discussed plan of care with patient who verbalized understanding and 

acknowledgment





Patient seen in conjunction with Dr. Reid who agrees with plan of care











- Patient Problems


(1) CARLEY (acute kidney injury)


Current Visit: Yes   Status: Acute   





(2) Hyperkalemia


Current Visit: Yes   Status: Acute   





(3) Hypoglycemia


Current Visit: Yes   Status: Acute   





(4) Hyponatremia


Current Visit: Yes   Status: Acute   





(5) Acute on chronic systolic CHF (congestive heart failure)


Current Visit: No   Status: Acute   





(6) Atrial fibrillation with RVR


Current Visit: No   Status: Acute   





(7) COPD (chronic obstructive pulmonary disease)


Current Visit: Yes   Status: Chronic   





(8) History of noncompliance with medical treatment


Current Visit: No   Status: Acute   





(9) Obesity


Current Visit: No   Status: Acute   





(10) Cardiomyopathy


Current Visit: Yes   Status: Chronic   





Subjective


Date of service: 08/02/22


Principal diagnosis: CARLEY, Acute on chronic HFrEF, OPAD, NICMP, Permanent AF with

RVR


Interval history: 





Patient resting in bed in no acute distress. 


A. fib rates 90s to low 110s





Objective


                                   Vital Signs











  Temp Pulse Resp BP Pulse Ox


 


 08/02/22 08:04  98.6 F  116 H  18  140/98  96


 


 08/02/22 04:26  97.9 F  79  18  141/99  97


 


 08/02/22 01:00   95 H   


 


 08/01/22 23:25  98.7 F  97 H  20  146/93  96


 


 08/01/22 22:00      96


 


 08/01/22 20:07  98.2 F  80  20  139/96  97


 


 08/01/22 13:00   72   














- Physical Examination


General: No Apparent Distress


HEENT: Positive: EOMI, Normocephaly, Mucus Membranes Moist


Neck: Positive: neck supple, trachea midline


Cardiac: Positive: irregularly irregular


Lungs: Positive: Normal Breath Sounds


Neuro: Positive: Grossly Intact


Abdomen: Positive: Soft, Active Bowel Sounds.  Negative: Tender


Skin: Positive: Other (Right great toe gangrene)


Musculoskeletal: Normal Range of Motion


Extremities: Absent: edema (Trace pitting bilateral leg edema)





- Imaging and Cardiology


EKG: report reviewed (Atrial fibrillation with heart rate of 120)


Echo: report reviewed





- Telemetry


EKG Rhythm: Atrial Fibrillation





- EKG


Supraventricular dysrhythmia: atrial fibrillation





- Allied health notes


Allied health notes reviewed: nursing Warm

## 2022-08-03 VITALS — DIASTOLIC BLOOD PRESSURE: 99 MMHG | SYSTOLIC BLOOD PRESSURE: 130 MMHG

## 2022-08-03 LAB
BUN SERPL-MCNC: 16 MG/DL (ref 9–20)
BUN/CREAT SERPL: 12 %
CALCIUM SERPL-MCNC: 8.8 MG/DL (ref 8.4–10.2)
HEMOLYSIS INDEX: 10

## 2022-08-03 RX ADMIN — ASPIRIN SCH MG: 325 TABLET ORAL at 11:12

## 2022-08-03 RX ADMIN — METOPROLOL TARTRATE SCH MG: 25 TABLET, FILM COATED ORAL at 03:24

## 2022-08-03 RX ADMIN — HEPARIN SODIUM SCH MLS/HR: 5000 INJECTION, SOLUTION INTRAVENOUS at 07:17

## 2022-08-03 RX ADMIN — METOPROLOL TARTRATE SCH MG: 25 TABLET, FILM COATED ORAL at 11:16

## 2022-08-03 RX ADMIN — AMIODARONE HYDROCHLORIDE SCH MG: 200 TABLET ORAL at 11:12

## 2022-08-03 RX ADMIN — Medication SCH: at 11:13

## 2022-08-03 NOTE — PROGRESS NOTE
Assessment and Plan





Patient is a 42-year-old male with a past medical history of HFrEF, nonischemic 

cardiomyopathy, chronic A. fib, hypertension, COPD, diabetes, history of CVA in 

January 2021 with left-sided hemiplegia and bedridden who came to the ED for 

complaint of weakness and hypoglycemia





Acute renal failure-nephrology following


Altered mental status


Acute on chronic heart failure


Hyperkalemia


Hyponatremia


Hypoglycemia


Hypertension


Cardiomyopathy


Diabetes


History of CVA


A. fib





Echo 11/8/2020-the study was technically difficult in quality.  Arrhythmia was 

noted during the study LVEF is moderately decreased 35-40%. Regional wall motion

abnormalities noted RV moderately dilated. RV systolic function is moderately 

reduced. RVSP is mildly elevated No pericardial effusion No definite or 

significant change in the EF from 10/15/2018 Consider an infiltrative 

cardiomyopathy such as amyloid


Echo 7/26/2022-EF 20 to 25% moderate concentric LVH.  Right ventricle is 

dilated.  Right ventricle hypokinetic.  Left atrium is severely dilated.  Right 

atrium dilated


Per documentation outpatient medications: Losartan 50 mg p.o. daily Lasix 40 mg 

p.o. daily, digoxin 0.25 mg p.o. daily, asa





Plan:





Telemetry reviewed patient is in A. fib rate 80s to 90


Continue amiodarone 200 mg p.o. twice daily,  metoprolol 75 mg p.o. every 8 

hourly for further rate control


Patient currently anticoagulated on heparin.  Recommend transitioning to Eliquis

however will wait until cleared by vascular


Will defer to nephrology recommendations for volume management


No ACE/ARB due to renal function and documented allergy


Discussed plan of care with patient who verbalized understanding and 

acknowledgment


Cardiac otherwise stable





Patient should follow-up with her primary cardiologist in 1 to 2 weeks after 

discharge.  If patient wishes patient may also follow-up with [facility] 

heart specialist.  Phone #[phone]


Patient seen in conjunction with Dr. Reid who agrees with plan of care











- Patient Problems


(1) CARLEY (acute kidney injury)


Current Visit: Yes   Status: Acute   





(2) Hyperkalemia


Current Visit: Yes   Status: Acute   





(3) Hypoglycemia


Current Visit: Yes   Status: Acute   





(4) Hyponatremia


Current Visit: Yes   Status: Acute   





(5) Acute on chronic systolic CHF (congestive heart failure)


Current Visit: No   Status: Acute   





(6) Atrial fibrillation with RVR


Current Visit: No   Status: Acute   





(7) COPD (chronic obstructive pulmonary disease)


Current Visit: Yes   Status: Chronic   





(8) History of noncompliance with medical treatment


Current Visit: No   Status: Acute   





(9) Obesity


Current Visit: No   Status: Acute   





(10) Cardiomyopathy


Current Visit: Yes   Status: Chronic   





Subjective


Date of service: 08/03/22


Principal diagnosis: CARLEY, Acute on chronic HFrEF, OPAD, NICMP, Permanent AF with

RVR


Interval history: 





Patient resting in bed in no acute distress. 


A. fib rates 80s to 90s





Objective


                                   Vital Signs











  Temp Pulse Resp BP BP Pulse Ox


 


 08/03/22 07:37  98.0 F  70  18  130/99   98


 


 08/03/22 03:24   92 H   147/96  


 


 08/03/22 03:16  97.6 F  92 H  16  147/96   94


 


 08/02/22 23:03  97.7 F  71  14  129/91   98


 


 08/02/22 21:59       98


 


 08/02/22 20:03  98.2 F  99 H  17   137/94  98


 


 08/02/22 15:52  97.9 F  79  18  140/88   98


 


 08/02/22 13:34  97.3 F L  95 H  18  130/89   98


 


 08/02/22 13:00   86    














- Physical Examination


General: No Apparent Distress


HEENT: Positive: EOMI, Normocephaly, Mucus Membranes Moist


Neck: Positive: neck supple, trachea midline


Cardiac: Positive: irregularly irregular


Lungs: Positive: Normal Breath Sounds


Neuro: Positive: Grossly Intact


Abdomen: Positive: Soft, Active Bowel Sounds.  Negative: Tender


Skin: Positive: Other (Right great toe gangrene)


Musculoskeletal: Normal Range of Motion


Extremities: Absent: edema (Trace pitting bilateral leg edema)





- Labs and Meds


                                   Coagulation











  08/02/22 Range/Units





  11:33 


 


PT  13.0  (12.2-14.9)  Sec.


 


INR  0.87  (0.87-1.13)  


 


APTT  51.9 H  (24.2-36.6)  Sec.








                                       CBC











  08/02/22 Range/Units





  11:33 


 


WBC  15.4 H  (4.5-11.0)  K/mm3


 


RBC  4.40  (3.65-5.03)  M/mm3


 


Hgb  13.0  (11.8-15.2)  gm/dl


 


Hct  39.8  (35.5-45.6)  %


 


Plt Count  369  (140-440)  K/mm3


 


Lymph # (Auto)  2.4  (1.2-5.4)  K/mm3


 


Mono # (Auto)  1.1 H  (0.0-0.8)  K/mm3


 


Eos # (Auto)  0.3  (0.0-0.4)  K/mm3


 


Baso # (Auto)  0.2 H  (0.0-0.1)  K/mm3








                          Comprehensive Metabolic Panel











  08/02/22 08/03/22 Range/Units





  11:33 05:21 


 


Sodium  141  142  (137-145)  mmol/L


 


Potassium  4.5  4.0  (3.6-5.0)  mmol/L


 


Chloride  103.6  105.2  ()  mmol/L


 


Carbon Dioxide  24  23  (22-30)  mmol/L


 


BUN  16  16  (9-20)  mg/dL


 


Creatinine  1.4 H  1.3  (0.8-1.3)  mg/dL


 


Glucose  123 H  124 H  ()  mg/dL


 


Calcium  9.1  8.8  (8.4-10.2)  mg/dL














- Imaging and Cardiology


EKG: report reviewed (Atrial fibrillation with heart rate of 120)


Echo: report reviewed





- Telemetry


EKG Rhythm: Atrial Fibrillation





- EKG


Supraventricular dysrhythmia: atrial fibrillation





- Allied health notes


Allied health notes reviewed: nursing

## 2022-08-03 NOTE — DISCHARGE SUMMARY
Providers





- Providers


Date of Admission: 


07/25/22 16:54





Date of discharge: 08/03/22


Attending physician: 


CHARMAINE FUNEZ MD





                                        





07/25/22 15:42


Consult to Physician [CONS] Routine 


   Comment: 


   Consulting Provider: RUSS ENCISO


   Physician Instructions: 


   Reason For Exam: End-stage renal disease-newly diagnosed





07/25/22 15:55


Speech Therapy Evaluation and Treat [CONS] Stat 


   Reason For Exam: Pt lethargic with L sided paralysis d/t CVA





07/26/22 05:05


Consult to Wound/ET Nurse [CONS] Stat 


   Reason For Exam: wound eval- great toe





07/26/22 06:17


Consult to Physician [CONS] Routine 


   Comment: called office/[facility]ughes


   Consulting Provider: DEVI JAEGER


   Physician Instructions: 


   Reason For Exam: CHF





07/26/22 15:01


Consult to Physician [CONS] Routine 


   Comment: called office/jhughes


   Consulting Provider: YAMILE HUNT


   Physician Instructions: 


   Reason For Exam: hx CVA 2021, ? anticougulation re afib





07/27/22 12:37


Occupational Therapy Evaluate and Treat [CONS] Routine 


   Comment: 


   Reason For Exam: weakness, debility


Physical Therapy Evaluation and Treat [CONS] Routine 


   Comment: 


   Reason For Exam: weakness, debility





07/28/22 12:07


Consult to Physician [CONS] Routine 


   Comment: called office/ [place]


   Consulting Provider: ERIC BOJORQUEZ


   Physician Instructions: 


   Reason For Exam: ?dry gangrene











Primary care physician: 


PRIMARY CARE MD








Hospitalization


Reason for admission: CARLEY, hyperkalemia


Condition: Stable


Hospital course: 





Patient is a 42-year-old male with history of hypertension, CVA medication 

noncompliance who presented with hypoglycemia and weakness.  He was found to 

have a significant CARLEY and hyperkalemia.  He was admitted for hypertensive 

emergency and transferred to ICU.  Nephrology was consulted and dialysis was 

initiated.  Echocardiogram showed left ventricular ejection fraction of 20 to 

25% with a severely dilated left atrium.  Due to abnormal pulses and right first

 toe gangrene, vascular surgery was consulted.  Vascular intervention was 

delayed due to awaiting renal recovery.  Once his creatinine improve the Vas-

Cath was removed.  Patient's CARLEY resolved and he opted to not continue vascular 

intervention.  Once stable, he was discharged home with updated medications and 

instructions to follow-up with his cardiologist and vascular surgeon.


Disposition: 06 HOME HEALTH CARE SERVICE


Final Discharge Diagnosis (Prints w/discharge instructions): Acute kidney injury

 secondary to vasomotor nephropathy.  Peripheral artery disease.  Dry gangrene 

of right great toe.  Atrial fibrillation.  History of MI status post stenting.  

Nonischemic cardiomyopathy, systolic heart failure.  Leukocytosis.  History of 

CVA with residual left-sided hemiplegia.  COPD.  Mild protein calorie 

malnutrition.  Morbid obesity.  Tobacco dependence


Time spent for discharge: 40 minutes





Core Measure Documentation





- Palliative Care


Palliative Care/ Comfort Measures: Not Applicable





- Core Measures


Any of the following diagnoses?: none





Exam





- Physical Exam


Narrative exam: 





GENERAL: Well-developed well-nourished. In no acute distress.


HEENT: Normocephalic. Atraumatic. 


NECK: Supple.  


CHEST/LUNGS: CTAB on room air


HEART/CARDIOVASCULAR: RRR. No murmur, rubs or gallops appreciated.


ABDOMEN: +BS. NT/ND.


SKIN: No rashes noted.


NEURO:  No focal motor deficit.  Follows all commands.


MUSCULOSKELETAL: No joint effusion 


EXTREMITIES: No cyanosis, clubbing or edema.


PSYCH: Cooperative.





- Constitutional


Vitals: 


                                        











Temp Pulse Resp BP Pulse Ox


 


 98.0 F   70   18   130/99   98 


 


 08/03/22 07:37  08/03/22 11:16  08/03/22 07:37  08/03/22 07:37  08/03/22 07:37














Plan


Care Plan Goals: 


Please schedule appointment with the vascular surgeon in 2 weeks.


Please follow-up with your cardiologist in the next 1 to 2 weeks. If you wish, 

you may also follow-up with [facility] Heart specialists.  Call phone 

#[phone] to schedule an appointment.


Please take all medications as they are prescribed to you.


Follow up with: 


PRIMARY CARE,MD [Primary Care Provider] - 3-5 Days


ERIC BOJORQUEZ MD [Staff Physician] - 14 Days


Prescriptions: 


Amiodarone [Cordarone 200 MG TAB] 200 mg PO BID 30 Days #60 tablet


Apixaban [Eliquis] 5 mg PO Q12HR 30 Days #60 tablet


Metoprolol [Lopressor TAB] 75 mg PO Q8H 30 Days #270 tablet

## 2022-08-03 NOTE — PROGRESS NOTE
Assessment and Plan





- Patient Problems


(1) CARLEY (acute kidney injury)


Current Visit: Yes   Status: Acute   


Plan to address problem: 


Patient has not required any further dialysis at this time and renal function 

continues to improve. Renal function now at baseline. 


  He has excellent urine output at this time.  Vascath has been removed.








(2) Acute on chronic HFrEF (heart failure with reduced ejection fraction)


Current Visit: Yes   Status: Acute   


Plan to address problem: 


Cardiology recommendations reviewed.  Diuretics as recommended.  Counseled on 

importance of fluid and sodium restrictions.  Currently patient seems euvolemic 

on examination.  Overall respiratory status is stable.  We will continue to 

monitor closely.








(3) Hyperkalemia


Current Visit: Yes   Status: Acute   


Plan to address problem: 


Potassium levels have improved with dialysis.  We will continue to monitor 

closely.  Counseled patient on the importance of a low potassium diet in 

general.








(4) Hyponatremia


Current Visit: Yes   Status: Acute   


Plan to address problem: 


Likely in the setting of volume overload.  Improvement since admission.








(5) Diabetes mellitus


Current Visit: Yes   Status: Chronic   


Plan to address problem: 


Diabetes management per primary attending.








Subjective


Date of service: 08/03/22


Principal diagnosis: CARLEY, Acute on chronic HFrEF, OPAD, NICMP, Permanent AF with

RVR


Interval history: 





No acute changes. Renal function stable. 





Objective





- Vital Signs


Vital signs: 


                               Vital Signs - 12hr











  08/02/22 08/02/22 08/03/22





  21:59 23:03 03:16


 


Temperature  97.7 F 97.6 F


 


Pulse Rate  71 92 H


 


Respiratory  14 16





Rate   


 


Blood Pressure  129/91 147/96


 


O2 Sat by Pulse 98 98 94





Oximetry   














  08/03/22 08/03/22





  03:24 07:37


 


Temperature  98.0 F


 


Pulse Rate 92 H 70


 


Respiratory  18





Rate  


 


Blood Pressure 147/96 130/99


 


O2 Sat by Pulse  98





Oximetry  














- General Appearance


General appearance: well-developed


EENT: ATNC


Neck: no JVD


Respiratory: Present: Clear to Ascultation


Cardiology: regular


Gastrointestinal: normal


Integumentary: no rash


Neurologic: no focal deficit


Musculoskeletal: deferred


Psychiatric: cooperative





- Lab





                                 08/02/22 11:33





                                 08/03/22 05:21


                             Most recent lab results











Calcium  8.8 mg/dL (8.4-10.2)   08/03/22  05:21    


 


Phosphorus  3.60 mg/dL (2.5-4.5)   07/29/22  05:50    


 


Magnesium  1.80 mg/dL (1.7-2.3)   07/29/22  05:50    


 


Urine Creatinine  62.6 mg/dL (0.1-20.0)  H  07/28/22  07:10    


 


Urine Sodium  26 mmol/L  07/28/22  07:10    


 


Urine Total Protein  15 mg/dL (5-11.8)  H  07/28/22  07:10    














- Imaging


Chest x-ray: pending





- Allied health notes


Allied health notes reviewed: nursing





Medications & Allergies





- Medications


Allergies/Adverse Reactions: 


                                    Allergies





adhesive Allergy (Verified 07/25/22 14:07)


   Rash


ibuprofen [From Motrin] Allergy (Verified 07/25/22 14:07)


   Swelling


lisinopril Allergy (Verified 07/25/22 14:07)


   COUGH


Iodinated Contrast Media [Iodinated Contrast Media - IV Dye] Adverse Reaction 

(Verified 07/25/22 14:07)


   KIDNEYS SHUT DOWN








Home Medications: 


                                Home Medications











 Medication  Instructions  Recorded  Confirmed  Last Taken  Type


 


Digoxin [Lanoxin] 0.25 mg PO DAILY@1700 #30 tablet 01/05/15 08/01/22 07/25/22 

10:00 Rx


 


Aspirin 325 mg PO QDAY 06/27/17 08/01/22 07/25/22 10:00 History





    325 


 


Furosemide [Lasix TAB] 40 mg PO QDAY 06/27/17 08/01/22 07/25/22 History


 


Losartan [Cozaar] 50 mg PO QDAY 06/27/17 08/01/22 07/25/22 10:00 History


 


HYDROcodone/APAP 5-325 [[place] 1 each PO Q6HR PRN #20 tablet 02/27/20 08/01/22 07/25/22 10:00 Rx





5/325]    1 


 


Penicillin Vk [Veetids TAB] 500 mg PO QID #21 tablet 02/27/20 08/01/22 07/25/22 

10:00 Rx


 


Acetaminophen [Acetaminophen TAB] 1,000 mg PO Q6HR #30 tablet 05/27/20 08/01/22 07/25/22 10:00 Rx





    1000 


 


cephALEXin [Keflex] 500 mg PO Q8HR #30 cap 05/27/20 08/01/22 07/24/22 10:00 Rx











Active Medications: 














Generic Name Dose Route Start Last Admin





  Trade Name Freq  PRN Reason Stop Dose Admin


 


Acetaminophen  650 mg  07/25/22 16:54  07/27/22 09:52





  Acetaminophen 325 Mg Tab  PO   650 mg





  Q4H PRN   Administration





  Pain MILD(1-3)/Fever >100.5/HA  


 


Amiodarone HCl  200 mg  08/01/22 22:00  08/02/22 21:20





  Amiodarone 200 Mg Tab  PO   200 mg





  BID LYNDA   Administration


 


Aspirin  325 mg  07/26/22 10:00  08/02/22 09:06





  Aspirin 325 Mg Tab  PO   325 mg





  QDAY LYNDA   Administration


 


Dextrose  50 ml  07/26/22 10:45 





  Dextrose 50% In Water (25gm) 50 Ml Syringe  IV  





  Q30MIN PRN  





  Hypoglycemia  





  Protocol  


 


Glucagon  1 mg  07/25/22 15:39  07/25/22 16:08





  Glucagon (Human Recombinant) 1 Mg/Ml Inj  IV   1 mg





  Q1H PRN   Administration





  Hypoglycemia  


 


Heparin Sodium/Sodium Chloride  25,000 unit in 500 mls @ 30 mls/hr  07/27/22 

12:00  08/03/22 07:17





  Heparin/ 0.45% Nacl-25,000 Unit/500 Ml  IV   2,550 units/hr





  TITR LYNDA   51 mls/hr





    Administration





  Protocol  





  1,500 UNITS/HR  


 


Insulin Human Regular  0 units  07/29/22 16:30  08/02/22 21:18





  Insulin Regular, Human 100 Units/1 Ml  SUB-Q   Not Given





  [facility]  





  Protocol  


 


Metoprolol Tartrate  75 mg  08/02/22 10:17  08/03/22 03:24





  Metoprolol Tartrate 25 Mg Tab  PO   75 mg





  Q8H [facility]   Administration


 


Morphine Sulfate  2 mg  07/25/22 16:54  07/27/22 13:48





  Morphine 2 Mg/1 Ml Inj  IV   2 mg





  Q4H PRN   Administration





  Pain, Moderate (4-6)  


 


Ondansetron HCl  4 mg  07/27/22 12:00  07/27/22 13:48





  Ondansetron 4 Mg/2 Ml Inj  IV   4 mg





  Q6H PRN   Administration





  Nausea And Vomiting  


 


Oxycodone/Acetaminophen  1 tab  07/25/22 16:54  08/02/22 13:29





  Oxycodone /Acetaminophen 5-325mg Tab  PO   1 tab





  Q6H PRN   Administration





  Pain, Moderate (4-6)  


 


Sodium Chloride  10 ml  07/25/22 22:00  08/02/22 21:19





  Sodium Chloride 0.9% 10 Ml Flush Syringe  IV   10 ml





  BID LYNDA   Administration


 


Sodium Chloride  10 ml  07/25/22 16:54 





  Sodium Chloride 0.9% 10 Ml Flush Syringe  IV  





  PRN PRN  





  LINE FLUSH

## 2022-08-23 ENCOUNTER — HOSPITAL ENCOUNTER (EMERGENCY)
Dept: HOSPITAL 5 - ED | Age: 42
Discharge: LEFT BEFORE DISCHARGE | End: 2022-08-23
Payer: MEDICARE

## 2022-08-23 VITALS — SYSTOLIC BLOOD PRESSURE: 117 MMHG | DIASTOLIC BLOOD PRESSURE: 90 MMHG

## 2022-08-23 DIAGNOSIS — F17.200: ICD-10-CM

## 2022-08-23 DIAGNOSIS — Z88.6: ICD-10-CM

## 2022-08-23 DIAGNOSIS — R00.0: ICD-10-CM

## 2022-08-23 DIAGNOSIS — F10.20: ICD-10-CM

## 2022-08-23 DIAGNOSIS — I10: ICD-10-CM

## 2022-08-23 DIAGNOSIS — R74.02: ICD-10-CM

## 2022-08-23 DIAGNOSIS — Z88.8: ICD-10-CM

## 2022-08-23 DIAGNOSIS — R10.9: Primary | ICD-10-CM

## 2022-08-23 DIAGNOSIS — Z91.041: ICD-10-CM

## 2022-08-23 LAB
ALBUMIN SERPL-MCNC: 4.3 G/DL (ref 3.9–5)
ALT SERPL-CCNC: 15 UNITS/L (ref 7–56)
BASOPHILS # (AUTO): 0.1 K/MM3 (ref 0–0.1)
BASOPHILS NFR BLD AUTO: 0.8 % (ref 0–1.8)
BUN SERPL-MCNC: 19 MG/DL (ref 9–20)
BUN/CREAT SERPL: 14 %
CALCIUM SERPL-MCNC: 9.7 MG/DL (ref 8.4–10.2)
EOSINOPHIL # BLD AUTO: 0.1 K/MM3 (ref 0–0.4)
EOSINOPHIL NFR BLD AUTO: 0.6 % (ref 0–4.3)
HCT VFR BLD CALC: 47.5 % (ref 35.5–45.6)
HEMOLYSIS INDEX: 3
HGB BLD-MCNC: 15.8 GM/DL (ref 11.8–15.2)
LYMPHOCYTES # BLD AUTO: 2 K/MM3 (ref 1.2–5.4)
LYMPHOCYTES NFR BLD AUTO: 13.8 % (ref 13.4–35)
MCHC RBC AUTO-ENTMCNC: 33 % (ref 32–34)
MCV RBC AUTO: 92 FL (ref 84–94)
MONOCYTES # (AUTO): 0.8 K/MM3 (ref 0–0.8)
MONOCYTES % (AUTO): 5.8 % (ref 0–7.3)
MUCOUS THREADS #/AREA URNS HPF: (no result) /HPF
PLATELET # BLD: 321 K/MM3 (ref 140–440)
RBC # BLD AUTO: 5.2 M/MM3 (ref 3.65–5.03)
RBC #/AREA URNS HPF: 40 /HPF (ref 0–6)
WBC #/AREA URNS HPF: 5 /HPF (ref 0–6)

## 2022-08-23 PROCEDURE — 80053 COMPREHEN METABOLIC PANEL: CPT

## 2022-08-23 PROCEDURE — 71045 X-RAY EXAM CHEST 1 VIEW: CPT

## 2022-08-23 PROCEDURE — 99284 EMERGENCY DEPT VISIT MOD MDM: CPT

## 2022-08-23 PROCEDURE — 96375 TX/PRO/DX INJ NEW DRUG ADDON: CPT

## 2022-08-23 PROCEDURE — 96361 HYDRATE IV INFUSION ADD-ON: CPT

## 2022-08-23 PROCEDURE — 83880 ASSAY OF NATRIURETIC PEPTIDE: CPT

## 2022-08-23 PROCEDURE — 96365 THER/PROPH/DIAG IV INF INIT: CPT

## 2022-08-23 PROCEDURE — 84484 ASSAY OF TROPONIN QUANT: CPT

## 2022-08-23 PROCEDURE — 85025 COMPLETE CBC W/AUTO DIFF WBC: CPT

## 2022-08-23 PROCEDURE — 83735 ASSAY OF MAGNESIUM: CPT

## 2022-08-23 PROCEDURE — 82140 ASSAY OF AMMONIA: CPT

## 2022-08-23 PROCEDURE — 36415 COLL VENOUS BLD VENIPUNCTURE: CPT

## 2022-08-23 PROCEDURE — 81001 URINALYSIS AUTO W/SCOPE: CPT

## 2022-08-23 NOTE — EMERGENCY DEPARTMENT REPORT
ED Shortness of Breath HPI





- General


Chief Complaint: Dyspnea/Respdistress


Stated Complaint: TROUBLE BREATHING


Time Seen by Provider: 08/23/22 07:19


Source: EMS


Mode of arrival: Stretcher


Limitations: No Limitations





- History of Present Illness


Initial Comments: 





Patient is a 42-year-old male with history of hypertension, CVA presenting to ED

with complaint of shortness of breath and flank pain beginning last night.  He 

was admitted here roughly 2 weeks ago and found to be in acute renal failure 

requiring emergent dialysis.  Echocardiogram showed left ventricular ejection 

fraction of 20 to 25%.  He was also found to have a gangrenous right toe with 

decreased pulses for which vascular surgery was consulted.  Vascular 

intervention was delayed due to his CARLEY.  Once his CARLEY resolved he declined 

vascular intervention.  He was discharged with instructions to follow-up with 

cardiology and vascular surgery.





- Related Data


                                Home Medications











 Medication  Instructions  Recorded  Confirmed  Last Taken


 


Aspirin 325 mg PO QDAY 06/27/17 08/01/22 07/25/22 10:00





    325


 


Furosemide [Lasix TAB] 40 mg PO QDAY 06/27/17 08/01/22 07/25/22








                                  Previous Rx's











 Medication  Instructions  Recorded  Last Taken  Type


 


HYDROcodone/APAP 5-325 [[place] 1 each PO Q6HR PRN #20 tablet 02/27/20 07/25/22 

10:00 Rx





5-325 mg TAB]   1 


 


Acetaminophen [Acetaminophen TAB] 1,000 mg PO Q6HR #30 tablet 05/27/20 07/25/22 

10:00 Rx





   1000 


 


Amiodarone [Cordarone 200 MG TAB] 200 mg PO BID 30 Days #60 tablet 08/03/22 Un

known Rx


 


Apixaban [Eliquis] 5 mg PO Q12HR 30 Days #60 tablet 08/03/22 Unknown Rx


 


Metoprolol [Lopressor TAB] 75 mg PO Q8H 30 Days #270 tablet 08/03/22 Unknown Rx











                                    Allergies











Allergy/AdvReac Type Severity Reaction Status Date / Time


 


adhesive Allergy  Rash Verified 07/25/22 14:07


 


ibuprofen [From Motrin] Allergy  Swelling Verified 07/25/22 14:07


 


lisinopril Allergy  COUGH Verified 07/25/22 14:07


 


Iodinated Contrast Media AdvReac  KIDNEYS Verified 07/25/22 14:07





[Iodinated Contrast Media -   SHUT DOWN  





IV Dye]     














ED Review of Systems


ROS: 


Stated complaint: TROUBLE BREATHING


Other details as noted in HPI





Constitutional: denies: chills, fever


Respiratory: shortness of breath


Cardiovascular: denies: chest pain, palpitations


Gastrointestinal: abdominal pain (Flank pain)


Genitourinary: denies: urgency, dysuria


Musculoskeletal: denies: back pain, joint swelling, arthralgia


Skin: denies: rash, lesions


Neurological: denies: headache


Psychiatric: denies: anxiety, depression





ED Past Medical Hx





- Past Medical History


Previous Medical History?: Yes


Hx Hypertension: Yes


Hx CVA: Yes


Hx Heart Attack/AMI: Yes (2016)


Hx Congestive Heart Failure: Yes


Hx Diabetes: No


Hx Renal Disease: Yes


Hx Arthritis: Yes


Hx Seizures: No


Hx Kidney Stones: Yes


Hx Asthma: Yes


Hx COPD: Yes


Additional medical history: a fib. ,  Cardiomyopathy 20% ejection fraction, 

atrial clot





- Surgical History


Past Surgical History?: Yes


Hx Coronary Stent: Yes (2017)


Additional Surgical History: r/l lithotripsy Nov 2019





- Social History


Smoking Status: Current Every Day Smoker


Substance Use Type: Alcohol





- Medications


Home Medications: 


                                Home Medications











 Medication  Instructions  Recorded  Confirmed  Last Taken  Type


 


Aspirin 325 mg PO QDAY 06/27/17 08/01/22 07/25/22 10:00 History





    325 


 


Furosemide [Lasix TAB] 40 mg PO QDAY 06/27/17 08/01/22 07/25/22 History


 


HYDROcodone/APAP 5-325 [[place] 1 each PO Q6HR PRN #20 tablet 02/27/20 08/01/22 07/25/22 10:00 Rx





5-325 mg TAB]    1 


 


Acetaminophen [Acetaminophen TAB] 1,000 mg PO Q6HR #30 tablet 05/27/20 08/01/22 07/25/22 10:00 Rx





    1000 


 


Amiodarone [Cordarone 200 MG TAB] 200 mg PO BID 30 Days #60 tablet 08/03/22  

Unknown Rx


 


Apixaban [Eliquis] 5 mg PO Q12HR 30 Days #60 tablet 08/03/22  Unknown Rx


 


Metoprolol [Lopressor TAB] 75 mg PO Q8H 30 Days #270 tablet 08/03/22  Unknown Rx














ED Physical Exam





- General


Limitations: No Limitations


General appearance: obese





- Head


Head exam: Present: atraumatic, normocephalic





- Respiratory


Respiratory exam: Present: normal lung sounds bilaterally.  Absent: respiratory 

distress





- Cardiovascular


Cardiovascular Exam: Present: normal rhythm, tachycardia, normal heart sounds





- GI/Abdominal


GI/Abdominal exam: Present: soft.  Absent: distended, tenderness





- Rectal


Rectal exam: Present: deferred





- Neurological Exam


Neurological exam: Present: alert, oriented X3, other (Left hemiplegia)





- Psychiatric


Psychiatric exam: Present: normal affect, normal mood





- Skin


Skin exam: Present: warm, dry, intact, normal color





ED Course


                                   Vital Signs











  08/23/22 08/23/22 08/23/22





  06:12 07:43 07:46


 


Temperature 98.7 F  


 


Pulse Rate 97 H  115 H


 


Respiratory 18  15





Rate   


 


Blood Pressure 157/115  125/93


 


O2 Sat by Pulse 98 99 97





Oximetry   














  08/23/22 08/23/22 08/23/22





  07:47 08:00 08:16


 


Temperature 97.5 F L  


 


Pulse Rate   


 


Respiratory   





Rate   


 


Blood Pressure  125/93 125/93


 


O2 Sat by Pulse  99 99





Oximetry   














  08/23/22 08/23/22 08/23/22





  08:30 08:46 09:00


 


Temperature   


 


Pulse Rate   


 


Respiratory   





Rate   


 


Blood Pressure 125/93 111/85 111/85


 


O2 Sat by Pulse 98 97 100





Oximetry   














  08/23/22 08/23/22 08/23/22





  09:16 09:30 09:45


 


Temperature   


 


Pulse Rate 119 H 114 H 120 H


 


Respiratory 16 15 17





Rate   


 


Blood Pressure 111/85 111/85 150/108


 


O2 Sat by Pulse 97 99 98





Oximetry   














  08/23/22 08/23/22 08/23/22





  10:00 10:16 10:30


 


Temperature   


 


Pulse Rate 134 H 129 H 103 H


 


Respiratory 20 16 17





Rate   


 


Blood Pressure 150/108 150/108 150/108


 


O2 Sat by Pulse 97 99 97





Oximetry   














  08/23/22 08/23/22 08/23/22





  10:45 11:02 11:16


 


Temperature   


 


Pulse Rate 114 H 113 H 113 H


 


Respiratory 19 18 18





Rate   


 


Blood Pressure 130/79 130/79 130/79


 


O2 Sat by Pulse 96 98 97





Oximetry   














  08/23/22 08/23/22 08/23/22





  11:30 11:46 12:00


 


Temperature   


 


Pulse Rate 111 H 120 H 114 H


 


Respiratory 13 21 19





Rate   


 


Blood Pressure 130/79 136/82 130/79


 


O2 Sat by Pulse 99 97 99





Oximetry   














  08/23/22 08/23/22 08/23/22





  12:16 12:30 12:46


 


Temperature   


 


Pulse Rate 99 H 121 H 114 H


 


Respiratory 22 18 18





Rate   


 


Blood Pressure 130/79 130/79 120/89


 


O2 Sat by Pulse 99 99 99





Oximetry   














  08/23/22





  13:00


 


Temperature 


 


Pulse Rate 126 H


 


Respiratory 21





Rate 


 


Blood Pressure 120/89


 


O2 Sat by Pulse 97





Oximetry 














ED Medical Decision Making





- Lab Data


Result diagrams: 


                                 08/23/22 08:01





                                 08/23/22 08:01





- Medical Decision Making





Patient presenting to ED with complaint of flank pain.  WBC count today is 14 

which is actually decreased compared to previous visit.  Magnesium 1.6.  Patient

given IV replacement.  Lactic acid is 2.6.  IV fluids given.  Patient was also 

given IV morphine for pain.  CT abdomen and pelvis was ordered however patient 

initially declined stating that he was still in pain and requested more pain 

medication prior to going to CT.  He was given 1 mg of Dilaudid IV.  Patient 

again declined CT stating that he is unable to lie on the table due to 

discomfort.  I explained to him the need for CT in diagnosing his condition.  

States he wishes to sign out AMA.  I discussed potential risks of signing out 

AMA including death.  Patient voiced understanding.  Still request to sign out 

AMA.


Critical care attestation.: 


If time is entered above; I have spent that time in minutes in the direct care 

of this critically ill patient, excluding procedure time.








ED Disposition


Clinical Impression: 


 Flank pain, Tachycardia, Elevated lactic acid level





Disposition: 07 LEFT AGAINST MEDICAL ADVICE


Is pt being admited?: No


Instructions:  Flank Pain, Adult


Time of Disposition: 13:15